# Patient Record
Sex: MALE | Race: BLACK OR AFRICAN AMERICAN | Employment: OTHER | URBAN - METROPOLITAN AREA
[De-identification: names, ages, dates, MRNs, and addresses within clinical notes are randomized per-mention and may not be internally consistent; named-entity substitution may affect disease eponyms.]

---

## 2017-02-02 ENCOUNTER — APPOINTMENT (OUTPATIENT)
Dept: LAB | Facility: HOSPITAL | Age: 62
End: 2017-02-02
Attending: INTERNAL MEDICINE
Payer: COMMERCIAL

## 2017-02-02 DIAGNOSIS — E11.42 CONTROLLED TYPE 2 DIABETES MELLITUS WITH DIABETIC POLYNEUROPATHY, WITHOUT LONG-TERM CURRENT USE OF INSULIN (HCC): ICD-10-CM

## 2017-02-02 DIAGNOSIS — Z00.00 ROUTINE HEALTH MAINTENANCE: ICD-10-CM

## 2017-02-02 LAB
ANION GAP SERPL CALC-SCNC: 9 MMOL/L (ref 0–18)
BUN SERPL-MCNC: 9 MG/DL (ref 8–20)
BUN/CREAT SERPL: 9 (ref 10–20)
CALCIUM SERPL-MCNC: 9.4 MG/DL (ref 8.5–10.5)
CHLORIDE SERPL-SCNC: 105 MMOL/L (ref 95–110)
CO2 SERPL-SCNC: 27 MMOL/L (ref 22–32)
CREAT SERPL-MCNC: 1 MG/DL (ref 0.5–1.5)
CREAT UR-MCNC: 217.3 MG/DL
GLUCOSE SERPL-MCNC: 85 MG/DL (ref 70–99)
MICROALBUMIN UR-MCNC: 3.1 MG/DL (ref 0–1.8)
MICROALBUMIN/CREAT UR: 14.3 MG/G{CREAT} (ref 0–20)
OSMOLALITY UR CALC.SUM OF ELEC: 290 MOSM/KG (ref 275–295)
POTASSIUM SERPL-SCNC: 4.3 MMOL/L (ref 3.3–5.1)
PSA SERPL-MCNC: 1.3 NG/ML (ref 0–4)
SODIUM SERPL-SCNC: 141 MMOL/L (ref 136–144)

## 2017-02-02 PROCEDURE — 36415 COLL VENOUS BLD VENIPUNCTURE: CPT

## 2017-02-02 PROCEDURE — 83036 HEMOGLOBIN GLYCOSYLATED A1C: CPT

## 2017-02-02 PROCEDURE — 80048 BASIC METABOLIC PNL TOTAL CA: CPT

## 2017-02-02 PROCEDURE — 82043 UR ALBUMIN QUANTITATIVE: CPT

## 2017-02-02 PROCEDURE — 82570 ASSAY OF URINE CREATININE: CPT

## 2017-02-03 LAB — HBA1C MFR BLD: 6.5 % (ref 4–6)

## 2017-02-16 ENCOUNTER — OFFICE VISIT (OUTPATIENT)
Dept: INTERNAL MEDICINE CLINIC | Facility: CLINIC | Age: 62
End: 2017-02-16

## 2017-02-16 VITALS
HEART RATE: 72 BPM | SYSTOLIC BLOOD PRESSURE: 138 MMHG | DIASTOLIC BLOOD PRESSURE: 78 MMHG | RESPIRATION RATE: 16 BRPM | HEIGHT: 66 IN | WEIGHT: 235 LBS | BODY MASS INDEX: 37.77 KG/M2

## 2017-02-16 DIAGNOSIS — R80.9 DIABETES MELLITUS WITH MICROALBUMINURIA (HCC): ICD-10-CM

## 2017-02-16 DIAGNOSIS — E11.29 DIABETES MELLITUS WITH MICROALBUMINURIA (HCC): ICD-10-CM

## 2017-02-16 DIAGNOSIS — E11.42 CONTROLLED TYPE 2 DIABETES MELLITUS WITH DIABETIC POLYNEUROPATHY, WITHOUT LONG-TERM CURRENT USE OF INSULIN (HCC): ICD-10-CM

## 2017-02-16 DIAGNOSIS — G57.93 NEUROPATHY OF BOTH FEET: Primary | ICD-10-CM

## 2017-02-16 DIAGNOSIS — F17.200 TOBACCO USE DISORDER: ICD-10-CM

## 2017-02-16 PROCEDURE — 99214 OFFICE O/P EST MOD 30 MIN: CPT | Performed by: INTERNAL MEDICINE

## 2017-02-16 PROCEDURE — 99212 OFFICE O/P EST SF 10 MIN: CPT | Performed by: INTERNAL MEDICINE

## 2017-02-16 RX ORDER — GABAPENTIN 300 MG/1
CAPSULE ORAL
Qty: 180 CAPSULE | Refills: 1 | Status: SHIPPED | OUTPATIENT
Start: 2017-02-16 | End: 2017-07-20

## 2017-02-16 RX ORDER — METFORMIN HYDROCHLORIDE 500 MG/1
2000 TABLET, EXTENDED RELEASE ORAL
Qty: 360 TABLET | Refills: 1 | Status: SHIPPED | OUTPATIENT
Start: 2017-02-16 | End: 2017-07-20

## 2017-02-16 NOTE — PATIENT INSTRUCTIONS
Do fasting labs in May. Fast for 12 hours. Water is okay. Walk in. Getting Support for Quitting Smoking  You don’t have to go through the process of quitting smoking without support. Tell people you are quitting.  The support of friends, coworkers, Sometimes you may just need to talk when you miss smoking. Ex-smokers are good to talk to, because they’re likely to know how you feel. You may need extra support in the first few weeks after you quit.  Ask a friend to call you each day to see how you’re do

## 2017-02-16 NOTE — PROGRESS NOTES
HPI:    Patient ID: Derrick Haywood is a 57year old male. HPI Comments: The gabapentin helps a little, but he still has numbness. It is bad at the end of the day when he relaxes. He can't quit smoking and is not motivated to do so.   He had his blood Accu-Chek Softclix Lancets Does not apply Misc Use once a day. Dx 250.00 Disp: 100 each Rfl: 3   Blood Glucose Monitoring Suppl (ACCU-CHEK DANDRE PLUS) W/DEVICE Does not apply Kit Use daily.   Dx 250.00 Disp: 1 kit Rfl: 1   Glucose Blood (ACCU-CHEK DANDRE)

## 2017-04-07 ENCOUNTER — OFFICE VISIT (OUTPATIENT)
Dept: INTERNAL MEDICINE CLINIC | Facility: CLINIC | Age: 62
End: 2017-04-07

## 2017-04-07 VITALS
HEIGHT: 66 IN | SYSTOLIC BLOOD PRESSURE: 148 MMHG | DIASTOLIC BLOOD PRESSURE: 84 MMHG | HEART RATE: 77 BPM | WEIGHT: 239.13 LBS | BODY MASS INDEX: 38.43 KG/M2 | RESPIRATION RATE: 18 BRPM

## 2017-04-07 DIAGNOSIS — M54.16 LEFT LUMBAR RADICULOPATHY: Primary | ICD-10-CM

## 2017-04-07 PROCEDURE — 99212 OFFICE O/P EST SF 10 MIN: CPT | Performed by: INTERNAL MEDICINE

## 2017-04-07 PROCEDURE — 99213 OFFICE O/P EST LOW 20 MIN: CPT | Performed by: INTERNAL MEDICINE

## 2017-04-07 RX ORDER — CYCLOBENZAPRINE HCL 10 MG
10 TABLET ORAL 3 TIMES DAILY PRN
Qty: 30 TABLET | Refills: 0 | Status: SHIPPED | OUTPATIENT
Start: 2017-04-07 | End: 2017-04-27

## 2017-04-07 RX ORDER — NABUMETONE 750 MG/1
750 TABLET, FILM COATED ORAL 2 TIMES DAILY WITH MEALS
Qty: 30 TABLET | Refills: 0 | Status: SHIPPED | OUTPATIENT
Start: 2017-04-07 | End: 2017-05-04

## 2017-04-07 NOTE — PROGRESS NOTES
See Sinclair is a 57year old male. Patient presents with:  Sciatica: Left side pain for a month worse today.     HPI:   For the past 1 month, he has had persistent pain beginning in his left buttock and radiating down the posterior aspect of his left le Blood (ACCU-CHEK DANDRE) In Vitro Strip Use once a day. Dx.250.00 Disp: 100 strip Rfl: 3   aspirin 81 MG Oral Chew Tab Chew 81 mg by mouth daily.  Disp:  Rfl:      No Known Allergies   Past Medical History   Diagnosis Date   • Appendicitis    • Knee injury 2 schedule. Return if not soon better. The patient indicates understanding of these issues and agrees to the plan.     Ginny Gonzales MD  4/7/2017  12:35 PM

## 2017-04-07 NOTE — PATIENT INSTRUCTIONS
Please rest your back, avoiding frequent and heavy lifting, and apply heat 2-3 times daily. Please take nabumetone instead of Aleve, 750 mg twice daily with meals. Please take cyclobenzaprine 10 mg 3 times daily as needed, watching for drowsiness.   Consi

## 2017-04-13 ENCOUNTER — TELEPHONE (OUTPATIENT)
Dept: FAMILY MEDICINE CLINIC | Facility: CLINIC | Age: 62
End: 2017-04-13

## 2017-04-13 DIAGNOSIS — M54.30 SCIATICA, UNSPECIFIED LATERALITY: Primary | ICD-10-CM

## 2017-04-13 RX ORDER — METHYLPREDNISOLONE 4 MG/1
TABLET ORAL
Qty: 1 KIT | Refills: 0 | Status: SHIPPED | OUTPATIENT
Start: 2017-04-13 | End: 2017-04-18 | Stop reason: ALTCHOICE

## 2017-04-13 NOTE — TELEPHONE ENCOUNTER
Actions Requested:  Sent to provider and on-call for advice.     Situation/Background   Problem:  Pain from top of buttocks to left ankle   Onset:  A month ago   Associated Symptoms:     History of Same:    Precipitated By:    Aggravating/Alleviating Factor

## 2017-04-13 NOTE — TELEPHONE ENCOUNTER
Pt is calling state that he is having real bad leg pain from buttock down to ankle   Pt state that the medication is not working pt is requesting to speak with a RN

## 2017-04-14 NOTE — TELEPHONE ENCOUNTER
Pt advised of LV message below. He verbalized understanding. Provided phone number to call for therapy.

## 2017-04-14 NOTE — TELEPHONE ENCOUNTER
I will send a steroid dose pack. It will take a day or so to start working and it might make him feel a little wound up. I will also send an order for physical therapy.    To schedule Physical Therapy at any of the [facility] facilities,

## 2017-04-15 ENCOUNTER — APPOINTMENT (OUTPATIENT)
Dept: GENERAL RADIOLOGY | Facility: HOSPITAL | Age: 62
End: 2017-04-15
Attending: EMERGENCY MEDICINE
Payer: COMMERCIAL

## 2017-04-15 ENCOUNTER — HOSPITAL ENCOUNTER (EMERGENCY)
Facility: HOSPITAL | Age: 62
Discharge: HOME | End: 2017-04-15
Attending: EMERGENCY MEDICINE
Payer: COMMERCIAL

## 2017-04-15 ENCOUNTER — APPOINTMENT (OUTPATIENT)
Dept: ULTRASOUND IMAGING | Facility: HOSPITAL | Age: 62
End: 2017-04-15
Attending: EMERGENCY MEDICINE
Payer: COMMERCIAL

## 2017-04-15 VITALS
OXYGEN SATURATION: 95 % | TEMPERATURE: 97 F | HEART RATE: 78 BPM | SYSTOLIC BLOOD PRESSURE: 132 MMHG | DIASTOLIC BLOOD PRESSURE: 72 MMHG | RESPIRATION RATE: 20 BRPM

## 2017-04-15 DIAGNOSIS — M54.16 LUMBAR RADICULOPATHY: Primary | ICD-10-CM

## 2017-04-15 PROCEDURE — 93971 EXTREMITY STUDY: CPT

## 2017-04-15 PROCEDURE — 96374 THER/PROPH/DIAG INJ IV PUSH: CPT

## 2017-04-15 PROCEDURE — 99284 EMERGENCY DEPT VISIT MOD MDM: CPT

## 2017-04-15 PROCEDURE — 96375 TX/PRO/DX INJ NEW DRUG ADDON: CPT

## 2017-04-15 PROCEDURE — 72100 X-RAY EXAM L-S SPINE 2/3 VWS: CPT

## 2017-04-15 RX ORDER — DIAZEPAM 5 MG/ML
2.5 INJECTION, SOLUTION INTRAMUSCULAR; INTRAVENOUS ONCE
Status: COMPLETED | OUTPATIENT
Start: 2017-04-15 | End: 2017-04-15

## 2017-04-15 RX ORDER — HYDROCODONE BITARTRATE AND ACETAMINOPHEN 7.5; 325 MG/1; MG/1
1 TABLET ORAL EVERY 4 HOURS PRN
Qty: 20 TABLET | Refills: 0 | Status: SHIPPED | OUTPATIENT
Start: 2017-04-15 | End: 2017-04-18

## 2017-04-15 RX ORDER — HYDROMORPHONE HYDROCHLORIDE 1 MG/ML
0.5 INJECTION, SOLUTION INTRAMUSCULAR; INTRAVENOUS; SUBCUTANEOUS ONCE
Status: COMPLETED | OUTPATIENT
Start: 2017-04-15 | End: 2017-04-15

## 2017-04-15 RX ORDER — DIAZEPAM 2 MG/1
2 TABLET ORAL 3 TIMES DAILY PRN
Qty: 20 TABLET | Refills: 0 | Status: SHIPPED | OUTPATIENT
Start: 2017-04-15 | End: 2017-04-22

## 2017-04-16 NOTE — ED PROVIDER NOTES
Patient Seen in: [facility] Emergency Department    History   Patient presents with:  Back Pain (musculoskeletal)    Stated Complaint: left leg pain     HPI    Patient presents the emergency department today complaining of left low back pain radia gabapentin 300 MG Oral Cap,  1 twice daily:  4 pm and bedtime   MetFORMIN HCl  MG Oral Tablet 24 Hr,  Take 4 tablets (2,000 mg total) by mouth daily with breakfast.   Pravastatin Sodium 10 MG Oral Tab,  Take 1 tablet (10 mg total) by mouth nightly. Device 04/15/17 1910 None (Room air)       Current:/72 mmHg  Pulse 78  Temp(Src) 97.3 °F (36.3 °C) (Temporal)  Resp 20  SpO2 95%        Physical Exam   Constitutional: He is oriented to person, place, and time.  He appears well-developed and well-nour at 10:45 PM.  States the pain is much improved. We will discharged on Norco and Valium and have continue prednisone at this time.     Disposition and Plan     Clinical Impression:  Lumbar radiculopathy  (primary encounter diagnosis)    Disposition:  Bjorn Zaman

## 2017-04-16 NOTE — ED NOTES
Pt states he has been having lower left buttock pain that shoots down the left leg for last month. Pt states pain is 9/10. Saw Dr. Babetta Alpers for issue who gave him anti inflammatories and a muscle relaxer (unknown names). Pt was on steroid pack, has 3 left.  Cesar Pinto

## 2017-04-17 ENCOUNTER — TELEPHONE (OUTPATIENT)
Dept: INTERNAL MEDICINE CLINIC | Facility: CLINIC | Age: 62
End: 2017-04-17

## 2017-04-17 ENCOUNTER — TELEPHONE (OUTPATIENT)
Dept: NEUROLOGY | Facility: CLINIC | Age: 62
End: 2017-04-17

## 2017-04-17 NOTE — TELEPHONE ENCOUNTER
Pt notified of LV message below. He will come to [facility] tomorrow at 4 - added to schedule.

## 2017-04-17 NOTE — TELEPHONE ENCOUNTER
Pt called was pt of Dr Aura Jiang that was in ER 4/15/17 with back pain. This pt was placed on steroid pack and given order for physical therapy. Was given valium and Norco for back pain and told to see Dr Mark Antonio.  Pt Given appointment for 05/04/17 4pm.

## 2017-04-17 NOTE — TELEPHONE ENCOUNTER
Patient called in wanting to schedule a ER follow up appointment with Dr. Aura Jiang.  Dr. Aura Jiang has no availability for sometime soon and patient isn't seeing Dr. Al Capone until May, but Dr. Mark Antonio requested that patient follow up with patient before hand

## 2017-04-17 NOTE — TELEPHONE ENCOUNTER
Dr Angelika Carbajal, please advise. Pt has a sciatic problem. He went to ER Saturday night. He was at work and his leg hurt so bad he had to go home. Pt had x-ray and US to rule out DVT. Pt needs f/u with LV as soon as possible.   Pt wants to know if LV can add

## 2017-04-18 ENCOUNTER — OFFICE VISIT (OUTPATIENT)
Dept: INTERNAL MEDICINE CLINIC | Facility: CLINIC | Age: 62
End: 2017-04-18

## 2017-04-18 VITALS
HEIGHT: 66 IN | HEART RATE: 80 BPM | BODY MASS INDEX: 38.2 KG/M2 | WEIGHT: 237.69 LBS | SYSTOLIC BLOOD PRESSURE: 164 MMHG | DIASTOLIC BLOOD PRESSURE: 84 MMHG | RESPIRATION RATE: 18 BRPM

## 2017-04-18 DIAGNOSIS — M54.16 LEFT LUMBAR RADICULOPATHY: Primary | ICD-10-CM

## 2017-04-18 DIAGNOSIS — R80.9 DIABETES MELLITUS WITH MICROALBUMINURIA (HCC): ICD-10-CM

## 2017-04-18 DIAGNOSIS — E11.29 DIABETES MELLITUS WITH MICROALBUMINURIA (HCC): ICD-10-CM

## 2017-04-18 DIAGNOSIS — I10 ESSENTIAL HYPERTENSION: ICD-10-CM

## 2017-04-18 PROCEDURE — 99212 OFFICE O/P EST SF 10 MIN: CPT | Performed by: INTERNAL MEDICINE

## 2017-04-18 PROCEDURE — 99214 OFFICE O/P EST MOD 30 MIN: CPT | Performed by: INTERNAL MEDICINE

## 2017-04-18 RX ORDER — LOSARTAN POTASSIUM 50 MG/1
100 TABLET ORAL DAILY
Qty: 90 TABLET | Refills: 0 | OUTPATIENT
Start: 2017-04-18 | End: 2017-05-18

## 2017-04-18 RX ORDER — PREDNISONE 10 MG/1
TABLET ORAL
Qty: 23 TABLET | Refills: 0 | Status: SHIPPED | OUTPATIENT
Start: 2017-04-18 | End: 2017-05-04

## 2017-04-18 RX ORDER — HYDROCODONE BITARTRATE AND ACETAMINOPHEN 7.5; 325 MG/1; MG/1
1 TABLET ORAL EVERY 4 HOURS PRN
Qty: 30 TABLET | Refills: 0 | Status: SHIPPED | OUTPATIENT
Start: 2017-04-18 | End: 2017-04-25

## 2017-04-18 NOTE — PROGRESS NOTES
HPI:    Patient ID: Graciela Angulo is a 57year old male. HPI Comments: Severe back pain for the past 2 weeks. He went to work, but he couldn't walk after 5 hours. He has had this in the past, but it was never this bad.   I gave him steroids over the and bedtime Disp: 180 capsule Rfl: 1   MetFORMIN HCl  MG Oral Tablet 24 Hr Take 4 tablets (2,000 mg total) by mouth daily with breakfast. Disp: 360 tablet Rfl: 1   Pravastatin Sodium 10 MG Oral Tab Take 1 tablet (10 mg total) by mouth nightly.  Disp: 1.5 tabs daily for 3 days, then 1 tab daily for 3 days. Dispense: 23 tablet; Refill: 0  - hydrocodone-acetaminophen 7.5-325 MG Oral Tab; Take 1 tablet by mouth every 4 (four) hours as needed. Dispense: 30 tablet;  Refill: 0  - PT external  - MRI SPINE LUM

## 2017-04-24 ENCOUNTER — HOSPITAL ENCOUNTER (OUTPATIENT)
Dept: MRI IMAGING | Facility: HOSPITAL | Age: 62
Discharge: HOME | End: 2017-04-24
Attending: INTERNAL MEDICINE
Payer: COMMERCIAL

## 2017-04-24 ENCOUNTER — TELEPHONE (OUTPATIENT)
Dept: INTERNAL MEDICINE CLINIC | Facility: CLINIC | Age: 62
End: 2017-04-24

## 2017-04-24 DIAGNOSIS — M54.16 LEFT LUMBAR RADICULOPATHY: ICD-10-CM

## 2017-04-24 PROCEDURE — 72148 MRI LUMBAR SPINE W/O DYE: CPT

## 2017-04-24 NOTE — TELEPHONE ENCOUNTER
Pt stop by the [address] [place] [address] office, would like [facility] paperwork to be completed, please advise- thank you

## 2017-05-04 ENCOUNTER — OFFICE VISIT (OUTPATIENT)
Dept: NEUROLOGY | Facility: CLINIC | Age: 62
End: 2017-05-04

## 2017-05-04 VITALS
DIASTOLIC BLOOD PRESSURE: 78 MMHG | SYSTOLIC BLOOD PRESSURE: 144 MMHG | HEART RATE: 89 BPM | BODY MASS INDEX: 38.09 KG/M2 | RESPIRATION RATE: 19 BRPM | WEIGHT: 237 LBS | HEIGHT: 66 IN | OXYGEN SATURATION: 97 %

## 2017-05-04 DIAGNOSIS — M51.9 LUMBAR DISC DISEASE: ICD-10-CM

## 2017-05-04 DIAGNOSIS — M48.061 LUMBAR FORAMINAL STENOSIS: ICD-10-CM

## 2017-05-04 DIAGNOSIS — M54.16 LEFT LUMBAR RADICULOPATHY: Primary | ICD-10-CM

## 2017-05-04 DIAGNOSIS — M43.16 SPONDYLOLISTHESIS, LUMBAR REGION: ICD-10-CM

## 2017-05-04 DIAGNOSIS — M43.10 RETROLISTHESIS: ICD-10-CM

## 2017-05-04 PROCEDURE — 99244 OFF/OP CNSLTJ NEW/EST MOD 40: CPT | Performed by: PHYSICAL MEDICINE & REHABILITATION

## 2017-05-04 RX ORDER — HYDROCODONE BITARTRATE AND ACETAMINOPHEN 7.5; 325 MG/1; MG/1
TABLET ORAL NIGHTLY PRN
COMMUNITY
Start: 2017-04-19 | End: 2017-07-20

## 2017-05-04 NOTE — PROGRESS NOTES
HPI:   Rufino Montgomery is a 57year old male. Patient presents with:  Low Back Pain: new right handed patient here with lower back pain radiating down the left leg with numbness and tingling in the left lower extremity worsening in the last month.  pt de posterior lower leg, left arch of the foot, left top of the foot, left sole of the foot and left first dorsal web space. · There is not weakness in both legs.      Aleve on rising then by end of day Hyrocodone 7.5/325 takes 1-2 per day and states Dr Tyree Bennett COLONOSCOPY  2003    APPENDECTOMY  2012    KNEE SURGERY         Social History   Marital Status:   Spouse Name: N/A    Years of Education: N/A  Number of Children: N/A     Occupational History  None on file     Social History Main Topics   Smoking s nightly. Disp: 90 tablet Rfl: 1   omeprazole 20 MG Oral Capsule Delayed Release Take 1 capsule (20 mg total) by mouth every morning.  Disp: 90 capsule Rfl: 1       Allergies:   No Known Allergies    Physical Exam:   /78 mmHg  Pulse 89  Resp 19  Ht 66\ extremities except:  Left Achilles tendon which are absent     Hip: Hips are stable.     RIGHT hip ROM normal   LEFT hip ROM mildly limited   Right hip flexion Negative pain   LEFT hip flexion Negative pain   RIGHT hip GEORGIE test Negative for pain   LEFT hi sooner if needed. 7. He might need lumbar flexion and extension x-rays in the future depending on how he does with the above. Discussed plan with Yves Mccormick and understands the plan, patient has no further questions.      Lilia Cole, JESSICA-BC  5/4/2017

## 2017-05-04 NOTE — PATIENT INSTRUCTIONS
As of October 6th 2014, the Drug Enforcement Agency [facility]) is reclassifying all hydrocodone combination medications from Schedule III to Schedule II. This includes medications such as Norco, Vicodin, Lortab, Zohydro, and Vicoprofen.     What this means for y 1. Reviewed both lumbar xray and lumbar MRI with pt today  2. PT ordered  Pt wishing to go to [facility] referral generated  3. Discussed options and pt wishes to proceed with L5 TFESI will wait for insurance approval  4. Pt does not need pain mediation at this

## 2017-05-05 ENCOUNTER — TELEPHONE (OUTPATIENT)
Dept: NEUROLOGY | Facility: CLINIC | Age: 62
End: 2017-05-05

## 2017-05-05 NOTE — TELEPHONE ENCOUNTER
[address] [phone] for Authorization of Approval for injection procedure Lt (L5-S1) CPT Code 13257 / 34466 / 36260 Fluoroscopy t/t Mini Abbasi , stated No Auth needed with Ref #[id number], will inform nursing to call and schedule

## 2017-05-05 NOTE — TELEPHONE ENCOUNTER
Patient has been scheduled for a left L5 TFESI  on 05/16/17 at the [facility]. Medications and allergies reviewed. Patient informed to hold aspirins, nsaids, blood thinners, vitamins and fish oils 5-7 days prior to procedure.  Patient informed we will need verbal

## 2017-05-08 ENCOUNTER — TELEPHONE (OUTPATIENT)
Dept: NEUROLOGY | Facility: CLINIC | Age: 62
End: 2017-05-08

## 2017-05-08 PROBLEM — M43.16 SPONDYLOLISTHESIS, LUMBAR REGION: Status: ACTIVE | Noted: 2017-05-08

## 2017-05-08 PROBLEM — M43.10 RETROLISTHESIS: Status: ACTIVE | Noted: 2017-05-08

## 2017-05-08 PROBLEM — M48.061 LUMBAR FORAMINAL STENOSIS: Status: ACTIVE | Noted: 2017-05-08

## 2017-05-08 PROBLEM — M51.9 LUMBAR DISC DISEASE: Status: ACTIVE | Noted: 2017-05-08

## 2017-05-08 NOTE — TELEPHONE ENCOUNTER
Called patient to advise insurance was verified and PT is a covered benefit and does not require authorization for initial evaluation, patient stated that he was going to call [facility], and thanked me for my call

## 2017-05-16 ENCOUNTER — OFFICE VISIT (OUTPATIENT)
Dept: SURGERY | Facility: CLINIC | Age: 62
End: 2017-05-16

## 2017-05-16 DIAGNOSIS — M51.9 LUMBAR DISC DISEASE: ICD-10-CM

## 2017-05-16 DIAGNOSIS — M43.10 RETROLISTHESIS: ICD-10-CM

## 2017-05-16 DIAGNOSIS — M54.16 LEFT LUMBAR RADICULOPATHY: Primary | ICD-10-CM

## 2017-05-16 PROCEDURE — 64483 NJX AA&/STRD TFRM EPI L/S 1: CPT | Performed by: PHYSICAL MEDICINE & REHABILITATION

## 2017-05-16 NOTE — PROCEDURES
Stanislaw Burger UDiane [address].    LUMBAR TRANSFORAMINAL   NAME:  Frieda Sheppard    MR #:    [id number] :  1955     PHYSICIAN:  Kassi Irwin        Operative Report    DATE OF PROCEDURE: 2017   PREOPERATIVE DIAGNOSES: 1.  Left L5-S1 radic indicating correct needle placement. Then, aspiration was performed. No blood, fluid, or air was aspirated. Then, the patient was injected with a 5 cc solution of 2 cc of 40 mg/cc of Kenalog and 3 cc of 1% PF lidocaine without epinephrine.   After this,

## 2017-05-17 ENCOUNTER — TELEPHONE (OUTPATIENT)
Dept: NEUROLOGY | Facility: CLINIC | Age: 62
End: 2017-05-17

## 2017-05-17 NOTE — TELEPHONE ENCOUNTER
Patient states he is experiencing some mild tingling when he goes from sitting to standing positions in the left lower extremity. Symptoms only last a few seconds but he wanted to notify Dr. Mark Antonio.  Patient will call back in 2 weeks with condition update po

## 2017-05-17 NOTE — TELEPHONE ENCOUNTER
Amrita Chacon had an injection yesterday, 5-16-17, and was told to call with any issues. He is still having a shock/zinging sensations from his ankle to his toes.  Please call to discuss

## 2017-05-18 ENCOUNTER — OFFICE VISIT (OUTPATIENT)
Dept: INTERNAL MEDICINE CLINIC | Facility: CLINIC | Age: 62
End: 2017-05-18

## 2017-05-18 VITALS
SYSTOLIC BLOOD PRESSURE: 151 MMHG | HEART RATE: 73 BPM | HEIGHT: 66 IN | BODY MASS INDEX: 38.11 KG/M2 | RESPIRATION RATE: 18 BRPM | DIASTOLIC BLOOD PRESSURE: 78 MMHG | WEIGHT: 237.13 LBS

## 2017-05-18 DIAGNOSIS — I10 ESSENTIAL HYPERTENSION: Primary | ICD-10-CM

## 2017-05-18 DIAGNOSIS — E11.42 CONTROLLED TYPE 2 DIABETES MELLITUS WITH DIABETIC POLYNEUROPATHY, WITHOUT LONG-TERM CURRENT USE OF INSULIN (HCC): ICD-10-CM

## 2017-05-18 DIAGNOSIS — M54.16 LEFT LUMBAR RADICULOPATHY: ICD-10-CM

## 2017-05-18 PROCEDURE — 99212 OFFICE O/P EST SF 10 MIN: CPT | Performed by: INTERNAL MEDICINE

## 2017-05-18 PROCEDURE — 99214 OFFICE O/P EST MOD 30 MIN: CPT | Performed by: INTERNAL MEDICINE

## 2017-05-18 RX ORDER — AMLODIPINE BESYLATE 5 MG/1
5 TABLET ORAL DAILY
Qty: 30 TABLET | Refills: 5 | Status: SHIPPED | OUTPATIENT
Start: 2017-05-18 | End: 2017-07-20

## 2017-05-18 RX ORDER — LOSARTAN POTASSIUM 100 MG/1
100 TABLET ORAL DAILY
Qty: 90 TABLET | Refills: 0 | Status: SHIPPED | OUTPATIENT
Start: 2017-05-18 | End: 2017-07-20

## 2017-05-18 NOTE — PROGRESS NOTES
HPI:    Patient ID: Graciela Angulo is a 57year old male. HPI Comments: He is much better!!.  He still has a little tingling in his feet, but otherwise much better. HTN  This is a chronic problem. The current episode started more than 1 year ago.  Rhonda Padron respiratory distress. Musculoskeletal:        Lumbar back: He exhibits no tenderness. ASSESSMENT/PLAN:   1. Essential hypertension  It has been higher lately. Will add Norvasc. F/U in 2 months. - Losartan Potassium 100 MG Oral Tab;  Take 1

## 2017-05-25 NOTE — TELEPHONE ENCOUNTER
Recalled pt. Pt stated that he will need note to return to work. Pt has been off of work since 04/16/17. Pt stated that he has 75% resolution of symptoms, less pain but still has tighness in back. Pt has three more physical therapy visits and is doing HEP.

## 2017-06-09 ENCOUNTER — MED REC SCAN ONLY (OUTPATIENT)
Dept: NEUROLOGY | Facility: CLINIC | Age: 62
End: 2017-06-09

## 2017-06-11 DIAGNOSIS — G57.93 NEUROPATHY OF BOTH FEET: Primary | ICD-10-CM

## 2017-06-14 RX ORDER — PRAVASTATIN SODIUM 10 MG
TABLET ORAL
Qty: 90 TABLET | Refills: 0 | Status: SHIPPED | OUTPATIENT
Start: 2017-06-14 | End: 2017-07-20

## 2017-06-15 NOTE — TELEPHONE ENCOUNTER
Chart reviewed. Refills sent per Triage Dept protocol. Needs labs at next appt.   Cholesterol Medications  Protocol Criteria:  · Appointment scheduled in the past 12 months or in the next 3 months  · ALT & LDL on file in the past 12 months  · ALT result <

## 2017-07-14 ENCOUNTER — PATIENT MESSAGE (OUTPATIENT)
Dept: INTERNAL MEDICINE CLINIC | Facility: CLINIC | Age: 62
End: 2017-07-14

## 2017-07-14 DIAGNOSIS — K21.9 GASTROESOPHAGEAL REFLUX DISEASE, ESOPHAGITIS PRESENCE NOT SPECIFIED: ICD-10-CM

## 2017-07-17 RX ORDER — OMEPRAZOLE 20 MG/1
20 CAPSULE, DELAYED RELEASE ORAL EVERY MORNING
Qty: 90 CAPSULE | Refills: 0 | Status: SHIPPED | OUTPATIENT
Start: 2017-07-17 | End: 2017-11-15

## 2017-07-17 NOTE — TELEPHONE ENCOUNTER
From: Mitchel Maya  To: Andrea Balderas MD  Sent: 7/14/2017 3:03 PM CDT  Subject: Visit Follow-up Question    can't remenber if my blood work is fasting or non before my next visit . please advise.  plus [facility] and said they have requested a refill f

## 2017-07-17 NOTE — TELEPHONE ENCOUNTER
Omeprazole refilled per protocol. Email sent to patient.     Refill Protocol Appointment Criteria  · Appointment scheduled in the past 12 months or in the next 3 months  Recent Outpatient Visits            2 months ago Essential hypertension    Melissa Bess

## 2017-07-19 ENCOUNTER — TELEPHONE (OUTPATIENT)
Dept: INTERNAL MEDICINE CLINIC | Facility: CLINIC | Age: 62
End: 2017-07-19

## 2017-07-19 NOTE — TELEPHONE ENCOUNTER
Actions Requested: scheduled appt 7/20/17 in [facility] as 1st avaialble  Problem: back pain  Onset and Timing: < 24 hours 7/18/17  Associated Symptoms: while at worked lifted heavy object and pain shot up back.   Pain at lower back 6/10   Aggravating by: movement Advice Discussed:  * Reassurance  * Cold or Heat  * Sleep  * Activity  * Reasons To Call Back      - Numbness or weakness occur      - Bowel/bladder problems occur      - Pain lasts for more than 2 weeks      - You become worse  * Prevention  * Good Body M

## 2017-07-20 ENCOUNTER — OFFICE VISIT (OUTPATIENT)
Dept: INTERNAL MEDICINE CLINIC | Facility: CLINIC | Age: 62
End: 2017-07-20

## 2017-07-20 ENCOUNTER — LAB ENCOUNTER (OUTPATIENT)
Dept: LAB | Facility: HOSPITAL | Age: 62
End: 2017-07-20
Attending: INTERNAL MEDICINE
Payer: COMMERCIAL

## 2017-07-20 VITALS
HEIGHT: 66 IN | SYSTOLIC BLOOD PRESSURE: 143 MMHG | RESPIRATION RATE: 18 BRPM | WEIGHT: 241.13 LBS | BODY MASS INDEX: 38.75 KG/M2 | DIASTOLIC BLOOD PRESSURE: 82 MMHG | HEART RATE: 78 BPM

## 2017-07-20 DIAGNOSIS — E11.42 CONTROLLED TYPE 2 DIABETES MELLITUS WITH DIABETIC POLYNEUROPATHY, WITHOUT LONG-TERM CURRENT USE OF INSULIN (HCC): ICD-10-CM

## 2017-07-20 DIAGNOSIS — M54.31 SCIATICA OF RIGHT SIDE: Primary | ICD-10-CM

## 2017-07-20 DIAGNOSIS — I10 ESSENTIAL HYPERTENSION: ICD-10-CM

## 2017-07-20 DIAGNOSIS — G57.93 NEUROPATHY OF BOTH FEET: ICD-10-CM

## 2017-07-20 DIAGNOSIS — E78.00 HYPERCHOLESTEROLEMIA: ICD-10-CM

## 2017-07-20 LAB
CHOLEST SERPL-MCNC: 144 MG/DL (ref 110–200)
HBA1C MFR BLD: 7 % (ref 4–6)
HDLC SERPL-MCNC: 42 MG/DL
LDLC SERPL CALC-MCNC: 89 MG/DL (ref 0–99)
NONHDLC SERPL-MCNC: 102 MG/DL
TRIGL SERPL-MCNC: 63 MG/DL (ref 1–149)

## 2017-07-20 PROCEDURE — 80061 LIPID PANEL: CPT | Performed by: INTERNAL MEDICINE

## 2017-07-20 PROCEDURE — 36415 COLL VENOUS BLD VENIPUNCTURE: CPT

## 2017-07-20 PROCEDURE — 99214 OFFICE O/P EST MOD 30 MIN: CPT | Performed by: INTERNAL MEDICINE

## 2017-07-20 PROCEDURE — 83036 HEMOGLOBIN GLYCOSYLATED A1C: CPT

## 2017-07-20 PROCEDURE — 99212 OFFICE O/P EST SF 10 MIN: CPT | Performed by: INTERNAL MEDICINE

## 2017-07-20 RX ORDER — AMLODIPINE BESYLATE 5 MG/1
5 TABLET ORAL DAILY
Qty: 90 TABLET | Refills: 1 | Status: SHIPPED | OUTPATIENT
Start: 2017-07-20 | End: 2017-08-03

## 2017-07-20 RX ORDER — HYDROCODONE BITARTRATE AND ACETAMINOPHEN 7.5; 325 MG/1; MG/1
1 TABLET ORAL 2 TIMES DAILY PRN
Qty: 40 TABLET | Refills: 0 | Status: SHIPPED | OUTPATIENT
Start: 2017-07-20 | End: 2018-01-11 | Stop reason: ALTCHOICE

## 2017-07-20 RX ORDER — LOSARTAN POTASSIUM 100 MG/1
100 TABLET ORAL DAILY
Qty: 90 TABLET | Refills: 0 | Status: CANCELLED | OUTPATIENT
Start: 2017-07-20 | End: 2018-07-20

## 2017-07-20 RX ORDER — METFORMIN HYDROCHLORIDE 500 MG/1
2000 TABLET, EXTENDED RELEASE ORAL
Qty: 360 TABLET | Refills: 1 | Status: SHIPPED | OUTPATIENT
Start: 2017-07-20 | End: 2018-02-22

## 2017-07-20 RX ORDER — LOSARTAN POTASSIUM 100 MG/1
100 TABLET ORAL DAILY
Qty: 180 TABLET | Refills: 1 | Status: SHIPPED | OUTPATIENT
Start: 2017-07-20 | End: 2018-02-22

## 2017-07-20 RX ORDER — GABAPENTIN 300 MG/1
300 CAPSULE ORAL 3 TIMES DAILY PRN
Qty: 270 CAPSULE | Refills: 1 | Status: SHIPPED | OUTPATIENT
Start: 2017-07-20 | End: 2018-01-26

## 2017-07-20 RX ORDER — PRAVASTATIN SODIUM 10 MG
TABLET ORAL
Qty: 90 TABLET | Refills: 1 | Status: SHIPPED | OUTPATIENT
Start: 2017-07-20 | End: 2018-02-22

## 2017-07-20 NOTE — PROGRESS NOTES
HPI:    Patient ID: Radha Mcneill is a 57year old male. He hurt his back on 7/18 at work while lifting something. He works at a kitchen at [employer].  There was sudden onset pain in the low back radiating down the the right knee.   He ha Cap 1 twice daily:  4 pm and bedtime (Patient taking differently: Take 300 mg by mouth 2 (two) times daily.  ) Disp: 180 capsule Rfl: 1   MetFORMIN HCl  MG Oral Tablet 24 Hr Take 4 tablets (2,000 mg total) by mouth daily with breakfast. Disp: 360 tab present management.  - gabapentin 300 MG Oral Cap; Take 1 capsule (300 mg total) by mouth 3 (three) times daily as needed. Dispense: 270 capsule; Refill: 1    5. Hypercholesterolemia  Stable. Continue present management.   - Pravastatin Sodium 10 MG Oral

## 2017-08-03 ENCOUNTER — OFFICE VISIT (OUTPATIENT)
Dept: INTERNAL MEDICINE CLINIC | Facility: CLINIC | Age: 62
End: 2017-08-03

## 2017-08-03 VITALS
HEART RATE: 83 BPM | RESPIRATION RATE: 18 BRPM | HEIGHT: 66 IN | BODY MASS INDEX: 39.19 KG/M2 | DIASTOLIC BLOOD PRESSURE: 82 MMHG | SYSTOLIC BLOOD PRESSURE: 149 MMHG | WEIGHT: 243.88 LBS

## 2017-08-03 DIAGNOSIS — E11.42 CONTROLLED TYPE 2 DIABETES MELLITUS WITH DIABETIC POLYNEUROPATHY, WITHOUT LONG-TERM CURRENT USE OF INSULIN (HCC): ICD-10-CM

## 2017-08-03 DIAGNOSIS — M54.31 SCIATICA OF RIGHT SIDE: Primary | ICD-10-CM

## 2017-08-03 DIAGNOSIS — I10 ESSENTIAL HYPERTENSION: ICD-10-CM

## 2017-08-03 PROCEDURE — 99214 OFFICE O/P EST MOD 30 MIN: CPT | Performed by: INTERNAL MEDICINE

## 2017-08-03 PROCEDURE — 99212 OFFICE O/P EST SF 10 MIN: CPT | Performed by: INTERNAL MEDICINE

## 2017-08-03 RX ORDER — AMLODIPINE BESYLATE 10 MG/1
10 TABLET ORAL DAILY
Qty: 30 TABLET | Refills: 0 | OUTPATIENT
Start: 2017-08-03 | End: 2018-02-22

## 2017-08-03 NOTE — PROGRESS NOTES
HPI:    Patient ID: Rehana Negrete is a 60year old male. The back pain has improved, but is still so bad that he can only drive for a few miles. He has burning across the back. He is not sure if the gabapentin is helping.   He is going to PT which is Known Allergies   PHYSICAL EXAM:   Physical Exam   Nursing note and vitals reviewed. Constitutional: He is oriented to person, place, and time. He appears well-developed and well-nourished. HENT:   Head: Normocephalic and atraumatic.    Eyes: EOM are no

## 2017-08-16 ENCOUNTER — MED REC SCAN ONLY (OUTPATIENT)
Dept: INTERNAL MEDICINE CLINIC | Facility: CLINIC | Age: 62
End: 2017-08-16

## 2017-09-05 ENCOUNTER — OFFICE VISIT (OUTPATIENT)
Dept: NEUROLOGY | Facility: CLINIC | Age: 62
End: 2017-09-05

## 2017-09-05 VITALS
DIASTOLIC BLOOD PRESSURE: 88 MMHG | RESPIRATION RATE: 18 BRPM | SYSTOLIC BLOOD PRESSURE: 144 MMHG | HEART RATE: 79 BPM | BODY MASS INDEX: 38.09 KG/M2 | WEIGHT: 237 LBS | OXYGEN SATURATION: 95 % | HEIGHT: 66 IN

## 2017-09-05 DIAGNOSIS — M51.9 LUMBAR DISC DISEASE: Primary | ICD-10-CM

## 2017-09-05 DIAGNOSIS — M54.16 LEFT LUMBAR RADICULOPATHY: ICD-10-CM

## 2017-09-05 DIAGNOSIS — M48.061 LUMBAR FORAMINAL STENOSIS: ICD-10-CM

## 2017-09-05 DIAGNOSIS — E11.9 CONTROLLED TYPE 2 DIABETES MELLITUS WITHOUT COMPLICATION, WITHOUT LONG-TERM CURRENT USE OF INSULIN (HCC): ICD-10-CM

## 2017-09-05 DIAGNOSIS — M43.16 SPONDYLOLISTHESIS, LUMBAR REGION: ICD-10-CM

## 2017-09-05 DIAGNOSIS — M43.10 RETROLISTHESIS: ICD-10-CM

## 2017-09-05 PROCEDURE — 99214 OFFICE O/P EST MOD 30 MIN: CPT | Performed by: PHYSICAL MEDICINE & REHABILITATION

## 2017-09-05 NOTE — PATIENT INSTRUCTIONS
Plan  I will perform bilateral L5 TFESI(s). He will continue with his light duty for now lifting no more than 10 lbs with rare bending and twisting.     The patient will continue with his home exercise program.    The patient does not need any pain med

## 2017-09-05 NOTE — PROGRESS NOTES
Low Back Pain H & P    Chief Complaint: Patient presents with:  Back Pain: Left L5 TFESI 5/16/17 with 75% relief lasting for 2 months. Pt then lifted shelving at work causing recurrent pain in back. Pain is upper and lower back.  Pt has mild pinching sensat myopia 6/30/2015   • Knee injury 2012   • Obesity, unspecified    • Other and unspecified hyperlipidemia    • Type II or unspecified type diabetes mellitus without mention of complication, not stated as uncontrolled    • Unspecified essential hypertension Extraocular muscles are intact. There is no kern icterus. Pupils are equal, round, and reactive to light. No redness or discharge bilaterally. Skin:  There are no rashes or lesions.     Vitals:   09/05/17  1536   BP: 144/88   Pulse: 79   Resp: 18       G his home exercise program.    The patient does not need any pain medications at this time. The patient will follow up in 3 months, but the patient will call me 2 weeks after having the injection to let me know how the injection worked.     The patient un

## 2017-09-06 ENCOUNTER — TELEPHONE (OUTPATIENT)
Dept: NEUROLOGY | Facility: CLINIC | Age: 62
End: 2017-09-06

## 2017-09-07 NOTE — TELEPHONE ENCOUNTER
Ifrah on [email or web address] cary requesting  authorization of approval of bilateral L5 TFESIs cpt codes [id number], [id number], will fax clinical notes.

## 2017-09-07 NOTE — TELEPHONE ENCOUNTER
Faxed clinical notes to [email or web address] for authorization of bilateral L5 TFESIs pending approval.

## 2017-09-12 ENCOUNTER — MED REC SCAN ONLY (OUTPATIENT)
Dept: INTERNAL MEDICINE CLINIC | Facility: CLINIC | Age: 62
End: 2017-09-12

## 2017-09-19 NOTE — TELEPHONE ENCOUNTER
PATRICIA/m on [email or web address] [email or web address]/[email or web address] checking on status of authorization of bilateral L5 TFESIs pending approval.

## 2017-09-25 NOTE — TELEPHONE ENCOUNTER
L/m on [facility] v/m checking on status of approval of bilateral L5 TFESIs. Await return call and or  fax. Heydi Harris

## 2017-10-06 NOTE — TELEPHONE ENCOUNTER
Patient is in a lot of pain. He has left messages with Jovany Abbott and no one is returning his calls. We haven't had any response from Mali Arriaga either. He has [facility] and wants to know if he can go through his employer.

## 2017-10-09 NOTE — TELEPHONE ENCOUNTER
Received fax from [employer] requesting clinical notes, will fax.  Faxed clinical notes to Vivian Suresh for authorization pending approval.

## 2017-10-18 NOTE — TELEPHONE ENCOUNTER
L/m on  [email or web address] [email or web address] checking on statis of approval for bilateral L5 TFESIs. Await return call or fax.

## 2017-11-09 NOTE — TELEPHONE ENCOUNTER
T/t Greg Cardona checking on status of approval for bilateral L5 TFESIs. States he is still waiting on records from [facility] and another Dr. so he can determine if procedure will be approved.  Per his request, will call him on 11/16/17 to leighann

## 2017-11-15 DIAGNOSIS — K21.9 GASTROESOPHAGEAL REFLUX DISEASE, ESOPHAGITIS PRESENCE NOT SPECIFIED: ICD-10-CM

## 2017-11-15 RX ORDER — OMEPRAZOLE 20 MG/1
20 CAPSULE, DELAYED RELEASE ORAL EVERY MORNING
Qty: 90 CAPSULE | Refills: 1 | Status: SHIPPED | OUTPATIENT
Start: 2017-11-15 | End: 2018-02-22

## 2017-11-15 NOTE — TELEPHONE ENCOUNTER
Pharmacy calling to request refill for omeprazole 20 MG Oral Capsule Delayed Release. Current Outpatient Prescriptions:  omeprazole 20 MG Oral Capsule Delayed Release Take 1 capsule (20 mg total) by mouth every morning.  Disp: 90 capsule Rfl: 0

## 2017-12-14 NOTE — TELEPHONE ENCOUNTER
L/m on [email or web address] W/c checking on status of approval for bilateral L5 TFESIs. Await response. Oneal Claude

## 2017-12-20 ENCOUNTER — HOSPITAL ENCOUNTER (EMERGENCY)
Facility: HOSPITAL | Age: 62
Discharge: HOME | End: 2017-12-20
Attending: EMERGENCY MEDICINE
Payer: COMMERCIAL

## 2017-12-20 ENCOUNTER — NURSE TRIAGE (OUTPATIENT)
Dept: OTHER | Age: 62
End: 2017-12-20

## 2017-12-20 ENCOUNTER — OFFICE VISIT (OUTPATIENT)
Dept: OPHTHALMOLOGY | Facility: CLINIC | Age: 62
End: 2017-12-20

## 2017-12-20 VITALS
TEMPERATURE: 98 F | RESPIRATION RATE: 18 BRPM | BODY MASS INDEX: 38.77 KG/M2 | OXYGEN SATURATION: 98 % | SYSTOLIC BLOOD PRESSURE: 161 MMHG | HEART RATE: 92 BPM | HEIGHT: 67 IN | WEIGHT: 247 LBS | DIASTOLIC BLOOD PRESSURE: 77 MMHG

## 2017-12-20 DIAGNOSIS — H57.11 OCULAR PAIN, RIGHT EYE: Primary | ICD-10-CM

## 2017-12-20 DIAGNOSIS — H20.00 ACUTE IRITIS OF RIGHT EYE: Primary | ICD-10-CM

## 2017-12-20 PROCEDURE — 99212 OFFICE O/P EST SF 10 MIN: CPT | Performed by: OPHTHALMOLOGY

## 2017-12-20 PROCEDURE — 99283 EMERGENCY DEPT VISIT LOW MDM: CPT

## 2017-12-20 PROCEDURE — 99214 OFFICE O/P EST MOD 30 MIN: CPT | Performed by: OPHTHALMOLOGY

## 2017-12-20 RX ORDER — TETRACAINE HYDROCHLORIDE 5 MG/ML
1-2 SOLUTION OPHTHALMIC ONCE
Status: COMPLETED | OUTPATIENT
Start: 2017-12-20 | End: 2017-12-20

## 2017-12-20 RX ORDER — PURIFIED WATER 986 MG/ML
SOLUTION OPHTHALMIC
Status: COMPLETED
Start: 2017-12-20 | End: 2017-12-20

## 2017-12-20 RX ORDER — TETRACAINE HYDROCHLORIDE 5 MG/ML
SOLUTION OPHTHALMIC
Status: COMPLETED
Start: 2017-12-20 | End: 2017-12-20

## 2017-12-20 RX ORDER — PURIFIED WATER 986 MG/ML
2 SOLUTION OPHTHALMIC AS NEEDED
Status: DISCONTINUED | OUTPATIENT
Start: 2017-12-20 | End: 2017-12-20

## 2017-12-20 RX ORDER — PREDNISOLONE ACETATE 10 MG/ML
SUSPENSION/ DROPS OPHTHALMIC
Qty: 1 BOTTLE | Refills: 1 | Status: SHIPPED | OUTPATIENT
Start: 2017-12-20 | End: 2017-12-26

## 2017-12-20 NOTE — ED NOTES
Care assumed from triage presents from home with 3 day hx nontraumatic R eye pain with associated conjunctiva induration denies drainage Visual acuity completed

## 2017-12-20 NOTE — ED PROVIDER NOTES
Patient Seen in: [facility] Emergency Department    History   Patient presents with:   Eye Visual Problem (opthalmic)    Stated Complaint: Right eye redness/sore    HPI    Patient is a 76-year-old male that complains of redness to his right eye for (36.8 °C) (Oral)   Resp 18   Ht 170.2 cm (5' 7\")   Wt 112 kg   SpO2 98%   BMI 38.69 kg/m²     Right Eye Chart Acuity: 20/50, Corrected  Left Eye Chart Acuity: 20/40, Corrected    Physical Exam    Gen: pt is alert, no obvious distress  Eyelids: nl inspecti

## 2017-12-20 NOTE — TELEPHONE ENCOUNTER
Action Requested: Summary for Provider     []  Critical Lab, Recommendations Needed  [] Need Additional Advice  [x]   FYI    []   Need Orders  [] Need Medications Sent to Pharmacy  []  Other     SUMMARY: Advised ER per protocol as pt states eye pain is s

## 2017-12-20 NOTE — ED NOTES
Discharged to follow up immediately with optho at scheduled appointment  Alert and interactive. Hemodynamically stable.  Ambulates to exit with steady gait

## 2017-12-20 NOTE — ASSESSMENT & PLAN NOTE
Discussed diagnosis and treatment in detail with patient. Information on iritis given to patient. 5% Homatropine and 2.5% Roberto instilled in right eye in office at time of appointment.    Start Pred forte in the right eye;   Use 1 drop in the right eye onc

## 2017-12-20 NOTE — TELEPHONE ENCOUNTER
ED  Current      12/20/2017 - present  [facility] AND [facility] Emergency Department   Mercy Chapman MD   Attending • Treatment team   Ocular pain, right eye   Clinical impression   Eye Visual Problem (opthalmic)   Chief complaint

## 2017-12-20 NOTE — TELEPHONE ENCOUNTER
Per LV conversation, ER is sending patient to Dr. Sami Altamirano. The ER can handle pink eye and allergies, but do not have the equipment or expertise to handle eye emergencies.

## 2017-12-20 NOTE — TELEPHONE ENCOUNTER
Please see if you can get him in with one of our ophthalmologists. If not Dr. Jason Romero, then perhaps Dr. Tracie Acosta or Dr. Darius Forbes. If not them, then the [facility] eye clinic. The ER is not a very good place for this problem.

## 2017-12-20 NOTE — PATIENT INSTRUCTIONS
Acute iritis of right eye  Discussed diagnosis and treatment in detail with patient. Information on iritis given to patient. 5% Homatropine and 2.5% Roberto instilled in right eye in office at time of appointment.    Start Pred forte in the right eye;   Use healthcare provider right away if any of these occur:  · Symptoms don’t start to improve after 1 week  · Iritis from eye injury causes symptoms for more than 2 weeks  · Pain increases  · Your eye becomes red  · You lose some or all of your vision  Date Las edema)  · Calcium deposits on the cornea (band keratopathy)  · Optic nerve damage  If severe, problems like these can cause partial or total loss of eyesight. Your eye doctor will try to prevent these problems by treating your iritis right away.  You may ne front of your eye. In the center of the iris is the pupil. It opens and closes to control how much light enters your eye. The middle part of the eye is called the uvea. It includes the iris. With iritis, the iris and anterior chamber are inflamed.  [facility], [address]. All rights reserved. This information is not intended as a substitute for professional medical care. Always follow your healthcare professional's instructions.

## 2017-12-20 NOTE — PROGRESS NOTES
Marcela Coma is a 60year old male. HPI:     HPI     Patient is complaining  of pain (pain is a 5/10) and redness to his right eye for the last 4 days. He states his vision this morning seems cloudy and blurry.   There is no discharge or drainage or rarely      Medications:    Current Outpatient Prescriptions:  prednisoLONE acetate (PRED FORTE) 1 % Ophthalmic Suspension Use 1 drop every hour while awake in the right eye for 2 days, then decrease to 1 drop every 2 hours while awake until next visit.  Curry Francis pigment on ant capsule Deep and quiet- + pigment on ant capsule    Iris fibrin along pupil margin superiorly  Normal            Refraction     Wearing Rx       Sphere Cylinder Axis Add    Right -9.25 +1.50 170 +2.25    Left -12.00 +1.50 030 +2.25    Type:

## 2017-12-21 NOTE — TELEPHONE ENCOUNTER
Patient returning call, states he feels much better today. Pt diagnosed with acute iritis of right eye by Dr Robyn Mckinney. Advised pt if symptoms do not improve or if they worsen, to contact Dr Pippa Guerrier office directly.   Patient verbalized understanding a

## 2017-12-26 ENCOUNTER — OFFICE VISIT (OUTPATIENT)
Dept: OPHTHALMOLOGY | Facility: CLINIC | Age: 62
End: 2017-12-26

## 2017-12-26 DIAGNOSIS — H20.00 ACUTE IRITIS OF RIGHT EYE: Primary | ICD-10-CM

## 2017-12-26 PROCEDURE — 99212 OFFICE O/P EST SF 10 MIN: CPT | Performed by: OPHTHALMOLOGY

## 2017-12-26 PROCEDURE — 99213 OFFICE O/P EST LOW 20 MIN: CPT | Performed by: OPHTHALMOLOGY

## 2017-12-26 RX ORDER — PREDNISOLONE ACETATE 10 MG/ML
SUSPENSION/ DROPS OPHTHALMIC
Qty: 1 BOTTLE | Refills: 1 | Status: SHIPPED | OUTPATIENT
Start: 2017-12-26 | End: 2018-02-07

## 2017-12-26 NOTE — PROGRESS NOTES
Cherelle Bradford is a 60year old male. HPI:     HPI     Patient is here for a 1 week recheck of iritis OD. After last visit on 12/20/17, patient used PF every hour while awake for 2 days, and currently is using it every 2 hours in the right eye.   The p Outpatient Prescriptions:  prednisoLONE acetate (PRED FORTE) 1 % Ophthalmic Suspension Use 1 drop 4 times a day in the right eye for 14 days, then 3 times a day for 7 days, then 2 times a day for 7 days, then once a day for 7 days, then discontinue.  Disp: Deep and quiet- + pigment on ant capsule    Iris Normal Normal    Lens + pigment on anterior capsule              Refraction     Wearing Rx       Sphere Cylinder Axis Add    Right -9.25 +1.50 170 +2.25    Left -12.00 +1.50 030 +2.25    Type:  Progressive b

## 2017-12-26 NOTE — ASSESSMENT & PLAN NOTE
Improved. Continue Pred forte 4 times a day in the right  for 2 more weeks, then 3 times a day for 1 week, then 2 times a day for 1 week, then once a day for 1 week, then discontinue. Discussed with patient that iritis can reoccur.   Told patient to [facility]

## 2017-12-26 NOTE — PATIENT INSTRUCTIONS
Acute iritis of right eye  Improved. Continue Pred forte 4 times a day in the right  for 2 more weeks, then 3 times a day for 1 week, then 2 times a day for 1 week, then once a day for 1 week, then discontinue.    Discussed with patient that iritis can reo

## 2018-01-08 NOTE — TELEPHONE ENCOUNTER
From: Marlon Loya  Sent: 1/8/2018 8:08 AM CST  Subject: Medication Renewal Request    Marlon Loya would like a refill of the following medications:     Glucose Blood (ONETOUCH TEST) In Vitro Strip Neda Locke MD]     [facility] LANCETS Does not

## 2018-01-11 ENCOUNTER — APPOINTMENT (OUTPATIENT)
Dept: LAB | Age: 63
End: 2018-01-11
Attending: PHYSICIAN ASSISTANT
Payer: COMMERCIAL

## 2018-01-11 ENCOUNTER — OFFICE VISIT (OUTPATIENT)
Dept: INTERNAL MEDICINE CLINIC | Facility: CLINIC | Age: 63
End: 2018-01-11

## 2018-01-11 VITALS
SYSTOLIC BLOOD PRESSURE: 144 MMHG | HEART RATE: 83 BPM | DIASTOLIC BLOOD PRESSURE: 80 MMHG | HEIGHT: 66 IN | TEMPERATURE: 97 F | RESPIRATION RATE: 20 BRPM | WEIGHT: 245.88 LBS | OXYGEN SATURATION: 96 % | BODY MASS INDEX: 39.52 KG/M2

## 2018-01-11 DIAGNOSIS — R07.89 CHEST WALL DISCOMFORT: Primary | ICD-10-CM

## 2018-01-11 DIAGNOSIS — R07.89 CHEST WALL DISCOMFORT: ICD-10-CM

## 2018-01-11 LAB
ALBUMIN SERPL BCP-MCNC: 4.4 G/DL (ref 3.5–4.8)
ALBUMIN/GLOB SERPL: 1.4 {RATIO} (ref 1–2)
ALP SERPL-CCNC: 67 U/L (ref 32–100)
ALT SERPL-CCNC: 29 U/L (ref 17–63)
ANION GAP SERPL CALC-SCNC: 8 MMOL/L (ref 0–18)
AST SERPL-CCNC: 23 U/L (ref 15–41)
BASOPHILS # BLD: 0.1 K/UL (ref 0–0.2)
BASOPHILS NFR BLD: 1 %
BILIRUB SERPL-MCNC: 0.3 MG/DL (ref 0.3–1.2)
BUN SERPL-MCNC: 8 MG/DL (ref 8–20)
BUN/CREAT SERPL: 7.8 (ref 10–20)
CALCIUM SERPL-MCNC: 9.6 MG/DL (ref 8.5–10.5)
CHLORIDE SERPL-SCNC: 106 MMOL/L (ref 95–110)
CO2 SERPL-SCNC: 27 MMOL/L (ref 22–32)
CREAT SERPL-MCNC: 1.02 MG/DL (ref 0.5–1.5)
EOSINOPHIL # BLD: 0.2 K/UL (ref 0–0.7)
EOSINOPHIL NFR BLD: 2 %
ERYTHROCYTE [DISTWIDTH] IN BLOOD BY AUTOMATED COUNT: 14.3 % (ref 11–15)
ERYTHROCYTE [SEDIMENTATION RATE] IN BLOOD: 9 MM/HR (ref 0–20)
GLOBULIN PLAS-MCNC: 3.2 G/DL (ref 2.5–3.7)
GLUCOSE SERPL-MCNC: 170 MG/DL (ref 70–99)
HCT VFR BLD AUTO: 42.6 % (ref 41–52)
HGB BLD-MCNC: 14.1 G/DL (ref 13.5–17.5)
LIPASE SERPL-CCNC: 27 U/L (ref 22–51)
LYMPHOCYTES # BLD: 2.6 K/UL (ref 1–4)
LYMPHOCYTES NFR BLD: 29 %
MCH RBC QN AUTO: 30.5 PG (ref 27–32)
MCHC RBC AUTO-ENTMCNC: 33.1 G/DL (ref 32–37)
MCV RBC AUTO: 92 FL (ref 80–100)
MONOCYTES # BLD: 0.7 K/UL (ref 0–1)
MONOCYTES NFR BLD: 8 %
NEUTROPHILS # BLD AUTO: 5.4 K/UL (ref 1.8–7.7)
NEUTROPHILS NFR BLD: 60 %
OSMOLALITY UR CALC.SUM OF ELEC: 294 MOSM/KG (ref 275–295)
PLATELET # BLD AUTO: 258 K/UL (ref 140–400)
PMV BLD AUTO: 9.4 FL (ref 7.4–10.3)
POTASSIUM SERPL-SCNC: 4.2 MMOL/L (ref 3.3–5.1)
PROT SERPL-MCNC: 7.6 G/DL (ref 5.9–8.4)
RBC # BLD AUTO: 4.63 M/UL (ref 4.5–5.9)
SODIUM SERPL-SCNC: 141 MMOL/L (ref 136–144)
TSH SERPL-ACNC: 1.28 UIU/ML (ref 0.45–5.33)
WBC # BLD AUTO: 8.9 K/UL (ref 4–11)

## 2018-01-11 PROCEDURE — 36415 COLL VENOUS BLD VENIPUNCTURE: CPT | Performed by: PHYSICIAN ASSISTANT

## 2018-01-11 PROCEDURE — 85652 RBC SED RATE AUTOMATED: CPT | Performed by: PHYSICIAN ASSISTANT

## 2018-01-11 PROCEDURE — 93005 ELECTROCARDIOGRAM TRACING: CPT

## 2018-01-11 PROCEDURE — 85025 COMPLETE CBC W/AUTO DIFF WBC: CPT | Performed by: PHYSICIAN ASSISTANT

## 2018-01-11 PROCEDURE — 99213 OFFICE O/P EST LOW 20 MIN: CPT | Performed by: PHYSICIAN ASSISTANT

## 2018-01-11 PROCEDURE — 93010 ELECTROCARDIOGRAM REPORT: CPT | Performed by: PHYSICIAN ASSISTANT

## 2018-01-11 PROCEDURE — 83690 ASSAY OF LIPASE: CPT | Performed by: PHYSICIAN ASSISTANT

## 2018-01-11 PROCEDURE — 80053 COMPREHEN METABOLIC PANEL: CPT | Performed by: PHYSICIAN ASSISTANT

## 2018-01-11 PROCEDURE — 84443 ASSAY THYROID STIM HORMONE: CPT | Performed by: PHYSICIAN ASSISTANT

## 2018-01-11 RX ORDER — AMLODIPINE BESYLATE 5 MG/1
5 TABLET ORAL
Refills: 1 | COMMUNITY
Start: 2017-11-28 | End: 2018-01-11 | Stop reason: DRUGHIGH

## 2018-01-11 NOTE — PROGRESS NOTES
HPI:    Patient ID: Radha Mcneill is a 60year old male. Patient presents today for physical exam however has some complaints of chest pain so will change to an acute visit. Reports having left upper chest discomfort that began over the weekend.   Sym for numbness (burning sensation of left axilla radiating to left upper chest). Negative for weakness, light-headedness and headaches. Psychiatric/Behavioral: Negative for sleep disturbance. The patient is not nervous/anxious.                Current Outpat supple. Cardiovascular: Normal rate, regular rhythm, normal heart sounds and intact distal pulses. Edema not present. Pulmonary/Chest: Effort normal and breath sounds normal. He has no wheezes. He has no rales. Abdominal: Soft.  Bowel sounds are no instructions to avoid or limit certain foods and beverages in the diet that can exacerbate acid reflux and heartburn symptoms. Avoid all NSAIDs for now. Patient to contact the office if symptoms worsen or if new symptoms develop.       Orders Placed This

## 2018-01-11 NOTE — PATIENT INSTRUCTIONS
Tylenol 1-2 tablets every 4-6 hours   Stretch the left shoulder/arm a few times per day  Apply ice and/or heat to the affected area 3 times daily    Limit/avoid:  Caffeine   Soda  Smoking  Spicy/fatty foods  Chocolate  Peppermint  Anti-inflammatories (ibup

## 2018-01-13 NOTE — TELEPHONE ENCOUNTER
Refill Protocol Appointment Criteria  · Appointment scheduled in the past 12 months or in the next 3 months  Recent Outpatient Visits            2 weeks ago Acute iritis of right eye    [facility] Kristine Maravilla MD

## 2018-01-26 DIAGNOSIS — G57.93 NEUROPATHY OF BOTH FEET: ICD-10-CM

## 2018-01-26 RX ORDER — GABAPENTIN 300 MG/1
300 CAPSULE ORAL 3 TIMES DAILY PRN
Qty: 90 CAPSULE | Refills: 0 | Status: SHIPPED | OUTPATIENT
Start: 2018-01-26 | End: 2018-02-22

## 2018-01-26 NOTE — TELEPHONE ENCOUNTER
Pharmacist requesting Gabapentin refill for pt.     Last filled 7/20/17  Last OV 1/11/18    Please advise

## 2018-01-29 NOTE — TELEPHONE ENCOUNTER
L/m on [facility] 's @[employer] v/m requesting status of approval/denial for bilateral L5 TFESIs. Informed we have been waiting for response since 09/17. Await return call.

## 2018-02-07 ENCOUNTER — OFFICE VISIT (OUTPATIENT)
Dept: OPHTHALMOLOGY | Facility: CLINIC | Age: 63
End: 2018-02-07

## 2018-02-07 DIAGNOSIS — H43.393 FLOATER, VITREOUS, BILATERAL: ICD-10-CM

## 2018-02-07 DIAGNOSIS — H25.13 AGE-RELATED NUCLEAR CATARACT OF BOTH EYES: ICD-10-CM

## 2018-02-07 DIAGNOSIS — H20.00 ACUTE IRITIS OF RIGHT EYE: Primary | ICD-10-CM

## 2018-02-07 DIAGNOSIS — H53.002 AMBLYOPIA, LEFT: ICD-10-CM

## 2018-02-07 DIAGNOSIS — E11.9 DIABETES MELLITUS TYPE 2 WITHOUT RETINOPATHY (HCC): ICD-10-CM

## 2018-02-07 DIAGNOSIS — H52.13 HIGH MYOPIA, BILATERAL: ICD-10-CM

## 2018-02-07 PROCEDURE — 92014 COMPRE OPH EXAM EST PT 1/>: CPT | Performed by: OPHTHALMOLOGY

## 2018-02-07 RX ORDER — PREDNISOLONE ACETATE 10 MG/ML
SUSPENSION/ DROPS OPHTHALMIC
Qty: 1 BOTTLE | Refills: 0 | Status: SHIPPED | OUTPATIENT
Start: 2018-02-07 | End: 2018-02-21

## 2018-02-07 NOTE — PATIENT INSTRUCTIONS
Acute iritis of right eye  Patient has some slight inflammation in the right eye. Resume Pred forte twice a day in the right eye for 7 days and then once a day in the right eye for 7 days, then discontinue. Discussed with patient that iritis can reoccur. reduce pain from inflammation. Iritis from eye injury usually resolves in 1 to 2 weeks. Iritis from other causes may take several weeks to months to resolve. Home care  · Use eye drops as prescribed.   · Wear sunglasses to decrease light sensitivity and di eye  · Steroid medicines to treat inflammation  · Eye drops to dilate your eye. This may prevent some complications.   · In rare cases immunosuppressive medicines like cyclosporine or etanercept  You may have medicines as eye drops, by mouth, through an IV away if you have any of these:  · Eye pain  · Loss of some eyesight  · Symptoms that get worse, or don’t get better with treatment  · New symptoms   Date Last Reviewed: 8/10/2015  © 5876-1348 [employer] [address].  [address] [place], [place] 1 more than once. You may also have symptoms that are more severe. Diagnosing iritis  Your eye care doctor (ophthalmologist) will ask about your past health and give you an eye exam. He or she may look into your eye with a slit lamp microscope.  This is a de likely you’ll notice floaters. Floaters can also be caused by an eye injury or surgery. People who are very nearsighted may get more floaters. If floaters appear suddenly or greatly increase in number, see your healthcare provider.  This may be a sign of an

## 2018-02-07 NOTE — PROGRESS NOTES
Graciela Angulo is a 60year old male.     HPI:     HPI     Diabetic Eye Exam    Additional comments: Pt has been a diabetic for 5 years  5 years on pills/  0 years on Insulin   Pt checks his BS 4x a day   Pt's last blood sugar was  144  Last HA1C was 7.0 o tried to quit several time, and            unsuccessful. Alcohol use:  No                Medications:    Current Outpatient Prescriptions:  prednisoLONE acetate (PRED FORTE) 1 % Ophthalmic Suspension Use 1 drop 2 times a day in the right eye for 7 days, th Right Left    Lids/Lashes Dermatochalasis, Meibomian gland dysfunction Dermatochalasis, Meibomian gland dysfunction    Conjunctiva/Sclera Non-injected Non-injected    Cornea no KP Clear    Anterior Chamber deep with one cell, + pigment on ant capsule early cataracts with patient. No treatment recommended at this time. Floater, vitreous, bilateral   There is no evidence of retinal pathology. All signs and symptoms of retinal detachment/tears explained in detail.     Patient instructed to call t

## 2018-02-07 NOTE — ASSESSMENT & PLAN NOTE
Patient has some slight inflammation in the right eye. Resume Pred forte twice a day in the right eye for 7 days and then once a day in the right eye for 7 days, then discontinue. Discussed with patient that iritis can reoccur.   Told patient to watch for

## 2018-02-22 DIAGNOSIS — E11.42 CONTROLLED TYPE 2 DIABETES MELLITUS WITH DIABETIC POLYNEUROPATHY, WITHOUT LONG-TERM CURRENT USE OF INSULIN (HCC): ICD-10-CM

## 2018-02-22 DIAGNOSIS — I10 ESSENTIAL HYPERTENSION: ICD-10-CM

## 2018-02-22 DIAGNOSIS — K21.9 GASTROESOPHAGEAL REFLUX DISEASE, ESOPHAGITIS PRESENCE NOT SPECIFIED: ICD-10-CM

## 2018-02-22 DIAGNOSIS — G57.93 NEUROPATHY OF BOTH FEET: ICD-10-CM

## 2018-02-22 DIAGNOSIS — E78.00 HYPERCHOLESTEROLEMIA: ICD-10-CM

## 2018-02-22 NOTE — TELEPHONE ENCOUNTER
Hypertensive Medications  Protocol Criteria:  · Appointment scheduled in the past 6 months or in the next 3 months  · BMP or CMP in the past 12 months  · Creatinine result < 2  Recent Outpatient Visits            2 weeks ago Acute iritis of right eye    El

## 2018-02-22 NOTE — TELEPHONE ENCOUNTER
Pt is new to Mail order. Pt stts he was informed mail order can not send request to office, can only receive.      Mail order  #  [phone]    Pt asking to send refills to mail order for the following medications:      gabapentin 300 MG Oral Cap Take 1

## 2018-02-26 RX ORDER — OMEPRAZOLE 20 MG/1
20 CAPSULE, DELAYED RELEASE ORAL EVERY MORNING
Qty: 90 CAPSULE | Refills: 0 | Status: SHIPPED | OUTPATIENT
Start: 2018-02-26 | End: 2018-05-14

## 2018-02-26 RX ORDER — METFORMIN HYDROCHLORIDE 500 MG/1
2000 TABLET, EXTENDED RELEASE ORAL
Qty: 360 TABLET | Refills: 1 | Status: SHIPPED | OUTPATIENT
Start: 2018-02-26 | End: 2018-09-28

## 2018-02-26 RX ORDER — AMLODIPINE BESYLATE 10 MG/1
10 TABLET ORAL DAILY
Qty: 90 TABLET | Refills: 0 | Status: SHIPPED | OUTPATIENT
Start: 2018-02-26 | End: 2018-05-14

## 2018-02-26 RX ORDER — PRAVASTATIN SODIUM 10 MG
TABLET ORAL
Qty: 90 TABLET | Refills: 0 | Status: SHIPPED | OUTPATIENT
Start: 2018-02-26 | End: 2018-04-04

## 2018-02-26 RX ORDER — GABAPENTIN 300 MG/1
300 CAPSULE ORAL 3 TIMES DAILY PRN
Qty: 90 CAPSULE | Refills: 0 | Status: SHIPPED | OUTPATIENT
Start: 2018-02-26 | End: 2018-06-27

## 2018-02-26 RX ORDER — LOSARTAN POTASSIUM 100 MG/1
100 TABLET ORAL DAILY
Qty: 90 TABLET | Refills: 0 | Status: SHIPPED | OUTPATIENT
Start: 2018-02-26 | End: 2018-06-27

## 2018-04-02 NOTE — TELEPHONE ENCOUNTER
T/t [email or web address] Vipin W/C. States bilateral L5 TFESIs are denied because Pt. had SAGRARIO on 03/15/18 with findings of maximum medical improvement and also pre existing condition.  She will fax letter of denial.

## 2018-04-04 DIAGNOSIS — E78.00 HYPERCHOLESTEROLEMIA: ICD-10-CM

## 2018-04-04 RX ORDER — PRAVASTATIN SODIUM 10 MG
TABLET ORAL
Qty: 90 TABLET | Refills: 1 | Status: SHIPPED | OUTPATIENT
Start: 2018-04-04 | End: 2018-08-02

## 2018-04-04 NOTE — TELEPHONE ENCOUNTER
Cholesterol Medications  Protocol Criteria:  · Appointment scheduled in the past 12 months or in the next 3 months  · ALT & LDL on file in the past 12 months  · ALT result < 80  · LDL result <130   Recent Outpatient Visits            1 month ago Acute kathy

## 2018-05-14 ENCOUNTER — TELEPHONE (OUTPATIENT)
Dept: INTERNAL MEDICINE CLINIC | Facility: CLINIC | Age: 63
End: 2018-05-14

## 2018-05-14 DIAGNOSIS — K21.9 GASTROESOPHAGEAL REFLUX DISEASE, ESOPHAGITIS PRESENCE NOT SPECIFIED: ICD-10-CM

## 2018-05-14 DIAGNOSIS — I10 ESSENTIAL HYPERTENSION: ICD-10-CM

## 2018-05-14 RX ORDER — AMLODIPINE BESYLATE 10 MG/1
TABLET ORAL
Qty: 90 TABLET | Refills: 0 | Status: SHIPPED | OUTPATIENT
Start: 2018-05-14 | End: 2018-08-02

## 2018-05-14 RX ORDER — OMEPRAZOLE 20 MG/1
CAPSULE, DELAYED RELEASE ORAL
Qty: 90 CAPSULE | Refills: 0 | Status: SHIPPED | OUTPATIENT
Start: 2018-05-14 | End: 2018-08-02

## 2018-05-14 NOTE — TELEPHONE ENCOUNTER
Hypertensive Medications Protocol Passed5/14 12:08 PM   CMP or BMP in past 12 months    Serum Creatinine < 2.0    Appointment in past 6 or next 3 months       Gastrointestional Medication Protocol Passed5/14 1:25 PM   Appointment in past 12 or next 3 month

## 2018-05-16 ENCOUNTER — OFFICE VISIT (OUTPATIENT)
Dept: INTERNAL MEDICINE CLINIC | Facility: CLINIC | Age: 63
End: 2018-05-16

## 2018-05-16 VITALS
HEART RATE: 83 BPM | BODY MASS INDEX: 39.86 KG/M2 | WEIGHT: 248 LBS | DIASTOLIC BLOOD PRESSURE: 79 MMHG | SYSTOLIC BLOOD PRESSURE: 139 MMHG | HEIGHT: 66 IN

## 2018-05-16 DIAGNOSIS — M54.50 CHRONIC BILATERAL LOW BACK PAIN WITHOUT SCIATICA: ICD-10-CM

## 2018-05-16 DIAGNOSIS — I10 ESSENTIAL HYPERTENSION: ICD-10-CM

## 2018-05-16 DIAGNOSIS — G89.29 CHRONIC BILATERAL LOW BACK PAIN WITHOUT SCIATICA: ICD-10-CM

## 2018-05-16 DIAGNOSIS — E11.9 CONTROLLED TYPE 2 DIABETES MELLITUS WITHOUT COMPLICATION, WITHOUT LONG-TERM CURRENT USE OF INSULIN (HCC): Primary | ICD-10-CM

## 2018-05-16 DIAGNOSIS — F17.200 TOBACCO USE DISORDER: ICD-10-CM

## 2018-05-16 PROCEDURE — 99214 OFFICE O/P EST MOD 30 MIN: CPT | Performed by: INTERNAL MEDICINE

## 2018-05-16 PROCEDURE — 99212 OFFICE O/P EST SF 10 MIN: CPT | Performed by: INTERNAL MEDICINE

## 2018-05-16 NOTE — ASSESSMENT & PLAN NOTE
Patient is home blood sugar readings are good, however he has not done an A1c since last July. I urged him to do this today. Continue current medications for now.

## 2018-05-16 NOTE — ASSESSMENT & PLAN NOTE
Patient would like another injection in the spine, however he did not follow-up with Dr. Whit Ann regarding the insurance issues. I advised him to schedule another appointment  And I gave him a referral if needed.   Patient is requesting a handicap placard bu

## 2018-05-16 NOTE — PROGRESS NOTES
HPI:    Patient ID: Marcela Hargrove is a 60year old male. Pt saw Dr. Meg Stack in September and was going to get another injection in his back, but he has not been able to get it approved. The previous injection in the past helped. He retired in October. Rfl: 0   OMEPRAZOLE 20 MG Oral Capsule Delayed Release TAKE 1 CAPSULE EVERY MORNING Disp: 90 capsule Rfl: 0   PRAVASTATIN SODIUM 10 MG Oral Tab TAKE 1 TABLET BY MOUTH AT BEDTIME Disp: 90 tablet Rfl: 1   gabapentin 300 MG Oral Cap Take 1 capsule (300 mg tot atraumatic. Eyes: EOM are normal.   Cardiovascular: Normal rate. Pulmonary/Chest: Effort normal. No respiratory distress. Musculoskeletal:        Lumbar back: He exhibits tenderness and pain. He exhibits normal range of motion and no spasm.    Neurol

## 2018-05-17 ENCOUNTER — APPOINTMENT (OUTPATIENT)
Dept: LAB | Age: 63
End: 2018-05-17
Attending: INTERNAL MEDICINE
Payer: COMMERCIAL

## 2018-05-17 DIAGNOSIS — E11.9 CONTROLLED TYPE 2 DIABETES MELLITUS WITHOUT COMPLICATION, WITHOUT LONG-TERM CURRENT USE OF INSULIN (HCC): ICD-10-CM

## 2018-05-17 PROCEDURE — 82043 UR ALBUMIN QUANTITATIVE: CPT

## 2018-05-17 PROCEDURE — 83036 HEMOGLOBIN GLYCOSYLATED A1C: CPT

## 2018-05-17 PROCEDURE — 82570 ASSAY OF URINE CREATININE: CPT

## 2018-05-17 PROCEDURE — 36415 COLL VENOUS BLD VENIPUNCTURE: CPT

## 2018-05-17 PROCEDURE — 80048 BASIC METABOLIC PNL TOTAL CA: CPT

## 2018-06-27 DIAGNOSIS — G57.93 NEUROPATHY OF BOTH FEET: ICD-10-CM

## 2018-06-27 DIAGNOSIS — I10 ESSENTIAL HYPERTENSION: ICD-10-CM

## 2018-06-27 RX ORDER — GABAPENTIN 300 MG/1
CAPSULE ORAL
Qty: 90 CAPSULE | Refills: 0 | Status: SHIPPED | OUTPATIENT
Start: 2018-06-27 | End: 2018-08-02

## 2018-06-27 RX ORDER — LOSARTAN POTASSIUM 100 MG/1
TABLET ORAL
Qty: 90 TABLET | Refills: 0 | Status: SHIPPED | OUTPATIENT
Start: 2018-06-27 | End: 2018-08-20

## 2018-06-27 NOTE — TELEPHONE ENCOUNTER
Hypertensive Medications  Protocol Criteria:  · Appointment scheduled in the past 6 months or in the next 3 months  · BMP or CMP in the past 12 months  · Creatinine result < 2  Recent Outpatient Visits            1 month ago Controlled type 2 diabetes edna Tremayne Geller MD    Office Visit    4 months ago Acute iritis of right eye    [facility] Dameon Muniz MD    Office Visit    5 months ago Chest wall discomfort    [facility]

## 2018-08-02 DIAGNOSIS — G57.93 NEUROPATHY OF BOTH FEET: ICD-10-CM

## 2018-08-02 DIAGNOSIS — E78.00 HYPERCHOLESTEROLEMIA: ICD-10-CM

## 2018-08-02 DIAGNOSIS — I10 ESSENTIAL HYPERTENSION: ICD-10-CM

## 2018-08-02 DIAGNOSIS — K21.9 GASTROESOPHAGEAL REFLUX DISEASE, ESOPHAGITIS PRESENCE NOT SPECIFIED: ICD-10-CM

## 2018-08-02 RX ORDER — OMEPRAZOLE 20 MG/1
CAPSULE, DELAYED RELEASE ORAL
Qty: 90 CAPSULE | Refills: 0 | Status: SHIPPED | OUTPATIENT
Start: 2018-08-02 | End: 2018-10-23

## 2018-08-02 RX ORDER — AMLODIPINE BESYLATE 10 MG/1
TABLET ORAL
Qty: 90 TABLET | Refills: 0 | Status: SHIPPED | OUTPATIENT
Start: 2018-08-02 | End: 2018-10-23

## 2018-08-02 RX ORDER — GABAPENTIN 300 MG/1
CAPSULE ORAL
Qty: 90 CAPSULE | Refills: 0 | Status: SHIPPED | OUTPATIENT
Start: 2018-08-02 | End: 2018-08-18

## 2018-08-03 RX ORDER — PRAVASTATIN SODIUM 10 MG
TABLET ORAL
Qty: 90 TABLET | Refills: 0 | Status: SHIPPED | OUTPATIENT
Start: 2018-08-03 | End: 2018-10-23

## 2018-08-03 NOTE — TELEPHONE ENCOUNTER
Failed per nursing protocol - please advise on refill request     Cholesterol Medications  Protocol Criteria:  · Appointment scheduled in the past 12 months or in the next 3 months  · ALT & LDL on file in the past 12 months  · ALT result < 80  · LDL resul Office Visit    7 months ago Acute iritis of right eye    [facility] Rogelio Robins MD    Office Visit    7 months ago Acute iritis of right eye    [facility] Rogelio Robins, 01/11/2018   ALT 29 01/11/2018   BILT 0.3 01/11/2018   TP 7.6 01/11/2018   ALB 4.4 01/11/2018    05/17/2018   K 4.1 05/17/2018    05/17/2018   CO2 26 05/17/2018   GLOBULIN 3.2 01/11/2018   AGRATIO 1.2 05/21/2013   ANIONGAP 8 05/17/2018   OSMOCA

## 2018-08-18 DIAGNOSIS — G57.93 NEUROPATHY OF BOTH FEET: ICD-10-CM

## 2018-08-18 RX ORDER — GABAPENTIN 300 MG/1
300 CAPSULE ORAL 3 TIMES DAILY PRN
Qty: 270 CAPSULE | Refills: 0 | Status: SHIPPED | OUTPATIENT
Start: 2018-08-18 | End: 2018-10-23

## 2018-08-18 NOTE — TELEPHONE ENCOUNTER
Refill passed per [facility] protocol.     Refill Protocol Appointment Criteria  · Appointment scheduled in the past 6 months or in the next 3 months  Recent Outpatient Visits            3 months ago Controlled type 2 diabetes mellitus without complica

## 2018-08-20 ENCOUNTER — OFFICE VISIT (OUTPATIENT)
Dept: INTERNAL MEDICINE CLINIC | Facility: CLINIC | Age: 63
End: 2018-08-20
Payer: COMMERCIAL

## 2018-08-20 VITALS
BODY MASS INDEX: 39.92 KG/M2 | SYSTOLIC BLOOD PRESSURE: 158 MMHG | RESPIRATION RATE: 18 BRPM | HEART RATE: 77 BPM | WEIGHT: 248.38 LBS | DIASTOLIC BLOOD PRESSURE: 71 MMHG | HEIGHT: 66 IN | TEMPERATURE: 98 F

## 2018-08-20 DIAGNOSIS — E78.00 HYPERCHOLESTEROLEMIA: ICD-10-CM

## 2018-08-20 DIAGNOSIS — F17.200 TOBACCO USE DISORDER: ICD-10-CM

## 2018-08-20 DIAGNOSIS — Z00.00 ROUTINE HEALTH MAINTENANCE: Primary | ICD-10-CM

## 2018-08-20 DIAGNOSIS — I10 ESSENTIAL HYPERTENSION: ICD-10-CM

## 2018-08-20 DIAGNOSIS — G89.29 CHRONIC BILATERAL LOW BACK PAIN WITHOUT SCIATICA: ICD-10-CM

## 2018-08-20 DIAGNOSIS — B35.3 TINEA PEDIS OF LEFT FOOT: ICD-10-CM

## 2018-08-20 DIAGNOSIS — E11.9 CONTROLLED TYPE 2 DIABETES MELLITUS WITHOUT COMPLICATION, WITHOUT LONG-TERM CURRENT USE OF INSULIN (HCC): ICD-10-CM

## 2018-08-20 DIAGNOSIS — I42.9 PRIMARY CARDIOMYOPATHY (HCC): ICD-10-CM

## 2018-08-20 DIAGNOSIS — E66.01 CLASS 3 SEVERE OBESITY DUE TO EXCESS CALORIES WITH SERIOUS COMORBIDITY AND BODY MASS INDEX (BMI) OF 40.0 TO 44.9 IN ADULT (HCC): ICD-10-CM

## 2018-08-20 DIAGNOSIS — M54.50 CHRONIC BILATERAL LOW BACK PAIN WITHOUT SCIATICA: ICD-10-CM

## 2018-08-20 PROCEDURE — 99396 PREV VISIT EST AGE 40-64: CPT | Performed by: INTERNAL MEDICINE

## 2018-08-20 PROCEDURE — 99213 OFFICE O/P EST LOW 20 MIN: CPT | Performed by: INTERNAL MEDICINE

## 2018-08-20 RX ORDER — LOSARTAN POTASSIUM AND HYDROCHLOROTHIAZIDE 25; 100 MG/1; MG/1
1 TABLET ORAL DAILY
Qty: 90 TABLET | Refills: 1 | Status: SHIPPED | OUTPATIENT
Start: 2018-08-20 | End: 2018-10-23

## 2018-08-20 NOTE — ASSESSMENT & PLAN NOTE
Pt thinks his sugar control is better. Will check A1C. He saw Dr. Erika Segal in February. + microalbuminuria. On losartan. F/u 2 months.

## 2018-08-20 NOTE — PROGRESS NOTES
HPI:    Patient ID: Myrna Londono is a 58year old male. Pt is here for a physical.    He has been exercising every day for 40 min. He still has chronic back and knee pain.   His systolic blood pressure at home runs 130-145      Diabetes   He presents problem. Skin: Positive for rash. Negative for wound. Allergic/Immunologic: Positive for environmental allergies. Negative for food allergies. Neurological: Negative for headaches. Psychiatric/Behavioral: Positive for depressed mood.  Negative for s Mother    • Diabetes Mother    • Cancer Sister      ovarian   • Ovarian Cancer Sister    • Heart Disease Brother      x2   • Cancer Brother      bladder cancer   • Other [OTHER] Father      killed in [address] [facility] accident   • Breast Cancer Cousin    • Cancer of motion, no deformity and no right foot bunion. Left foot: There is normal range of motion, no deformity and no left foot bunion. Feet: diabetic foot exam performed    Right Foot:   Protective Sensation: 5 sites tested. 5 sites sensed.    Skin In 2 months. Chronic bilateral low back pain without sciatica     F/u with Dr. Dalila Sainz.          Tinea pedis of left foot    Relevant Medications    nystatin-triamcinolone 278648-8.1 UNIT/GM-% External Cream            The patient expressed understanding

## 2018-08-20 NOTE — PATIENT INSTRUCTIONS
Do fasting labs after August 27th. Fast for 12 hours. Water is okay. Walk in.     Stop losartan when you start the losartan/hctz

## 2018-08-20 NOTE — ASSESSMENT & PLAN NOTE
Unremarkable exam.  Pt is up to date on his colonoscopy. Counseled pt regarding diet and exercise. Urged labs.

## 2018-08-27 ENCOUNTER — LAB ENCOUNTER (OUTPATIENT)
Dept: LAB | Age: 63
End: 2018-08-27
Attending: INTERNAL MEDICINE
Payer: COMMERCIAL

## 2018-08-27 DIAGNOSIS — Z00.00 ROUTINE HEALTH MAINTENANCE: ICD-10-CM

## 2018-08-27 LAB
ALBUMIN SERPL BCP-MCNC: 4.4 G/DL (ref 3.5–4.8)
ALBUMIN/GLOB SERPL: 1.3 {RATIO} (ref 1–2)
ALP SERPL-CCNC: 52 U/L (ref 32–100)
ALT SERPL-CCNC: 34 U/L (ref 17–63)
ANION GAP SERPL CALC-SCNC: 9 MMOL/L (ref 0–18)
AST SERPL-CCNC: 33 U/L (ref 15–41)
BASOPHILS # BLD: 0 K/UL (ref 0–0.2)
BASOPHILS NFR BLD: 0 %
BILIRUB SERPL-MCNC: 0.5 MG/DL (ref 0.3–1.2)
BUN SERPL-MCNC: 8 MG/DL (ref 8–20)
BUN/CREAT SERPL: 8.6 (ref 10–20)
CALCIUM SERPL-MCNC: 9.3 MG/DL (ref 8.5–10.5)
CHLORIDE SERPL-SCNC: 99 MMOL/L (ref 95–110)
CHOLEST SERPL-MCNC: 139 MG/DL (ref 110–200)
CO2 SERPL-SCNC: 28 MMOL/L (ref 22–32)
CREAT SERPL-MCNC: 0.93 MG/DL (ref 0.5–1.5)
EOSINOPHIL # BLD: 0.1 K/UL (ref 0–0.7)
EOSINOPHIL NFR BLD: 1 %
ERYTHROCYTE [DISTWIDTH] IN BLOOD BY AUTOMATED COUNT: 14.9 % (ref 11–15)
GLOBULIN PLAS-MCNC: 3.3 G/DL (ref 2.5–3.7)
GLUCOSE SERPL-MCNC: 154 MG/DL (ref 70–99)
HBA1C MFR BLD: 7.8 % (ref 4–6)
HCT VFR BLD AUTO: 40.7 % (ref 41–52)
HDLC SERPL-MCNC: 33 MG/DL
HGB BLD-MCNC: 13.6 G/DL (ref 13.5–17.5)
LDLC SERPL CALC-MCNC: 81 MG/DL (ref 0–99)
LYMPHOCYTES # BLD: 2 K/UL (ref 1–4)
LYMPHOCYTES NFR BLD: 26 %
MCH RBC QN AUTO: 30.2 PG (ref 27–32)
MCHC RBC AUTO-ENTMCNC: 33.3 G/DL (ref 32–37)
MCV RBC AUTO: 90.7 FL (ref 80–100)
MONOCYTES # BLD: 0.8 K/UL (ref 0–1)
MONOCYTES NFR BLD: 10 %
NEUTROPHILS # BLD AUTO: 4.8 K/UL (ref 1.8–7.7)
NEUTROPHILS NFR BLD: 62 %
NONHDLC SERPL-MCNC: 106 MG/DL
OSMOLALITY UR CALC.SUM OF ELEC: 283 MOSM/KG (ref 275–295)
PATIENT FASTING: YES
PLATELET # BLD AUTO: 275 K/UL (ref 140–400)
PMV BLD AUTO: 8.8 FL (ref 7.4–10.3)
POTASSIUM SERPL-SCNC: 4.1 MMOL/L (ref 3.3–5.1)
PROT SERPL-MCNC: 7.7 G/DL (ref 5.9–8.4)
PSA SERPL-MCNC: 1.2 NG/ML (ref 0–4)
RBC # BLD AUTO: 4.49 M/UL (ref 4.5–5.9)
SODIUM SERPL-SCNC: 136 MMOL/L (ref 136–144)
TRIGL SERPL-MCNC: 127 MG/DL (ref 1–149)
WBC # BLD AUTO: 7.7 K/UL (ref 4–11)

## 2018-08-27 PROCEDURE — 80061 LIPID PANEL: CPT

## 2018-08-27 PROCEDURE — 83036 HEMOGLOBIN GLYCOSYLATED A1C: CPT

## 2018-08-27 PROCEDURE — 85025 COMPLETE CBC W/AUTO DIFF WBC: CPT

## 2018-08-27 PROCEDURE — 36415 COLL VENOUS BLD VENIPUNCTURE: CPT

## 2018-08-27 PROCEDURE — 80053 COMPREHEN METABOLIC PANEL: CPT

## 2018-09-04 ENCOUNTER — TELEPHONE (OUTPATIENT)
Dept: NEUROLOGY | Facility: CLINIC | Age: 63
End: 2018-09-04

## 2018-09-04 ENCOUNTER — OFFICE VISIT (OUTPATIENT)
Dept: NEUROLOGY | Facility: CLINIC | Age: 63
End: 2018-09-04
Payer: COMMERCIAL

## 2018-09-04 VITALS — DIASTOLIC BLOOD PRESSURE: 80 MMHG | SYSTOLIC BLOOD PRESSURE: 140 MMHG | RESPIRATION RATE: 19 BRPM | HEART RATE: 94 BPM

## 2018-09-04 DIAGNOSIS — G89.29 CHRONIC BILATERAL LOW BACK PAIN WITH LEFT-SIDED SCIATICA: ICD-10-CM

## 2018-09-04 DIAGNOSIS — M54.42 CHRONIC BILATERAL LOW BACK PAIN WITH LEFT-SIDED SCIATICA: ICD-10-CM

## 2018-09-04 DIAGNOSIS — M51.9 LUMBAR DISC DISEASE: Primary | ICD-10-CM

## 2018-09-04 DIAGNOSIS — M43.10 RETROLISTHESIS: ICD-10-CM

## 2018-09-04 DIAGNOSIS — M48.061 LUMBAR FORAMINAL STENOSIS: ICD-10-CM

## 2018-09-04 DIAGNOSIS — M54.16 LEFT LUMBAR RADICULOPATHY: ICD-10-CM

## 2018-09-04 DIAGNOSIS — M43.16 SPONDYLOLISTHESIS, LUMBAR REGION: ICD-10-CM

## 2018-09-04 PROCEDURE — 99214 OFFICE O/P EST MOD 30 MIN: CPT | Performed by: PHYSICAL MEDICINE & REHABILITATION

## 2018-09-04 RX ORDER — NAPROXEN SODIUM 220 MG
1-2 TABLET ORAL AS NEEDED
COMMUNITY
End: 2020-09-08

## 2018-09-04 NOTE — TELEPHONE ENCOUNTER
[facility] Online for authorization of approval for bilateral L5 TFESIs cpt codes [id number], [id number]. Approval was given with Authorization # [id number] for one unit/DOS 09/21/18. Will inform Nursing.

## 2018-09-04 NOTE — PATIENT INSTRUCTIONS
As of October 6th 2014, the Drug Enforcement Agency [facility]) is reclassifying all hydrocodone combination medications from Schedule III to Schedule II. This includes medications such as Norco, Vicodin, Lortab, Zohydro, and Vicoprofen.     What this means for y will perform bilateral L5 TFESI(s). He will start PT on the lumbar spine. The patient does not need any pain medications at this time.     The patient will follow up in 3 months, but the patient will call me 2 weeks after having the injection to let m

## 2018-09-04 NOTE — PROGRESS NOTES
Low Back Pain H & P    Chief Complaint: Patient presents with:  Low Back Pain: pt here for follow up with continied lower back pain, left>right that radiates down the thighs to the calf increased with prolonged sitting/walking.  LOV 9/5/17 did not get recom uncontrolled    • Unspecified essential hypertension        Past Surgical History   Past Surgical History:  2012: APPENDECTOMY  2003: COLONOSCOPY  11-: ELECTROCARDIOGRAM, COMPLETE      Comment: scanned to media tab  No date: 1 [employer] lesions.     Vitals:   09/04/18  1426   BP: 140/80   Pulse: 94   Resp: 19       Gait:    Gait: Normal gait   Sit to Stand: mild difficulty    RIGHT Walking on Toes: no difficulty   LEFT Walking on Toes: no difficulty   RIGHT Walking on Heels: no difficulty any pain medications at this time. The patient will follow up in 3 months, but the patient will call me 2 weeks after having the injection to let me know how the injection worked. The patient understands and agrees with the stated plan. Jose Moreland

## 2018-09-04 NOTE — PROGRESS NOTES
Patient has been scheduled for a bilateral L5(L5-S1)TFESI  on 9/21/2018 at the [facility]. Medications and allergies reviewed. Patient informed to hold aspirins, nsaids, blood thinners, vitamins and fish oils 3-7 days prior to procedure.  Patient informed we will

## 2018-09-06 ENCOUNTER — TELEPHONE (OUTPATIENT)
Dept: NEUROLOGY | Facility: CLINIC | Age: 63
End: 2018-09-06

## 2018-09-06 NOTE — TELEPHONE ENCOUNTER
Spoke to patient who states he is changing insurance and would like to cancel bilateral L5 TFESIs on 9/21/18. Patient states he will call back once new insurance information is obtained and then would like injection order resubmitted.    Left message with V

## 2018-09-28 DIAGNOSIS — E11.42 CONTROLLED TYPE 2 DIABETES MELLITUS WITH DIABETIC POLYNEUROPATHY, WITHOUT LONG-TERM CURRENT USE OF INSULIN (HCC): ICD-10-CM

## 2018-09-30 RX ORDER — METFORMIN HYDROCHLORIDE 500 MG/1
TABLET, EXTENDED RELEASE ORAL
Qty: 360 TABLET | Refills: 1 | Status: SHIPPED | OUTPATIENT
Start: 2018-09-30 | End: 2018-10-23

## 2018-10-23 ENCOUNTER — MED REC SCAN ONLY (OUTPATIENT)
Dept: INTERNAL MEDICINE CLINIC | Facility: CLINIC | Age: 63
End: 2018-10-23

## 2018-10-23 ENCOUNTER — OFFICE VISIT (OUTPATIENT)
Dept: INTERNAL MEDICINE CLINIC | Facility: CLINIC | Age: 63
End: 2018-10-23
Payer: COMMERCIAL

## 2018-10-23 VITALS
WEIGHT: 250 LBS | HEART RATE: 78 BPM | HEIGHT: 66 IN | BODY MASS INDEX: 40.18 KG/M2 | DIASTOLIC BLOOD PRESSURE: 92 MMHG | SYSTOLIC BLOOD PRESSURE: 156 MMHG

## 2018-10-23 DIAGNOSIS — I10 ESSENTIAL HYPERTENSION: ICD-10-CM

## 2018-10-23 DIAGNOSIS — K21.9 GASTROESOPHAGEAL REFLUX DISEASE, ESOPHAGITIS PRESENCE NOT SPECIFIED: ICD-10-CM

## 2018-10-23 DIAGNOSIS — E11.42 CONTROLLED TYPE 2 DIABETES MELLITUS WITH DIABETIC POLYNEUROPATHY, WITHOUT LONG-TERM CURRENT USE OF INSULIN (HCC): ICD-10-CM

## 2018-10-23 DIAGNOSIS — G89.29 CHRONIC LOW BACK PAIN WITH LEFT-SIDED SCIATICA, UNSPECIFIED BACK PAIN LATERALITY: Primary | ICD-10-CM

## 2018-10-23 DIAGNOSIS — M54.42 CHRONIC LOW BACK PAIN WITH LEFT-SIDED SCIATICA, UNSPECIFIED BACK PAIN LATERALITY: Primary | ICD-10-CM

## 2018-10-23 DIAGNOSIS — E78.00 HYPERCHOLESTEROLEMIA: ICD-10-CM

## 2018-10-23 DIAGNOSIS — Z23 NEED FOR VACCINATION: ICD-10-CM

## 2018-10-23 PROCEDURE — 90686 IIV4 VACC NO PRSV 0.5 ML IM: CPT | Performed by: INTERNAL MEDICINE

## 2018-10-23 PROCEDURE — 90471 IMMUNIZATION ADMIN: CPT | Performed by: INTERNAL MEDICINE

## 2018-10-23 PROCEDURE — 99212 OFFICE O/P EST SF 10 MIN: CPT | Performed by: INTERNAL MEDICINE

## 2018-10-23 PROCEDURE — 99214 OFFICE O/P EST MOD 30 MIN: CPT | Performed by: INTERNAL MEDICINE

## 2018-10-23 PROCEDURE — 90732 PPSV23 VACC 2 YRS+ SUBQ/IM: CPT | Performed by: INTERNAL MEDICINE

## 2018-10-23 PROCEDURE — 90472 IMMUNIZATION ADMIN EACH ADD: CPT | Performed by: INTERNAL MEDICINE

## 2018-10-23 RX ORDER — METFORMIN HYDROCHLORIDE 500 MG/1
TABLET, EXTENDED RELEASE ORAL
Qty: 360 TABLET | Refills: 1 | Status: SHIPPED | OUTPATIENT
Start: 2018-10-23 | End: 2019-01-10

## 2018-10-23 RX ORDER — GABAPENTIN 300 MG/1
300 CAPSULE ORAL 3 TIMES DAILY PRN
Qty: 270 CAPSULE | Refills: 1 | Status: SHIPPED | OUTPATIENT
Start: 2018-10-23 | End: 2019-01-22

## 2018-10-23 RX ORDER — LOSARTAN POTASSIUM AND HYDROCHLOROTHIAZIDE 25; 100 MG/1; MG/1
1 TABLET ORAL DAILY
Qty: 90 TABLET | Refills: 1 | Status: SHIPPED | OUTPATIENT
Start: 2018-10-23 | End: 2019-01-22

## 2018-10-23 RX ORDER — OMEPRAZOLE 20 MG/1
CAPSULE, DELAYED RELEASE ORAL
Qty: 90 CAPSULE | Refills: 3 | Status: SHIPPED | OUTPATIENT
Start: 2018-10-23 | End: 2019-01-22

## 2018-10-23 RX ORDER — AMLODIPINE BESYLATE 10 MG/1
10 TABLET ORAL
Qty: 90 TABLET | Refills: 1 | Status: SHIPPED | OUTPATIENT
Start: 2018-10-23 | End: 2019-01-22

## 2018-10-23 RX ORDER — PRAVASTATIN SODIUM 10 MG
TABLET ORAL
Qty: 90 TABLET | Refills: 3 | Status: SHIPPED | OUTPATIENT
Start: 2018-10-23 | End: 2019-01-22

## 2018-10-23 NOTE — PROGRESS NOTES
HPI:    Patient ID: Anh Butron is a 58year old male. Pt's back pain is very bad. He was supposed to get an injection, but he didn't have insurance for 1 month. Now he has insurance again. He thinks his blood pressure is high because of the pain. The patient is not nervous/anxious.         Past Surgical History:   Procedure Laterality Date   • APPENDECTOMY  2012   • COLONOSCOPY  2003   • ELECTROCARDIOGRAM, COMPLETE  11-    scanned to media tab   • KNEE SURGERY              Current Outpatient Father         killed in [address] [place] [address] Ext accident   • Breast Cancer Cousin    • Cancer Brother         bladder   • Lipids Neg         hyperlipidemia   • Glaucoma Neg    • Macular degeneration Neg        .BP (!) 156/92 (BP Location: Right arm, Patient Position: Labs were done in August.  Cholesterol is excellent. Continue low dose pravastatin.          Relevant Medications    Pravastatin Sodium 10 MG Oral Tab    Chronic low back pain with left-sided sciatica - Primary     The patient continues to have low back pa

## 2018-10-24 NOTE — ASSESSMENT & PLAN NOTE
The patient's blood pressure is high today, but he thinks that is due to his back pain. We will recheck it at his next office visit.

## 2018-10-24 NOTE — ASSESSMENT & PLAN NOTE
Patient's diabetes was not controlled. His A1c was 7.8. He thinks that it will be better this next time he gets it checked because he has been watching his diet more as home readings are better.

## 2018-10-24 NOTE — ASSESSMENT & PLAN NOTE
The patient continues to have low back pain and now that he has insurance he plans to schedule an injection.

## 2018-11-26 RX ORDER — GLIMEPIRIDE 2 MG/1
2 TABLET ORAL
Qty: 90 TABLET | Refills: 0 | Status: SHIPPED | OUTPATIENT
Start: 2018-11-26 | End: 2019-01-22

## 2018-11-28 ENCOUNTER — APPOINTMENT (OUTPATIENT)
Dept: LAB | Facility: HOSPITAL | Age: 63
End: 2018-11-28
Attending: INTERNAL MEDICINE
Payer: COMMERCIAL

## 2018-11-28 ENCOUNTER — TELEPHONE (OUTPATIENT)
Dept: INTERNAL MEDICINE CLINIC | Facility: CLINIC | Age: 63
End: 2018-11-28

## 2018-11-28 DIAGNOSIS — E11.42 CONTROLLED TYPE 2 DIABETES MELLITUS WITH DIABETIC POLYNEUROPATHY, WITHOUT LONG-TERM CURRENT USE OF INSULIN (HCC): ICD-10-CM

## 2018-11-28 PROCEDURE — 36415 COLL VENOUS BLD VENIPUNCTURE: CPT

## 2018-11-28 PROCEDURE — 83036 HEMOGLOBIN GLYCOSYLATED A1C: CPT

## 2018-11-28 NOTE — TELEPHONE ENCOUNTER
Patient requesting an order for non fasting labs due in November. Patient went to lab this morning but was informed no order was in system.

## 2019-01-10 DIAGNOSIS — E11.42 CONTROLLED TYPE 2 DIABETES MELLITUS WITH DIABETIC POLYNEUROPATHY, WITHOUT LONG-TERM CURRENT USE OF INSULIN (HCC): ICD-10-CM

## 2019-01-10 RX ORDER — METFORMIN HYDROCHLORIDE 500 MG/1
TABLET, EXTENDED RELEASE ORAL
Qty: 360 TABLET | Refills: 0 | Status: SHIPPED | OUTPATIENT
Start: 2019-01-10 | End: 2019-04-06

## 2019-01-11 NOTE — TELEPHONE ENCOUNTER
Refill passed per [facility] protocol.   Diabetes Medications  Protocol Criteria:  · Appointment scheduled in the past 6 months or the next 3 months  · A1C < 7.5 in the past 6 months  · Creatinine in the past 12 months  · Creatinine result < 1.5   Rece

## 2019-01-22 NOTE — PROGRESS NOTES
HPI:    Patient ID: Cherelle Bradford is a 58year old male. His blood sugars run 113-120s in the morning. His back pain is bad. Diabetes   He presents for his follow-up diabetic visit. He has type 2 diabetes mellitus.  His disease course has been st Rfl: 1   AmLODIPine Besylate 10 MG Oral Tab Take 1 tablet (10 mg total) by mouth once daily. Disp: 90 tablet Rfl: 3   gabapentin 300 MG Oral Cap Take 1 capsule (300 mg total) by mouth 3 (three) times daily as needed.  Disp: 180 capsule Rfl: 1   Losartan Pot Position: Sitting, Cuff Size: large)   Pulse 80   Ht 5' 6\" (1.676 m)   Wt 253 lb 4.8 oz (114.9 kg)   BMI 40.88 kg/m²   PHYSICAL EXAM:   Physical Exam   Nursing note and vitals reviewed. Constitutional: He is oriented to person, place, and time.  He appea and agreed with the plan.     Meds This Visit:  Requested Prescriptions     Signed Prescriptions Disp Refills   • glimepiride 2 MG Oral Tab 90 tablet 1     Sig: Take 1 tablet (2 mg total) by mouth daily with breakfast.   • AmLODIPine Besylate 10 MG Oral Tab

## 2019-01-22 NOTE — ASSESSMENT & PLAN NOTE
A1C improved and is 7.2  Taking ARB and statin. Eye exam is due next month. Pt is exercising. Urged pt to eat smaller portions. Repeat A1C in 3 months and f/u appointment afterwards.

## 2019-03-12 ENCOUNTER — TELEPHONE (OUTPATIENT)
Dept: INTERNAL MEDICINE CLINIC | Facility: CLINIC | Age: 64
End: 2019-03-12

## 2019-03-12 NOTE — TELEPHONE ENCOUNTER
[facility] calling to clarify fax received about supplies for Pt diabetic and it states order report, please advise

## 2019-03-13 RX ORDER — LANCETS
EACH MISCELLANEOUS
Qty: 300 EACH | Refills: 11 | Status: SHIPPED | OUTPATIENT
Start: 2019-03-13 | End: 2021-12-16

## 2019-03-13 RX ORDER — BLOOD SUGAR DIAGNOSTIC
STRIP MISCELLANEOUS
Qty: 300 STRIP | Refills: 11 | Status: SHIPPED | OUTPATIENT
Start: 2019-03-13 | End: 2020-06-19

## 2019-03-13 NOTE — TELEPHONE ENCOUNTER
Pt requesting diabetic supplies as noted below. No supplies noted in med profile in EMR. Prescriptions pended for review.     Please advise

## 2019-03-13 NOTE — TELEPHONE ENCOUNTER
Pt req 3 month supply for supplies ACCU CHEK GUIDE TEST  STRIPES and FAS CLIX LANCETS and directions should show Pt is testing 3x day and states out of supplies.

## 2019-04-05 DIAGNOSIS — E11.42 CONTROLLED TYPE 2 DIABETES MELLITUS WITH DIABETIC POLYNEUROPATHY, WITHOUT LONG-TERM CURRENT USE OF INSULIN (HCC): ICD-10-CM

## 2019-04-05 RX ORDER — METFORMIN HYDROCHLORIDE 500 MG/1
TABLET, EXTENDED RELEASE ORAL
Qty: 360 TABLET | Refills: 0 | Status: CANCELLED | OUTPATIENT
Start: 2019-04-05

## 2019-04-06 DIAGNOSIS — E11.42 CONTROLLED TYPE 2 DIABETES MELLITUS WITH DIABETIC POLYNEUROPATHY, WITHOUT LONG-TERM CURRENT USE OF INSULIN (HCC): ICD-10-CM

## 2019-04-07 RX ORDER — METFORMIN HYDROCHLORIDE 500 MG/1
TABLET, EXTENDED RELEASE ORAL
Qty: 360 TABLET | Refills: 0 | Status: SHIPPED | OUTPATIENT
Start: 2019-04-07 | End: 2019-07-07

## 2019-04-08 NOTE — TELEPHONE ENCOUNTER
Review pended refill request as it does not fall under a protocol. Last Rx: 10/23/18  LOV: 01/22/19.

## 2019-04-11 ENCOUNTER — APPOINTMENT (OUTPATIENT)
Dept: LAB | Age: 64
End: 2019-04-11
Attending: INTERNAL MEDICINE
Payer: COMMERCIAL

## 2019-04-11 DIAGNOSIS — E11.42 CONTROLLED TYPE 2 DIABETES MELLITUS WITH DIABETIC POLYNEUROPATHY, WITHOUT LONG-TERM CURRENT USE OF INSULIN (HCC): ICD-10-CM

## 2019-04-11 PROCEDURE — 82043 UR ALBUMIN QUANTITATIVE: CPT

## 2019-04-11 PROCEDURE — 36415 COLL VENOUS BLD VENIPUNCTURE: CPT

## 2019-04-11 PROCEDURE — 83036 HEMOGLOBIN GLYCOSYLATED A1C: CPT

## 2019-04-11 PROCEDURE — 80048 BASIC METABOLIC PNL TOTAL CA: CPT

## 2019-04-11 PROCEDURE — 82570 ASSAY OF URINE CREATININE: CPT

## 2019-04-23 ENCOUNTER — OFFICE VISIT (OUTPATIENT)
Dept: INTERNAL MEDICINE CLINIC | Facility: CLINIC | Age: 64
End: 2019-04-23
Payer: COMMERCIAL

## 2019-04-23 VITALS
DIASTOLIC BLOOD PRESSURE: 75 MMHG | BODY MASS INDEX: 40.82 KG/M2 | WEIGHT: 254 LBS | HEIGHT: 66 IN | SYSTOLIC BLOOD PRESSURE: 126 MMHG | HEART RATE: 76 BPM

## 2019-04-23 DIAGNOSIS — I10 ESSENTIAL HYPERTENSION: ICD-10-CM

## 2019-04-23 DIAGNOSIS — E11.42 CONTROLLED TYPE 2 DIABETES MELLITUS WITH DIABETIC POLYNEUROPATHY, WITHOUT LONG-TERM CURRENT USE OF INSULIN (HCC): Primary | ICD-10-CM

## 2019-04-23 DIAGNOSIS — F17.200 TOBACCO USE DISORDER: ICD-10-CM

## 2019-04-23 DIAGNOSIS — K21.9 GASTROESOPHAGEAL REFLUX DISEASE, ESOPHAGITIS PRESENCE NOT SPECIFIED: ICD-10-CM

## 2019-04-23 PROBLEM — B35.3 TINEA PEDIS OF LEFT FOOT: Status: RESOLVED | Noted: 2018-08-20 | Resolved: 2019-04-23

## 2019-04-23 PROCEDURE — 99214 OFFICE O/P EST MOD 30 MIN: CPT | Performed by: INTERNAL MEDICINE

## 2019-04-23 PROCEDURE — 99212 OFFICE O/P EST SF 10 MIN: CPT | Performed by: INTERNAL MEDICINE

## 2019-04-23 RX ORDER — GABAPENTIN 300 MG/1
300 CAPSULE ORAL 3 TIMES DAILY PRN
Qty: 180 CAPSULE | Refills: 1 | Status: SHIPPED | OUTPATIENT
Start: 2019-04-23 | End: 2019-07-16

## 2019-04-23 RX ORDER — OMEPRAZOLE 20 MG/1
CAPSULE, DELAYED RELEASE ORAL
Qty: 90 CAPSULE | Refills: 3 | Status: SHIPPED | OUTPATIENT
Start: 2019-04-23 | End: 2020-06-30

## 2019-04-23 RX ORDER — BUPROPION HYDROCHLORIDE 150 MG/1
150 TABLET ORAL DAILY
Qty: 30 TABLET | Refills: 0 | Status: SHIPPED | OUTPATIENT
Start: 2019-04-23 | End: 2019-05-14

## 2019-04-23 RX ORDER — GLIMEPIRIDE 2 MG/1
2 TABLET ORAL
Qty: 90 TABLET | Refills: 1 | Status: SHIPPED | OUTPATIENT
Start: 2019-04-23 | End: 2019-05-14

## 2019-04-23 NOTE — ASSESSMENT & PLAN NOTE
His A1C was 7.4. He is not eating as much and he is exercising. He doesn't understand why he is not losing weight. The patient does not have retinopathy.

## 2019-04-23 NOTE — PATIENT INSTRUCTIONS
Start Wellbutrin tomorrow. Quit smoking May 1st AND start using nicotine patch 14 mg daily. Continue Wellbutrin. Next non-fasting A1C test in late July.

## 2019-04-23 NOTE — ASSESSMENT & PLAN NOTE
Discussed medication to assist quitting smoking. Will try Wellbutrin. Discussed possible side effects. Set a quit date. Pt will follow-up 1-2 weeks after quitting.

## 2019-04-23 NOTE — PROGRESS NOTES
HPI:    Patient ID: Vickie Savage is a 58year old male. His back is hurting him. He had labs done. He needs refills. His A1C went up to 7.6. He gets cramps in his left hand--randomly. It has been going on for over a year.     Diabetes   He presen COMPLETE  11-    scanned to media tab   • KNEE SURGERY              Current Outpatient Medications:  omeprazole 20 MG Oral Capsule Delayed Release TAKE 1 CAPSULE EVERY MORNING Disp: 90 capsule Rfl: 3   gabapentin 300 MG Oral Cap Take 1 capsule (300 0.0 oz      Comment: very rare    Drug use: No    Family History   Problem Relation Age of Onset   • Heart Disease Mother    • Diabetes Mother    • Cancer Sister         ovarian   • Ovarian Cancer Sister    • Heart Disease Brother         x2   • Cancer Bro disorder     Discussed medication to assist quitting smoking. Will try Wellbutrin. Discussed possible side effects. Set a quit date. Pt will follow-up 1-2 weeks after quitting.            Relevant Medications    buPROPion HCl ER, XL, 150 MG Oral Tablet

## 2019-05-03 DIAGNOSIS — I10 ESSENTIAL HYPERTENSION: ICD-10-CM

## 2019-05-03 RX ORDER — METOPROLOL SUCCINATE 50 MG/1
50 TABLET, EXTENDED RELEASE ORAL EVERY EVENING
Qty: 90 TABLET | Refills: 1 | Status: SHIPPED | OUTPATIENT
Start: 2019-05-03 | End: 2019-05-14

## 2019-05-14 ENCOUNTER — OFFICE VISIT (OUTPATIENT)
Dept: INTERNAL MEDICINE CLINIC | Facility: CLINIC | Age: 64
End: 2019-05-14
Payer: COMMERCIAL

## 2019-05-14 VITALS
WEIGHT: 250.63 LBS | HEART RATE: 85 BPM | BODY MASS INDEX: 40 KG/M2 | DIASTOLIC BLOOD PRESSURE: 70 MMHG | SYSTOLIC BLOOD PRESSURE: 129 MMHG

## 2019-05-14 DIAGNOSIS — E11.42 CONTROLLED TYPE 2 DIABETES MELLITUS WITH DIABETIC POLYNEUROPATHY, WITHOUT LONG-TERM CURRENT USE OF INSULIN (HCC): ICD-10-CM

## 2019-05-14 DIAGNOSIS — I10 ESSENTIAL HYPERTENSION: ICD-10-CM

## 2019-05-14 DIAGNOSIS — Z87.891 EX-SMOKER: ICD-10-CM

## 2019-05-14 PROCEDURE — 99213 OFFICE O/P EST LOW 20 MIN: CPT | Performed by: INTERNAL MEDICINE

## 2019-05-14 PROCEDURE — 99212 OFFICE O/P EST SF 10 MIN: CPT | Performed by: INTERNAL MEDICINE

## 2019-05-14 RX ORDER — GLIMEPIRIDE 2 MG/1
2 TABLET ORAL
Qty: 90 TABLET | Refills: 1 | Status: SHIPPED | OUTPATIENT
Start: 2019-05-14 | End: 2019-08-15

## 2019-05-14 RX ORDER — METOPROLOL SUCCINATE 50 MG/1
50 TABLET, EXTENDED RELEASE ORAL EVERY EVENING
Qty: 90 TABLET | Refills: 1 | Status: SHIPPED | OUTPATIENT
Start: 2019-05-14 | End: 2019-10-25

## 2019-05-14 RX ORDER — BUPROPION HYDROCHLORIDE 150 MG/1
150 TABLET ORAL DAILY
Qty: 90 TABLET | Refills: 1 | Status: SHIPPED | OUTPATIENT
Start: 2019-05-14 | End: 2020-02-25

## 2019-05-14 NOTE — ASSESSMENT & PLAN NOTE
Pt has quit smoking. Encouragement given. Advised pt that he should never have \"just one. \" Continue Wellbutrin and patch. F/u in 3 months.

## 2019-05-14 NOTE — PROGRESS NOTES
HPI:    Patient ID: Marlon Loya is a 58year old male. He quit smoking 10 days ago. He is taking the bupropion and the nicotine patch. He does have cravings, but they don't last very long.   He quit smoking once in the past for 6 months and them re ACCU-CHEK FASTCLIX LANCETS Does not apply Misc To monitor blood sugar three times daily Disp: 300 each Rfl: 11   AmLODIPine Besylate 10 MG Oral Tab Take 1 tablet (10 mg total) by mouth once daily.  Disp: 90 tablet Rfl: 3   Losartan Potassium-HCTZ 100-25 M normal. No respiratory distress. Neurological: He is alert and oriented to person, place, and time. Skin: Skin is warm and dry. Psychiatric: He has a normal mood and affect.  Thought content normal.              ASSESSMENT/PLAN:     Problem List Items

## 2019-06-11 ENCOUNTER — OFFICE VISIT (OUTPATIENT)
Dept: OPHTHALMOLOGY | Facility: CLINIC | Age: 64
End: 2019-06-11
Payer: COMMERCIAL

## 2019-06-11 DIAGNOSIS — H43.393 FLOATER, VITREOUS, BILATERAL: ICD-10-CM

## 2019-06-11 DIAGNOSIS — H52.13 HIGH MYOPIA, BILATERAL: ICD-10-CM

## 2019-06-11 DIAGNOSIS — H53.002 AMBLYOPIA, LEFT: ICD-10-CM

## 2019-06-11 DIAGNOSIS — H40.003 GLAUCOMA SUSPECT OF BOTH EYES: ICD-10-CM

## 2019-06-11 DIAGNOSIS — H25.13 AGE-RELATED NUCLEAR CATARACT OF BOTH EYES: ICD-10-CM

## 2019-06-11 DIAGNOSIS — E11.9 DIABETES MELLITUS TYPE 2 WITHOUT RETINOPATHY (HCC): Primary | ICD-10-CM

## 2019-06-11 PROCEDURE — 92015 DETERMINE REFRACTIVE STATE: CPT | Performed by: OPHTHALMOLOGY

## 2019-06-11 PROCEDURE — 92014 COMPRE OPH EXAM EST PT 1/>: CPT | Performed by: OPHTHALMOLOGY

## 2019-06-11 PROCEDURE — 92250 FUNDUS PHOTOGRAPHY W/I&R: CPT | Performed by: OPHTHALMOLOGY

## 2019-06-11 NOTE — ASSESSMENT & PLAN NOTE
Discussed with patient that he is a glaucoma suspect based on increased cupping of the optic nerves in both eyes. Retinal photos taken today to document optic nerves. Glaucoma diagnostic testing ordered.   Will not start medication, but will continue to ob

## 2019-06-11 NOTE — PROGRESS NOTES
Anh Burton is a 58year old male.     HPI:     HPI     Diabetic Eye Exam      Additional comments: Pt has been a diabetic for 6 years  6  years on pills/  0 years on Insulin   Pt checks his/her BS once a day   Pt's last blood sugar was 132 yesterday hyperlipidemia   • Glaucoma Neg    • Macular degeneration Neg        Social History: Social History    Tobacco Use      Smoking status: Current Every Day Smoker        Packs/day: 1.00        Years: 45.00        Pack years: 45        Types: Cigarett Oral Tab Take 81 mg by mouth daily.  Disp:  Rfl:        Allergies:  No Known Allergies    ROS:     ROS     Positive for: Endocrine, Eyes    Negative for: Constitutional, Gastrointestinal, Neurological, Skin, Genitourinary, Musculoskeletal, HENT, Cardiovascu Manifest Refraction       [facility] [place] [place] [facility] [place]    Right -9.50 +2.00 170 20/25 +2.25 20/20    Left -12.00 +2.00 030 20/40+ +2.25 20/30          Final Rx       [facility] [place] [facility] [place]    Right -9.50 +2.00 170 20/25

## 2019-06-11 NOTE — PATIENT INSTRUCTIONS
Diabetes mellitus type 2 without retinopathy ([facility] [address]  Diabetes type II: no background of retinopathy, no signs of neovascularization noted. Discussed ocular and systemic benefits of blood sugar control.   Diagnosis and treatment discussed in detail with paty your vision is lost.  Closed-angle glaucoma  Closed-angle glaucoma is less common than open-angle. It usually comes on quickly. The drainage area in the eye suddenly becomes completely blocked. Eye pressure builds rapidly.  You may notice blurred vision and

## 2019-06-12 ENCOUNTER — NURSE ONLY (OUTPATIENT)
Dept: OPHTHALMOLOGY | Facility: CLINIC | Age: 64
End: 2019-06-12
Payer: COMMERCIAL

## 2019-06-12 ENCOUNTER — TELEPHONE (OUTPATIENT)
Dept: OPHTHALMOLOGY | Facility: CLINIC | Age: 64
End: 2019-06-12

## 2019-06-12 DIAGNOSIS — H40.003 GLAUCOMA SUSPECT OF BOTH EYES: ICD-10-CM

## 2019-06-12 PROCEDURE — 92083 EXTENDED VISUAL FIELD XM: CPT | Performed by: OPHTHALMOLOGY

## 2019-06-12 PROCEDURE — 76514 ECHO EXAM OF EYE THICKNESS: CPT | Performed by: OPHTHALMOLOGY

## 2019-06-12 PROCEDURE — 92133 CPTRZD OPH DX IMG PST SGM ON: CPT | Performed by: OPHTHALMOLOGY

## 2019-06-12 RX ORDER — LATANOPROST 50 UG/ML
1 SOLUTION/ DROPS OPHTHALMIC NIGHTLY
Qty: 3 BOTTLE | Refills: 3 | Status: SHIPPED | OUTPATIENT
Start: 2019-06-12 | End: 2020-02-09

## 2019-06-12 NOTE — TELEPHONE ENCOUNTER
Spoke to pt to go over new Dx of glaucoma. Pt will start Latanoprost OU qhs and return in October for IOP check. rx pended to [facility].

## 2019-06-12 NOTE — PROGRESS NOTES
Deepali Wasserman is a 58year old male.     HPI:     HPI     Here for a VF, OCT and PACHY with no MD.     Last edited by Yolie Willingham O.T. on 6/12/2019 10:58 AM. (History)        Patient History:  Past Medical History:   Diagnosis Date   • Age-related Take 1 tablet (50 mg total) by mouth every evening. Disp: 90 tablet Rfl: 1   buPROPion HCl ER, XL, 150 MG Oral Tablet 24 Hr Take 1 tablet (150 mg total) by mouth daily.  Disp: 90 tablet Rfl: 1   omeprazole 20 MG Oral Capsule Delayed Release TAKE 1 CAPSULE E

## 2019-06-17 ENCOUNTER — TELEPHONE (OUTPATIENT)
Dept: OPHTHALMOLOGY | Facility: CLINIC | Age: 64
End: 2019-06-17

## 2019-06-17 NOTE — TELEPHONE ENCOUNTER
I told him to continue Latanoprost qhs OU and to try Alaway or Zaditor (OTC) drops in the morning. If symptoms do not improve after 1 week, told patient to call back and we can schedule an appointment for him for Dr. Nick Reyes to evaluate.   I told him to n

## 2019-06-17 NOTE — TELEPHONE ENCOUNTER
Pt called stating pt was rx. Latanoprost at night. Started 6-12-19. In the morning pt's eyes are itchy. Pt is rubbing them.    Please call to advise

## 2019-06-17 NOTE — TELEPHONE ENCOUNTER
Eyes are itching, burning, watering and he keeps rubbing since starting the Latanoprost drops OU on 6/12/19. He says that the symptoms last for hours when he first gets up in the morning. He denies any redness.

## 2019-07-07 DIAGNOSIS — E11.42 CONTROLLED TYPE 2 DIABETES MELLITUS WITH DIABETIC POLYNEUROPATHY, WITHOUT LONG-TERM CURRENT USE OF INSULIN (HCC): ICD-10-CM

## 2019-07-08 RX ORDER — METFORMIN HYDROCHLORIDE 500 MG/1
TABLET, EXTENDED RELEASE ORAL
Qty: 360 TABLET | Refills: 0 | Status: SHIPPED | OUTPATIENT
Start: 2019-07-08 | End: 2019-10-08

## 2019-07-16 DIAGNOSIS — E11.42 CONTROLLED TYPE 2 DIABETES MELLITUS WITH DIABETIC POLYNEUROPATHY, WITHOUT LONG-TERM CURRENT USE OF INSULIN (HCC): ICD-10-CM

## 2019-07-16 RX ORDER — GABAPENTIN 300 MG/1
300 CAPSULE ORAL 3 TIMES DAILY PRN
Qty: 270 CAPSULE | Refills: 1 | Status: SHIPPED | OUTPATIENT
Start: 2019-07-16 | End: 2020-04-06

## 2019-07-31 ENCOUNTER — APPOINTMENT (OUTPATIENT)
Dept: LAB | Age: 64
End: 2019-07-31
Attending: INTERNAL MEDICINE
Payer: COMMERCIAL

## 2019-07-31 DIAGNOSIS — E11.42 CONTROLLED TYPE 2 DIABETES MELLITUS WITH DIABETIC POLYNEUROPATHY, WITHOUT LONG-TERM CURRENT USE OF INSULIN (HCC): ICD-10-CM

## 2019-07-31 LAB
EST. AVERAGE GLUCOSE BLD GHB EST-MCNC: 146 MG/DL (ref 68–126)
HBA1C MFR BLD HPLC: 6.7 % (ref ?–5.7)

## 2019-07-31 PROCEDURE — 83036 HEMOGLOBIN GLYCOSYLATED A1C: CPT

## 2019-07-31 PROCEDURE — 36415 COLL VENOUS BLD VENIPUNCTURE: CPT

## 2019-08-15 ENCOUNTER — OFFICE VISIT (OUTPATIENT)
Dept: INTERNAL MEDICINE CLINIC | Facility: CLINIC | Age: 64
End: 2019-08-15
Payer: COMMERCIAL

## 2019-08-15 VITALS
DIASTOLIC BLOOD PRESSURE: 75 MMHG | HEIGHT: 66 IN | HEART RATE: 58 BPM | BODY MASS INDEX: 40.48 KG/M2 | SYSTOLIC BLOOD PRESSURE: 122 MMHG | WEIGHT: 251.88 LBS

## 2019-08-15 DIAGNOSIS — E11.42 CONTROLLED TYPE 2 DIABETES MELLITUS WITH DIABETIC POLYNEUROPATHY, WITHOUT LONG-TERM CURRENT USE OF INSULIN (HCC): Primary | ICD-10-CM

## 2019-08-15 DIAGNOSIS — I10 ESSENTIAL HYPERTENSION: ICD-10-CM

## 2019-08-15 DIAGNOSIS — Z00.00 ROUTINE HEALTH MAINTENANCE: ICD-10-CM

## 2019-08-15 DIAGNOSIS — L28.0 LICHEN SIMPLEX CHRONICUS: ICD-10-CM

## 2019-08-15 PROCEDURE — 99214 OFFICE O/P EST MOD 30 MIN: CPT | Performed by: INTERNAL MEDICINE

## 2019-08-15 RX ORDER — GLIMEPIRIDE 2 MG/1
2 TABLET ORAL
Qty: 90 TABLET | Refills: 1 | Status: SHIPPED | OUTPATIENT
Start: 2019-08-15 | End: 2019-12-27

## 2019-08-15 RX ORDER — CLOBETASOL PROPIONATE 0.5 MG/G
CREAM TOPICAL
Qty: 30 G | Refills: 5 | Status: SHIPPED | OUTPATIENT
Start: 2019-08-15 | End: 2019-12-01

## 2019-08-15 RX ORDER — AMLODIPINE BESYLATE 10 MG/1
10 TABLET ORAL
Qty: 90 TABLET | Refills: 3 | Status: SHIPPED | OUTPATIENT
Start: 2019-08-15 | End: 2020-10-23

## 2019-08-15 NOTE — PROGRESS NOTES
HPI:    Patient ID: Tevin Barrios is a 71year old male. Pt has been exercising and his A1C came down to 6.7. Pt c/o foot pain. The skin is splitting.   He saw a dermatologist in the past and was treated with an oral fungal, but it didn't help the sk with breakfast. Disp: 90 tablet Rfl: 1   amLODIPine Besylate 10 MG Oral Tab Take 1 tablet (10 mg total) by mouth once daily.  Disp: 90 tablet Rfl: 3   Clobetasol Propionate 0.05 % External Cream Apply to ankle bid Disp: 30 g Rfl: 5   GABAPENTIN 300 MG Oral Comment: very rare    Drug use: No    Family History   Problem Relation Age of Onset   • Heart Disease Mother    • Diabetes Mother    • Cancer Sister         ovarian   • Ovarian Cancer Sister    • Heart Disease Brother         x2   • Cancer Brother health maintenance     Check labs and schedule cpx in November.          Relevant Orders    CBC WITH DIFFERENTIAL WITH PLATELET    COMP METABOLIC PANEL (14)    HEMOGLOBIN A1C    MICROALB/CREAT RATIO, RANDOM URINE    LIPID PANEL    VITAMIN B12    FREE T4 (FR

## 2019-08-15 NOTE — PATIENT INSTRUCTIONS
Do fasting labs 1 week or so before your next appointment. Please schedule that appointment in November. Fast for 12 hours. Water and meds are okay. Walk in.

## 2019-10-01 DIAGNOSIS — I10 ESSENTIAL HYPERTENSION: ICD-10-CM

## 2019-10-02 RX ORDER — LOSARTAN POTASSIUM AND HYDROCHLOROTHIAZIDE 25; 100 MG/1; MG/1
TABLET ORAL
Qty: 90 TABLET | Refills: 1 | Status: SHIPPED | OUTPATIENT
Start: 2019-10-02 | End: 2020-09-08

## 2019-10-03 NOTE — TELEPHONE ENCOUNTER
Refill passed per [facility], [facility] protocol.   Hypertensive Medications  Protocol Criteria:  · Appointment scheduled in the past 6 months or in the next 3 months  · BMP or CMP in the past 12 months  · Creatinine result < 2  Recent Outpatient Visits

## 2019-10-04 ENCOUNTER — TELEPHONE (OUTPATIENT)
Dept: INTERNAL MEDICINE CLINIC | Facility: CLINIC | Age: 64
End: 2019-10-04

## 2019-10-04 RX ORDER — LOSARTAN POTASSIUM 100 MG/1
100 TABLET ORAL DAILY
Qty: 90 TABLET | Refills: 1 | Status: SHIPPED | OUTPATIENT
Start: 2019-10-04 | End: 2020-03-26

## 2019-10-04 RX ORDER — HYDROCHLOROTHIAZIDE 25 MG/1
25 TABLET ORAL DAILY
Qty: 90 TABLET | Refills: 1 | Status: SHIPPED | OUTPATIENT
Start: 2019-10-04 | End: 2020-03-27

## 2019-10-04 NOTE — TELEPHONE ENCOUNTER
Requested Prescriptions     Signed Prescriptions Disp Refills   • losartan 100 MG Oral Tab 90 tablet 1     Sig: Take 1 tablet (100 mg total) by mouth daily.      Authorizing Provider: Chuck Noel   • hydrochlorothiazide 25 MG Oral Tab 90 tablet 1     Sig

## 2019-10-04 NOTE — TELEPHONE ENCOUNTER
Fax received at [address] [place] [address] with following message. Alternative requested; This is on backorder. Please resend as 2 separate medications or change order. Thank you.         Current Outpatient Medications:  LOSARTAN POTASSIUM-HCTZ 100-25 MG Oral Tab TAKE 1 TA

## 2019-10-08 DIAGNOSIS — E11.42 CONTROLLED TYPE 2 DIABETES MELLITUS WITH DIABETIC POLYNEUROPATHY, WITHOUT LONG-TERM CURRENT USE OF INSULIN (HCC): ICD-10-CM

## 2019-10-10 RX ORDER — METFORMIN HYDROCHLORIDE 500 MG/1
TABLET, EXTENDED RELEASE ORAL
Qty: 360 TABLET | Refills: 1 | Status: SHIPPED | OUTPATIENT
Start: 2019-10-10 | End: 2020-03-29

## 2019-10-10 NOTE — TELEPHONE ENCOUNTER
Refill passed per [facility], [facility] protocol.       Diabetes Medications  Protocol Criteria:  · Appointment scheduled in the past 6 months or the next 3 months  · A1C < 7.5 in the past 6 months  · Creatinine in the past 12 months  · Creatinine result < 1.5

## 2019-10-15 ENCOUNTER — OFFICE VISIT (OUTPATIENT)
Dept: OPHTHALMOLOGY | Facility: CLINIC | Age: 64
End: 2019-10-15
Payer: COMMERCIAL

## 2019-10-15 DIAGNOSIS — H40.1132 PRIMARY OPEN ANGLE GLAUCOMA (POAG) OF BOTH EYES, MODERATE STAGE: Primary | ICD-10-CM

## 2019-10-15 PROCEDURE — 99213 OFFICE O/P EST LOW 20 MIN: CPT | Performed by: OPHTHALMOLOGY

## 2019-10-15 NOTE — ASSESSMENT & PLAN NOTE
IOP has improved since starting Latanoprost in both eyes! It has gone from 21 in each eye to 15 in each eye. He is using the drops as directed and denies any side effects to the drop. Continue Latanoprost at bedtime in both eyes.       Discussed potent

## 2019-10-15 NOTE — PROGRESS NOTES
Amy Hawkins is a 71year old male. HPI:     HPI     Here for an IOP check. Pt was started on Latanoprost OU qhs on 6/12/19 and has been using drops as directed. Pt states that his vision is stable.      Last edited by Lillie Collins OT on 10/15/2019 TABLETS DAILY WITH BREAKFAST, Disp: 360 tablet, Rfl: 1  losartan 100 MG Oral Tab, Take 1 tablet (100 mg total) by mouth daily. , Disp: 90 tablet, Rfl: 1  hydrochlorothiazide 25 MG Oral Tab, Take 1 tablet (25 mg total) by mouth daily. , Disp: 90 tablet, Rfl: Dist ph cc  20/30    Correction:  Glasses          Tonometry (Applanation, 11:01 AM)       Right Left    Pressure 15 15          Pachymetry (6/12/2019)       Right Left    Thickness 564/-1 571/-2          Pupils       Pupils    Right PERRL    Left PERRL of the defined types were placed in this encounter. Meds This Visit:  Requested Prescriptions      No prescriptions requested or ordered in this encounter        Follow up instructions:  Return in about 4 months (around 2/15/2020) for IOP check.     10

## 2019-10-15 NOTE — PATIENT INSTRUCTIONS
Primary open angle glaucoma (POAG) of both eyes, moderate stage  IOP has improved since starting Latanoprost in both eyes! It has gone from 21 in each eye to 15 in each eye. He is using the drops as directed and denies any side effects to the drop.   Co Eye pressure builds rapidly. You may notice blurred vision and rainbow halos around lights. You may also have headaches, nausea, vomiting, and severe pain. If not treated right away, blindness can happen quickly.   Date Last Reviewed: 10/1/2017  © [id number] you to see visual images. Eye fluid is constantly produced within the eye. Excess fluid drains out into the bloodstream.  Open-angle glaucoma is a condition where the fluid pressure in the eye slowly increases and damages the optic nerve.  This causes a gra an exercise program that’s right for you. · Protect your eyes. An eye injury can cause increased eye pressure. Wear safety glasses or goggles when you play sports, use tools or machinery, or work with chemicals.   Follow-up care  Follow up with your eye do

## 2019-10-25 DIAGNOSIS — I10 ESSENTIAL HYPERTENSION: ICD-10-CM

## 2019-10-28 RX ORDER — METOPROLOL SUCCINATE 50 MG/1
50 TABLET, EXTENDED RELEASE ORAL EVERY EVENING
Qty: 90 TABLET | Refills: 1 | Status: SHIPPED | OUTPATIENT
Start: 2019-10-28 | End: 2020-05-07

## 2019-11-05 ENCOUNTER — LAB ENCOUNTER (OUTPATIENT)
Dept: LAB | Age: 64
End: 2019-11-05
Attending: INTERNAL MEDICINE
Payer: COMMERCIAL

## 2019-11-05 DIAGNOSIS — Z00.00 ROUTINE HEALTH MAINTENANCE: ICD-10-CM

## 2019-11-05 PROCEDURE — 80061 LIPID PANEL: CPT

## 2019-11-05 PROCEDURE — 82570 ASSAY OF URINE CREATININE: CPT

## 2019-11-05 PROCEDURE — 85025 COMPLETE CBC W/AUTO DIFF WBC: CPT

## 2019-11-05 PROCEDURE — 80053 COMPREHEN METABOLIC PANEL: CPT

## 2019-11-05 PROCEDURE — 82043 UR ALBUMIN QUANTITATIVE: CPT

## 2019-11-05 PROCEDURE — 83036 HEMOGLOBIN GLYCOSYLATED A1C: CPT

## 2019-11-05 PROCEDURE — 36415 COLL VENOUS BLD VENIPUNCTURE: CPT

## 2019-11-05 PROCEDURE — 84439 ASSAY OF FREE THYROXINE: CPT

## 2019-11-05 PROCEDURE — 82607 VITAMIN B-12: CPT

## 2019-11-14 ENCOUNTER — LAB ENCOUNTER (OUTPATIENT)
Dept: LAB | Facility: HOSPITAL | Age: 64
End: 2019-11-14
Attending: INTERNAL MEDICINE
Payer: COMMERCIAL

## 2019-11-14 ENCOUNTER — OFFICE VISIT (OUTPATIENT)
Dept: INTERNAL MEDICINE CLINIC | Facility: CLINIC | Age: 64
End: 2019-11-14
Payer: COMMERCIAL

## 2019-11-14 VITALS
SYSTOLIC BLOOD PRESSURE: 147 MMHG | HEIGHT: 66 IN | DIASTOLIC BLOOD PRESSURE: 80 MMHG | RESPIRATION RATE: 16 BRPM | BODY MASS INDEX: 40.2 KG/M2 | HEART RATE: 71 BPM | WEIGHT: 250.13 LBS | TEMPERATURE: 98 F

## 2019-11-14 DIAGNOSIS — I42.9 PRIMARY CARDIOMYOPATHY (HCC): ICD-10-CM

## 2019-11-14 DIAGNOSIS — Z63.6 CAREGIVER STRESS: ICD-10-CM

## 2019-11-14 DIAGNOSIS — Z23 NEED FOR VACCINATION: ICD-10-CM

## 2019-11-14 DIAGNOSIS — G89.29 CHRONIC LEFT-SIDED LOW BACK PAIN WITH LEFT-SIDED SCIATICA: ICD-10-CM

## 2019-11-14 DIAGNOSIS — D64.9 ANEMIA, UNSPECIFIED TYPE: ICD-10-CM

## 2019-11-14 DIAGNOSIS — E11.42 CONTROLLED TYPE 2 DIABETES MELLITUS WITH DIABETIC POLYNEUROPATHY, WITHOUT LONG-TERM CURRENT USE OF INSULIN (HCC): ICD-10-CM

## 2019-11-14 DIAGNOSIS — I10 ESSENTIAL HYPERTENSION: ICD-10-CM

## 2019-11-14 DIAGNOSIS — Z00.00 ROUTINE HEALTH MAINTENANCE: Primary | ICD-10-CM

## 2019-11-14 DIAGNOSIS — M54.42 CHRONIC LEFT-SIDED LOW BACK PAIN WITH LEFT-SIDED SCIATICA: ICD-10-CM

## 2019-11-14 PROBLEM — H20.00 ACUTE IRITIS OF RIGHT EYE: Status: RESOLVED | Noted: 2017-12-20 | Resolved: 2019-11-14

## 2019-11-14 PROCEDURE — 82728 ASSAY OF FERRITIN: CPT

## 2019-11-14 PROCEDURE — 99214 OFFICE O/P EST MOD 30 MIN: CPT | Performed by: INTERNAL MEDICINE

## 2019-11-14 PROCEDURE — 90670 PCV13 VACCINE IM: CPT | Performed by: INTERNAL MEDICINE

## 2019-11-14 PROCEDURE — 83540 ASSAY OF IRON: CPT

## 2019-11-14 PROCEDURE — 84466 ASSAY OF TRANSFERRIN: CPT

## 2019-11-14 PROCEDURE — 90472 IMMUNIZATION ADMIN EACH ADD: CPT | Performed by: INTERNAL MEDICINE

## 2019-11-14 PROCEDURE — 90662 IIV NO PRSV INCREASED AG IM: CPT | Performed by: INTERNAL MEDICINE

## 2019-11-14 PROCEDURE — 99397 PER PM REEVAL EST PAT 65+ YR: CPT | Performed by: INTERNAL MEDICINE

## 2019-11-14 PROCEDURE — 36415 COLL VENOUS BLD VENIPUNCTURE: CPT

## 2019-11-14 PROCEDURE — 85025 COMPLETE CBC W/AUTO DIFF WBC: CPT

## 2019-11-14 PROCEDURE — 90471 IMMUNIZATION ADMIN: CPT | Performed by: INTERNAL MEDICINE

## 2019-11-14 SDOH — SOCIAL STABILITY - SOCIAL INSECURITY: DEPENDENT RELATIVE NEEDING CARE AT HOME: Z63.6

## 2019-11-14 NOTE — ASSESSMENT & PLAN NOTE
The patient is very stressed out dealing with his in-laws with dementia. He does not want medication or therapy at this time. He will call if he changes his mind.

## 2019-11-14 NOTE — ASSESSMENT & PLAN NOTE
The patient's A1c went up a bit to 7.1. He is up-to-date on his ophthalmology appointment. He is exercising. Continue current medications.

## 2019-11-14 NOTE — ASSESSMENT & PLAN NOTE
Patient has new onset anemia. His stool was negative today for blood. I will check iron studies. I advised him that he if he has iron deficiency anemia most likely cause of this is microscopic GI blood loss.   I advised him that if he does have iron defi

## 2019-11-14 NOTE — PROGRESS NOTES
HPI:    Patient ID: Rufino Montgomery is a 71year old male. Pt is here for a physical and to discuss problems. His in-laws have dementia and pt is extremely stressed. He had blood test done. His A1c was 7.1. He has a new onset anemia.   Other tests Laterality Date   • APPENDECTOMY  2012   • COLONOSCOPY  2003   • ELECTROCARDIOGRAM, COMPLETE  11-    scanned to media tab   • KNEE SURGERY            Current Outpatient Medications   Medication Sig Dispense Refill   • METOPROLOL SUCCINATE ER 50 MG O Smoking status: Former Smoker        Packs/day: 1.00        Years: 45.00        Pack years: 39        Types: Cigarettes        Start date: 2019        Quit date: 2019        Years since quittin.5      Smokeless tobacco: Never Used      Tobacco hepatosplenomegaly. There is no tenderness. Genitourinary:    Prostate, testes, penis and rectum normal.   Rectum:      Guaiac result negative. No rectal mass, tenderness or abnormal anal tone. Prostate is not enlarged and not tender.  Right testis 7.1.  He is up-to-date on his ophthalmology appointment. He is exercising. Continue current medications. Chronic low back pain with left-sided sciatica     Controlled. Continue present management.          Caregiver stress     The patient is very

## 2019-11-14 NOTE — ASSESSMENT & PLAN NOTE
Patient's blood pressure is high today, but he feels stressed regarding issues with his family. We will recheck his blood pressure at his next office visit.

## 2019-11-14 NOTE — PATIENT INSTRUCTIONS
Non-fasting labs today. If you have iron deficiency anemia, I would like for you to see the gastroenterologist.  Melvin Renee saw Dr. Karlo Chavarria in 2013 [phone]. Regarding your diabetes. Your next A1C is due in February.

## 2019-11-14 NOTE — ASSESSMENT & PLAN NOTE
Unremarkable exam.  Pt is up to date on his colonoscopy. Counseled pt regarding diet and exercise. Reviewed labs with pt.

## 2019-12-01 DIAGNOSIS — L28.0 LICHEN SIMPLEX CHRONICUS: ICD-10-CM

## 2019-12-02 RX ORDER — CLOBETASOL PROPIONATE 0.5 MG/G
CREAM TOPICAL
Qty: 45 G | Refills: 3 | Status: SHIPPED | OUTPATIENT
Start: 2019-12-02 | End: 2020-02-09

## 2019-12-02 NOTE — TELEPHONE ENCOUNTER
Review pended refill request as it does not fall under a protocol.   Requested Prescriptions     Pending Prescriptions Disp Refills   • CLOBETASOL PROPIONATE 0.05 % External Cream [Pharmacy Med Name: CLOBETASOL 0.05% CREAM] 45 g 3     Sig: APPLY TO ANKLE TW

## 2019-12-09 ENCOUNTER — OFFICE VISIT (OUTPATIENT)
Dept: OTOLARYNGOLOGY | Facility: CLINIC | Age: 64
End: 2019-12-09
Payer: COMMERCIAL

## 2019-12-09 ENCOUNTER — OFFICE VISIT (OUTPATIENT)
Dept: INTERNAL MEDICINE CLINIC | Facility: CLINIC | Age: 64
End: 2019-12-09
Payer: COMMERCIAL

## 2019-12-09 ENCOUNTER — NURSE TRIAGE (OUTPATIENT)
Dept: INTERNAL MEDICINE CLINIC | Facility: CLINIC | Age: 64
End: 2019-12-09

## 2019-12-09 VITALS
HEIGHT: 66 IN | DIASTOLIC BLOOD PRESSURE: 77 MMHG | WEIGHT: 250 LBS | SYSTOLIC BLOOD PRESSURE: 130 MMHG | BODY MASS INDEX: 40.18 KG/M2 | TEMPERATURE: 98 F | RESPIRATION RATE: 16 BRPM | HEART RATE: 67 BPM

## 2019-12-09 VITALS
SYSTOLIC BLOOD PRESSURE: 117 MMHG | BODY MASS INDEX: 40.18 KG/M2 | DIASTOLIC BLOOD PRESSURE: 66 MMHG | HEIGHT: 66 IN | WEIGHT: 250 LBS | TEMPERATURE: 97 F

## 2019-12-09 DIAGNOSIS — H61.23 BILATERAL IMPACTED CERUMEN: Primary | ICD-10-CM

## 2019-12-09 DIAGNOSIS — H93.8X2 CLOGGED EAR, LEFT: Primary | ICD-10-CM

## 2019-12-09 PROBLEM — H61.20 CERUMEN IMPACTION: Status: ACTIVE | Noted: 2019-12-09

## 2019-12-09 PROCEDURE — 92504 EAR MICROSCOPY EXAMINATION: CPT | Performed by: OTOLARYNGOLOGY

## 2019-12-09 PROCEDURE — 99243 OFF/OP CNSLTJ NEW/EST LOW 30: CPT | Performed by: OTOLARYNGOLOGY

## 2019-12-09 PROCEDURE — 99213 OFFICE O/P EST LOW 20 MIN: CPT | Performed by: NURSE PRACTITIONER

## 2019-12-09 NOTE — PROGRESS NOTES
HPI:    Patient ID: Chris Villanueva is a 71year old male. HPI Patient states his ears are clogged since last week  Left ear feels stuffed up. .  [place] tooth removed from left lower area on Friday. Patient has not had any pain.   He states he had a hx TAKE 1 CAPSULE (300 MG TOTAL) BY MOUTH 3 (THREE) TIMES DAILY AS NEEDED. 270 capsule 1   • latanoprost 0.005 % Ophthalmic Solution Place 1 drop into both eyes nightly.  Instill 1 drop by ophthalmic route every night into both eyes 3 Bottle 3   • omeprazole 2 Coordination normal.   Skin: Skin is warm and dry. No rash noted. Psychiatric: He has a normal mood and affect.  Thought content normal.     /77   Pulse 67   Temp 98.2 °F (36.8 °C) (Oral)   Resp 16   Ht 5' 6\" (1.676 m)   Wt 250 lb (113.4 kg)   BMI

## 2019-12-09 NOTE — PROGRESS NOTES
Rehana Negrete is a 71year old male. Patient presents with:  Ear Problem: both ears    HPI:   He had a tooth extracted about a week ago because he was having pains within the tooth and in his ear.   He still having some pain and fullness and blockage in h Naproxen Sodium (ALEVE) 220 MG Oral Tab Take 1-2 tablets by mouth as needed. • aspirin 81 MG Oral Tab Take 81 mg by mouth daily.         Past Medical History:   Diagnosis Date   • Age-related nuclear cataract of both eyes 6/30/2015   • Amblyopia-left ey intact. Neck Exam Normal Inspection - Normal. Palpation - Normal. Parotid gland - Normal. Thyroid gland - Normal.   Psychiatric Normal Orientation - Oriented to time, place, person & situation. Appropriate mood and affect.    Lymph Detail Normal Submental

## 2019-12-09 NOTE — TELEPHONE ENCOUNTER
Action Requested: Summary for Provider     []  Critical Lab, Recommendations Needed  [] Need Additional Advice  []   FYI    []   Need Orders  [] Need Medications Sent to Pharmacy  []  Other     SUMMARY: c/o L ear pain 7/10, decreased hearing on L side onse

## 2019-12-27 DIAGNOSIS — E11.42 CONTROLLED TYPE 2 DIABETES MELLITUS WITH DIABETIC POLYNEUROPATHY, WITHOUT LONG-TERM CURRENT USE OF INSULIN (HCC): ICD-10-CM

## 2019-12-27 RX ORDER — GLIMEPIRIDE 2 MG/1
2 TABLET ORAL
Qty: 90 TABLET | Refills: 1 | Status: SHIPPED | OUTPATIENT
Start: 2019-12-27 | End: 2020-02-25

## 2019-12-27 NOTE — TELEPHONE ENCOUNTER
Refill passed per [facility], [facility] protocol.   Diabetes Medications  Protocol Criteria:  · Appointment scheduled in the past 6 months or the next 3 months  · A1C < 7.5 in the past 6 months  · Creatinine in the past 12 months  · Creatinine result < 1.5   Rece

## 2020-02-05 ENCOUNTER — LAB ENCOUNTER (OUTPATIENT)
Dept: LAB | Age: 65
End: 2020-02-05
Attending: INTERNAL MEDICINE
Payer: COMMERCIAL

## 2020-02-05 ENCOUNTER — TELEPHONE (OUTPATIENT)
Dept: GASTROENTEROLOGY | Facility: CLINIC | Age: 65
End: 2020-02-05

## 2020-02-05 ENCOUNTER — OFFICE VISIT (OUTPATIENT)
Dept: GASTROENTEROLOGY | Facility: CLINIC | Age: 65
End: 2020-02-05
Payer: COMMERCIAL

## 2020-02-05 VITALS
HEIGHT: 66 IN | HEART RATE: 71 BPM | SYSTOLIC BLOOD PRESSURE: 133 MMHG | BODY MASS INDEX: 40.66 KG/M2 | DIASTOLIC BLOOD PRESSURE: 85 MMHG | WEIGHT: 253 LBS

## 2020-02-05 DIAGNOSIS — D64.9 ANEMIA, UNSPECIFIED TYPE: ICD-10-CM

## 2020-02-05 DIAGNOSIS — K21.9 GASTROESOPHAGEAL REFLUX DISEASE, ESOPHAGITIS PRESENCE NOT SPECIFIED: Primary | ICD-10-CM

## 2020-02-05 DIAGNOSIS — K21.9 GASTROESOPHAGEAL REFLUX DISEASE, ESOPHAGITIS PRESENCE NOT SPECIFIED: ICD-10-CM

## 2020-02-05 DIAGNOSIS — Z12.11 COLON CANCER SCREENING: Primary | ICD-10-CM

## 2020-02-05 DIAGNOSIS — E11.42 CONTROLLED TYPE 2 DIABETES MELLITUS WITH DIABETIC POLYNEUROPATHY, WITHOUT LONG-TERM CURRENT USE OF INSULIN (HCC): ICD-10-CM

## 2020-02-05 DIAGNOSIS — Z12.11 COLON CANCER SCREENING: ICD-10-CM

## 2020-02-05 LAB
BASOPHILS # BLD AUTO: 0.03 X10(3) UL (ref 0–0.2)
BASOPHILS NFR BLD AUTO: 0.4 %
DEPRECATED RDW RBC AUTO: 46.4 FL (ref 35.1–46.3)
EOSINOPHIL # BLD AUTO: 0.19 X10(3) UL (ref 0–0.7)
EOSINOPHIL NFR BLD AUTO: 2.6 %
ERYTHROCYTE [DISTWIDTH] IN BLOOD BY AUTOMATED COUNT: 14.2 % (ref 11–15)
EST. AVERAGE GLUCOSE BLD GHB EST-MCNC: 171 MG/DL (ref 68–126)
HBA1C MFR BLD HPLC: 7.6 % (ref ?–5.7)
HCT VFR BLD AUTO: 38.9 % (ref 39–53)
HGB BLD-MCNC: 12.8 G/DL (ref 13–17.5)
IMM GRANULOCYTES # BLD AUTO: 0.01 X10(3) UL (ref 0–1)
IMM GRANULOCYTES NFR BLD: 0.1 %
IRON SATURATION: 20 % (ref 20–50)
IRON SERPL-MCNC: 96 UG/DL (ref 65–175)
LYMPHOCYTES # BLD AUTO: 2.44 X10(3) UL (ref 1–4)
LYMPHOCYTES NFR BLD AUTO: 33.3 %
MCH RBC QN AUTO: 29.3 PG (ref 26–34)
MCHC RBC AUTO-ENTMCNC: 32.9 G/DL (ref 31–37)
MCV RBC AUTO: 89 FL (ref 80–100)
MONOCYTES # BLD AUTO: 0.65 X10(3) UL (ref 0.1–1)
MONOCYTES NFR BLD AUTO: 8.9 %
NEUTROPHILS # BLD AUTO: 4.01 X10 (3) UL (ref 1.5–7.7)
NEUTROPHILS # BLD AUTO: 4.01 X10(3) UL (ref 1.5–7.7)
NEUTROPHILS NFR BLD AUTO: 54.7 %
PLATELET # BLD AUTO: 301 10(3)UL (ref 150–450)
RBC # BLD AUTO: 4.37 X10(6)UL (ref 3.8–5.8)
TOTAL IRON BINDING CAPACITY: 475 UG/DL (ref 240–450)
TRANSFERRIN SERPL-MCNC: 319 MG/DL (ref 200–360)
WBC # BLD AUTO: 7.3 X10(3) UL (ref 4–11)

## 2020-02-05 PROCEDURE — 84466 ASSAY OF TRANSFERRIN: CPT

## 2020-02-05 PROCEDURE — 83540 ASSAY OF IRON: CPT

## 2020-02-05 PROCEDURE — 99243 OFF/OP CNSLTJ NEW/EST LOW 30: CPT | Performed by: INTERNAL MEDICINE

## 2020-02-05 PROCEDURE — 85025 COMPLETE CBC W/AUTO DIFF WBC: CPT

## 2020-02-05 PROCEDURE — 36415 COLL VENOUS BLD VENIPUNCTURE: CPT

## 2020-02-05 PROCEDURE — 83036 HEMOGLOBIN GLYCOSYLATED A1C: CPT

## 2020-02-05 NOTE — PROGRESS NOTES
Cherelle Bradford is a 71year old male. HPI:   Patient presents with:  Anemia    The patient is a 56-year-old male who has a history of back problems who has been on Aleve for pain control who presents with borderline anemia and low iron stores.   He cinthia standard drinks      Comment: very rare    Drug use: No       Medications (Active prior to today's visit):  Current Outpatient Medications   Medication Sig Dispense Refill   • PEG 3350-KCl-NaBcb-NaCl-NaSulf (COLYTE WITH FLAVOR PACKS) 240 g Oral Recon Soln ER, XL, 150 MG Oral Tablet 24 Hr Take 1 tablet (150 mg total) by mouth daily. 90 tablet 1       Allergies:  No Known Allergies      ROS:   The patient denies any chest pain or shortness of breath,  No neurologic or dermatologic symptoms.      PHYSICAL EXAM: Referrals:  None       Stefany Wagner MD  [facility], [facility]     2/5/2020  Ez Elias.  Marlen Hatch MD

## 2020-02-05 NOTE — TELEPHONE ENCOUNTER
GI RNs - Please advise on DM orders, patient has procedure 4/22/2020  Dr Omar Kennedy MD.    Briseyda Bassett you

## 2020-02-05 NOTE — TELEPHONE ENCOUNTER
Scheduled for:  [id number] EGD [id number]   Provider Name: Dr Karen Morgan  Date:  04/22/2020  Location:  [facility]  Sedation:  MAC  Time:  9416 (pt is aware to arrive at 11:30Aam)    Prep: COLYTE  Meds/Allergies Reconciled?:  Physician reviewed    Diagnosis with codes:  AN

## 2020-02-09 DIAGNOSIS — L28.0 LICHEN SIMPLEX CHRONICUS: ICD-10-CM

## 2020-02-09 RX ORDER — CLOBETASOL PROPIONATE 0.5 MG/G
CREAM TOPICAL
Qty: 45 G | Refills: 3 | Status: SHIPPED | OUTPATIENT
Start: 2020-02-09 | End: 2020-09-01

## 2020-02-09 NOTE — TELEPHONE ENCOUNTER
Review pended refill request as it does not fall under a protocol.     Last Rx: 12/2/19  LOV: 2 months ago

## 2020-02-11 RX ORDER — LATANOPROST 50 UG/ML
SOLUTION/ DROPS OPHTHALMIC
Qty: 3 BOTTLE | Refills: 3 | Status: SHIPPED | OUTPATIENT
Start: 2020-02-11 | End: 2020-05-07

## 2020-02-18 ENCOUNTER — OFFICE VISIT (OUTPATIENT)
Dept: OPHTHALMOLOGY | Facility: CLINIC | Age: 65
End: 2020-02-18
Payer: COMMERCIAL

## 2020-02-18 DIAGNOSIS — H53.002 AMBLYOPIA, LEFT: ICD-10-CM

## 2020-02-18 DIAGNOSIS — H52.13 HIGH MYOPIA, BILATERAL: ICD-10-CM

## 2020-02-18 DIAGNOSIS — H40.1132 PRIMARY OPEN ANGLE GLAUCOMA (POAG) OF BOTH EYES, MODERATE STAGE: Primary | ICD-10-CM

## 2020-02-18 PROCEDURE — 99213 OFFICE O/P EST LOW 20 MIN: CPT | Performed by: OPHTHALMOLOGY

## 2020-02-18 NOTE — PROGRESS NOTES
Stephanie Hartmann is a 71year old male. HPI:     HPI     Patient is here for an IOP check. He is taking Latanoprost qhs OU as directed. Patient denies any problems or changes with his eyes.      Last edited by Rc Steve on 2/18/2020  8:00 AM. (Histor Currently      Alcohol/week: 0.0 standard drinks      Comment: very rare    Drug use: No      Medications:  Current Outpatient Medications   Medication Sig Dispense Refill   • latanoprost 0.005 % Ophthalmic Solution Instill 1 drop by ophthalmic route every Linear)       Right Left    Dist cc 20/40 -2 20/60 -2    Dist ph cc 20/30 20/40 -2          Tonometry (Applanation, 8:05 AM)       Right Left    Pressure 17 17            Slit Lamp and Fundus Exam     External Exam       Right Left    External Normal Persia Antis eye exam.    2/18/2020  Scribed by: Mely Baeza MD

## 2020-02-18 NOTE — ASSESSMENT & PLAN NOTE
Intraocular pressure stable, tolerating medications. Will continue same regimen of Latanoprost at bedtime in both eyes.   Will have patient back in 4 months for a visual field, optic nerve scan, refraction and dilated, diabetic eye exam.

## 2020-02-18 NOTE — PATIENT INSTRUCTIONS
Primary open angle glaucoma (POAG) of both eyes, moderate stage   Intraocular pressure stable, tolerating medications. Will continue same regimen of Latanoprost at bedtime in both eyes.   Will have patient back in 4 months for a visual field, optic nerve s

## 2020-02-25 ENCOUNTER — TELEPHONE (OUTPATIENT)
Dept: NEUROLOGY | Facility: CLINIC | Age: 65
End: 2020-02-25

## 2020-02-25 ENCOUNTER — OFFICE VISIT (OUTPATIENT)
Dept: INTERNAL MEDICINE CLINIC | Facility: CLINIC | Age: 65
End: 2020-02-25
Payer: COMMERCIAL

## 2020-02-25 VITALS
SYSTOLIC BLOOD PRESSURE: 126 MMHG | DIASTOLIC BLOOD PRESSURE: 62 MMHG | HEIGHT: 66 IN | WEIGHT: 258.63 LBS | HEART RATE: 66 BPM | BODY MASS INDEX: 41.56 KG/M2

## 2020-02-25 DIAGNOSIS — E11.65 UNCONTROLLED TYPE 2 DIABETES MELLITUS WITH HYPERGLYCEMIA, WITHOUT LONG-TERM CURRENT USE OF INSULIN (HCC): Primary | ICD-10-CM

## 2020-02-25 DIAGNOSIS — D64.9 ANEMIA, UNSPECIFIED TYPE: ICD-10-CM

## 2020-02-25 DIAGNOSIS — M48.061 LUMBAR FORAMINAL STENOSIS: ICD-10-CM

## 2020-02-25 PROCEDURE — 99214 OFFICE O/P EST MOD 30 MIN: CPT | Performed by: INTERNAL MEDICINE

## 2020-02-25 RX ORDER — GLIMEPIRIDE 4 MG/1
4 TABLET ORAL
Qty: 90 TABLET | Refills: 1 | Status: SHIPPED | OUTPATIENT
Start: 2020-02-25 | End: 2020-08-17

## 2020-02-25 NOTE — PATIENT INSTRUCTIONS
Do non-fasting labs 2-3 days prior to the next appointment. Please schedule that appointment for May.

## 2020-02-25 NOTE — ASSESSMENT & PLAN NOTE
Note for work. He cannot stand for more than 15 min without stopping and sitting. Refer to Dr. Alexis Sifuentes.

## 2020-02-25 NOTE — PROGRESS NOTES
HPI:    Patient ID: Tyree Friday is a 71year old male. His A1C went up. He stopped exercising because his back has been very bad. His left leg goes numb when he stands. He is scheduled for a colonoscopy.       Diabetes   He presents for his follow METFORMIN HCL  MG Oral Tablet 24 Hr TAKE 4 TABLETS DAILY WITH BREAKFAST 360 tablet 1   • losartan 100 MG Oral Tab Take 1 tablet (100 mg total) by mouth daily.  90 tablet 1   • hydrochlorothiazide 25 MG Oral Tab Take 1 tablet (25 mg total) by mouth charito hyperlipidemia   • Glaucoma Neg    • Macular degeneration Neg        ./62 (BP Location: Left arm, Patient Position: Sitting, Cuff Size: large)   Pulse 66   Ht 5' 6\" (1.676 m)   Wt 258 lb 9.6 oz (117.3 kg)   BMI 41.74 kg/m²   PHYSICAL EXAM:   [facility] Prescriptions     Signed Prescriptions Disp Refills   • glimepiride 4 MG Oral Tab 90 tablet 1     Sig: Take 1 tablet (4 mg total) by mouth daily with breakfast.   • Dapagliflozin Propanediol (FARXIGA) 5 MG Oral Tab 30 tablet 2     Sig: Take 5 mg by mouth d

## 2020-02-25 NOTE — ASSESSMENT & PLAN NOTE
Counseled pt that uncontrolled diabetes can cause severe long term consequences, including cardiovascular disease, kidney problems, vision loss, and loss of limb. Advised pt the importance of diet and exercise. Patient's A1c was 7.6.   He does not like ta

## 2020-02-25 NOTE — TELEPHONE ENCOUNTER
Pt is requesting to transfer care to Dr. Sarita Cogan, did not give reason as to why.  Please advise if switch is ok

## 2020-03-03 ENCOUNTER — OFFICE VISIT (OUTPATIENT)
Dept: NEUROLOGY | Facility: CLINIC | Age: 65
End: 2020-03-03
Payer: COMMERCIAL

## 2020-03-03 ENCOUNTER — MED REC SCAN ONLY (OUTPATIENT)
Dept: NEUROLOGY | Facility: CLINIC | Age: 65
End: 2020-03-03

## 2020-03-03 VITALS
HEART RATE: 80 BPM | DIASTOLIC BLOOD PRESSURE: 80 MMHG | SYSTOLIC BLOOD PRESSURE: 148 MMHG | RESPIRATION RATE: 16 BRPM | WEIGHT: 258 LBS | HEIGHT: 66 IN | BODY MASS INDEX: 41.46 KG/M2

## 2020-03-03 DIAGNOSIS — R12 HEARTBURN: ICD-10-CM

## 2020-03-03 DIAGNOSIS — I25.10 ATHEROSCLEROSIS OF NATIVE CORONARY ARTERY OF NATIVE HEART WITHOUT ANGINA PECTORIS: ICD-10-CM

## 2020-03-03 DIAGNOSIS — G57.12 MERALGIA PARESTHETICA OF LEFT SIDE: Primary | ICD-10-CM

## 2020-03-03 DIAGNOSIS — E11.42 CONTROLLED TYPE 2 DIABETES MELLITUS WITH DIABETIC POLYNEUROPATHY, WITHOUT LONG-TERM CURRENT USE OF INSULIN (HCC): ICD-10-CM

## 2020-03-03 DIAGNOSIS — G47.00 INSOMNIA, UNSPECIFIED TYPE: ICD-10-CM

## 2020-03-03 PROCEDURE — 99215 OFFICE O/P EST HI 40 MIN: CPT | Performed by: PHYSICAL MEDICINE & REHABILITATION

## 2020-03-03 RX ORDER — NORTRIPTYLINE HYDROCHLORIDE 10 MG/1
CAPSULE ORAL
Qty: 60 CAPSULE | Refills: 0 | Status: SHIPPED | OUTPATIENT
Start: 2020-03-03 | End: 2020-03-26

## 2020-03-03 NOTE — PROGRESS NOTES
[address]  Progress Note    CHIEF COMPLAINT:  Patient presents with:  Low Back Pain: Patient presents for low back pain, has seen Zakiya Harris in the past LOV:9/4/18, here for second opinion.  Had worsened low b status: Former Smoker        Packs/day: 1.00        Years: 45.00        Pack years: 39        Types: Cigarettes        Start date: 2019        Quit date: 2019        Years since quittin.8      Smokeless tobacco: Never Used      Tobacco comment: EVERY DAY 90 tablet 1   • amLODIPine Besylate 10 MG Oral Tab Take 1 tablet (10 mg total) by mouth once daily.  90 tablet 3   • GABAPENTIN 300 MG Oral Cap TAKE 1 CAPSULE (300 MG TOTAL) BY MOUTH 3 (THREE) TIMES DAILY AS NEEDED. 270 capsule 1   • omeprazole 20 148/80 (BP Location: Right arm, Patient Position: Sitting, Cuff Size: adult)   Pulse 80   Resp 16   Ht 66\"   Wt 258 lb (117 kg)   BMI 41.64 kg/m²     Body mass index is 41.64 kg/m².       General: No immediate distress, obese abdomen  Eyes: Extra-occular m disease. Limits treatment options. 5. Insomnia, unspecified type  Nortriptyline should help    Orders Placed This Encounter      Nortriptyline HCl 10 MG Oral Cap      RTC    Return in about 4 weeks (around 3/31/2020).        Discharge Instructions wer

## 2020-03-20 ENCOUNTER — TELEPHONE (OUTPATIENT)
Dept: INTERNAL MEDICINE CLINIC | Facility: CLINIC | Age: 65
End: 2020-03-20

## 2020-03-20 DIAGNOSIS — E11.65 UNCONTROLLED TYPE 2 DIABETES MELLITUS WITH HYPERGLYCEMIA, WITHOUT LONG-TERM CURRENT USE OF INSULIN (HCC): ICD-10-CM

## 2020-03-20 DIAGNOSIS — L28.0 LICHEN SIMPLEX CHRONICUS: ICD-10-CM

## 2020-03-26 RX ORDER — NORTRIPTYLINE HYDROCHLORIDE 10 MG/1
10 CAPSULE ORAL NIGHTLY
Qty: 30 CAPSULE | Refills: 2 | Status: SHIPPED | OUTPATIENT
Start: 2020-03-26 | End: 2020-06-22

## 2020-03-26 RX ORDER — LOSARTAN POTASSIUM 100 MG/1
TABLET ORAL
Qty: 90 TABLET | Refills: 1 | Status: SHIPPED | OUTPATIENT
Start: 2020-03-26 | End: 2020-09-18

## 2020-03-26 NOTE — TELEPHONE ENCOUNTER
I gave him 3 months of refills. He should see me in 3 months for an in-person follow up given the coronavirus situation. Thanks. Carol Herzog

## 2020-03-26 NOTE — TELEPHONE ENCOUNTER
Medication request: Nortriptyline 10 mg, Take 1-2 capsules by mouth nightly. Qt 60 Refills 0    LOV:3/3/20  NOV:None   Cancelled 3/31/20    Last refill:3/3/20

## 2020-03-26 NOTE — TELEPHONE ENCOUNTER
Spoke with patient, states he is taking nortriptyline 10 mg, taking 1 tablet nightly. Requesting a refill.

## 2020-03-26 NOTE — TELEPHONE ENCOUNTER
Please ask him if he is taking the nortriptyline and if it is working. If he is taking nortriptyline, is he taking 1 or 2 tablets every night? Depending on the answer, I will prescribe a refill or not. Thanks. Juan Carlos Yu

## 2020-03-27 RX ORDER — HYDROCHLOROTHIAZIDE 25 MG/1
TABLET ORAL
Qty: 90 TABLET | Refills: 1 | Status: SHIPPED | OUTPATIENT
Start: 2020-03-27 | End: 2020-09-18

## 2020-03-29 DIAGNOSIS — E11.42 CONTROLLED TYPE 2 DIABETES MELLITUS WITH DIABETIC POLYNEUROPATHY, WITHOUT LONG-TERM CURRENT USE OF INSULIN (HCC): ICD-10-CM

## 2020-03-29 RX ORDER — METFORMIN HYDROCHLORIDE 500 MG/1
TABLET, EXTENDED RELEASE ORAL
Qty: 360 TABLET | Refills: 1 | Status: SHIPPED | OUTPATIENT
Start: 2020-03-29 | End: 2020-09-30

## 2020-03-31 ENCOUNTER — TELEPHONE (OUTPATIENT)
Dept: GASTROENTEROLOGY | Facility: CLINIC | Age: 65
End: 2020-03-31

## 2020-03-31 NOTE — TELEPHONE ENCOUNTER
Pt should take all of his medications except glimepiride on the day before surgery. On the day of surgery, he should only take his amlodipine and losartan. Resume all medications the day after surgery.   Dr. Shanna Conti might want him to stop the aspirin and na

## 2020-03-31 NOTE — TELEPHONE ENCOUNTER
Scheduled for:  [id number] EGD [id number]  Provider Name: Dr Janae Andrade  Date:  04/22/2020  Location:  [facility]  Pt insurance contacted:  Josselyn Young [phone]                                                                Rep. Contacted:  Intake Rep Joseline MARRERO     Dx: Collin Swain

## 2020-03-31 NOTE — TELEPHONE ENCOUNTER
Dr. Stephanie Duron    Patient is scheduled for endoscopic procedure on 4/22/2020 with Dr. Bisi Sams.   Please advise on all diabetic (oral and insulin) adjustment orders based on the following diet modifications prior to procedure:    Day before procedure will be on a

## 2020-04-01 NOTE — TELEPHONE ENCOUNTER
I spoke with patient and reviewed medication orders from Dr. Raymond Mathew below (see telephone encounter from 3/31/2020). Patient voiced full understanding.   However, per anesthesia protocol patient will need to hold losartan day of procedure and patient was i

## 2020-04-01 NOTE — TELEPHONE ENCOUNTER
Left message for patient to call back to review medication orders prior to procedure scheduled 4/22/2020.

## 2020-04-06 DIAGNOSIS — E11.42 CONTROLLED TYPE 2 DIABETES MELLITUS WITH DIABETIC POLYNEUROPATHY, WITHOUT LONG-TERM CURRENT USE OF INSULIN (HCC): ICD-10-CM

## 2020-04-06 RX ORDER — GABAPENTIN 300 MG/1
300 CAPSULE ORAL 3 TIMES DAILY PRN
Qty: 270 CAPSULE | Refills: 1 | Status: SHIPPED | OUTPATIENT
Start: 2020-04-06 | End: 2020-07-13

## 2020-04-17 ENCOUNTER — TELEPHONE (OUTPATIENT)
Dept: NEUROLOGY | Facility: CLINIC | Age: 65
End: 2020-04-17

## 2020-04-21 ENCOUNTER — TELEPHONE (OUTPATIENT)
Dept: GASTROENTEROLOGY | Facility: CLINIC | Age: 65
End: 2020-04-21

## 2020-04-21 DIAGNOSIS — K21.9 GASTROESOPHAGEAL REFLUX DISEASE, ESOPHAGITIS PRESENCE NOT SPECIFIED: ICD-10-CM

## 2020-04-21 DIAGNOSIS — Z12.11 COLON CANCER SCREENING: Primary | ICD-10-CM

## 2020-04-21 DIAGNOSIS — D64.9 ANEMIA, UNSPECIFIED TYPE: ICD-10-CM

## 2020-04-21 NOTE — TELEPHONE ENCOUNTER
CBLM to reschedule procedure. Please transfer to Damion Cordova or Tamara Wyatt at ext [phone] for scheduling.

## 2020-04-23 NOTE — TELEPHONE ENCOUNTER
Scheduled for:  Colonoscopy 49533 and EGD [address]  Provider Name:  Dr. Vi Vela  Date:  7/10/20  Location:    [facility]  Sedation:  MAC  Time:  0007 (pt is aware to arrive at 0945)   Prep:  Colyte, sent new instructions via [facility]  Meds/Allergies Reconciled?:  Physi

## 2020-05-02 DIAGNOSIS — E78.00 HYPERCHOLESTEROLEMIA: ICD-10-CM

## 2020-05-02 RX ORDER — PRAVASTATIN SODIUM 10 MG
TABLET ORAL
Qty: 90 TABLET | Refills: 1 | Status: SHIPPED | OUTPATIENT
Start: 2020-05-02 | End: 2020-11-27

## 2020-05-02 NOTE — TELEPHONE ENCOUNTER
Refill passed per [facility] protocol.     Cholesterol Medications  Protocol Criteria:  · Appointment scheduled in the past 12 months or in the next 3 months  · ALT & LDL on file in the past 12 months  · ALT result < 80  · LDL result <130   Recent Outp

## 2020-05-07 DIAGNOSIS — I10 ESSENTIAL HYPERTENSION: ICD-10-CM

## 2020-05-07 RX ORDER — LATANOPROST 50 UG/ML
SOLUTION/ DROPS OPHTHALMIC
Qty: 3 BOTTLE | Refills: 3 | Status: SHIPPED | OUTPATIENT
Start: 2020-05-07 | End: 2020-06-18

## 2020-05-07 RX ORDER — METOPROLOL SUCCINATE 50 MG/1
50 TABLET, EXTENDED RELEASE ORAL EVERY EVENING
Qty: 90 TABLET | Refills: 1 | OUTPATIENT
Start: 2020-05-07

## 2020-05-07 RX ORDER — METOPROLOL SUCCINATE 50 MG/1
50 TABLET, EXTENDED RELEASE ORAL EVERY EVENING
Qty: 90 TABLET | Refills: 1 | Status: SHIPPED | OUTPATIENT
Start: 2020-05-07 | End: 2020-09-08

## 2020-05-30 DIAGNOSIS — E11.65 UNCONTROLLED TYPE 2 DIABETES MELLITUS WITH HYPERGLYCEMIA, WITHOUT LONG-TERM CURRENT USE OF INSULIN (HCC): ICD-10-CM

## 2020-06-02 RX ORDER — DAPAGLIFLOZIN 5 MG/1
TABLET, FILM COATED ORAL
Qty: 90 TABLET | Refills: 0 | Status: SHIPPED | OUTPATIENT
Start: 2020-06-02 | End: 2020-08-27

## 2020-06-03 ENCOUNTER — LAB ENCOUNTER (OUTPATIENT)
Dept: LAB | Age: 65
End: 2020-06-03
Attending: INTERNAL MEDICINE
Payer: COMMERCIAL

## 2020-06-03 DIAGNOSIS — E11.65 UNCONTROLLED TYPE 2 DIABETES MELLITUS WITH HYPERGLYCEMIA, WITHOUT LONG-TERM CURRENT USE OF INSULIN (HCC): ICD-10-CM

## 2020-06-03 DIAGNOSIS — D64.9 ANEMIA, UNSPECIFIED TYPE: ICD-10-CM

## 2020-06-03 PROCEDURE — 83036 HEMOGLOBIN GLYCOSYLATED A1C: CPT

## 2020-06-03 PROCEDURE — 85025 COMPLETE CBC W/AUTO DIFF WBC: CPT

## 2020-06-03 PROCEDURE — 36415 COLL VENOUS BLD VENIPUNCTURE: CPT

## 2020-06-10 ENCOUNTER — TELEPHONE (OUTPATIENT)
Dept: GASTROENTEROLOGY | Facility: CLINIC | Age: 65
End: 2020-06-10

## 2020-06-10 ENCOUNTER — VIRTUAL PHONE E/M (OUTPATIENT)
Dept: INTERNAL MEDICINE CLINIC | Facility: CLINIC | Age: 65
End: 2020-06-10
Payer: COMMERCIAL

## 2020-06-10 VITALS — DIASTOLIC BLOOD PRESSURE: 69 MMHG | SYSTOLIC BLOOD PRESSURE: 127 MMHG

## 2020-06-10 DIAGNOSIS — R12 HEARTBURN: ICD-10-CM

## 2020-06-10 DIAGNOSIS — D50.9 IRON DEFICIENCY ANEMIA, UNSPECIFIED IRON DEFICIENCY ANEMIA TYPE: Primary | ICD-10-CM

## 2020-06-10 DIAGNOSIS — E11.42 CONTROLLED TYPE 2 DIABETES MELLITUS WITH DIABETIC POLYNEUROPATHY, WITHOUT LONG-TERM CURRENT USE OF INSULIN (HCC): ICD-10-CM

## 2020-06-10 DIAGNOSIS — I10 ESSENTIAL HYPERTENSION: ICD-10-CM

## 2020-06-10 PROBLEM — H40.003 GLAUCOMA SUSPECT OF BOTH EYES: Status: RESOLVED | Noted: 2019-06-11 | Resolved: 2020-06-10

## 2020-06-10 PROBLEM — H61.20 CERUMEN IMPACTION: Status: RESOLVED | Noted: 2019-12-09 | Resolved: 2020-06-10

## 2020-06-10 PROBLEM — H93.8X2 CLOGGED EAR, LEFT: Status: RESOLVED | Noted: 2019-12-09 | Resolved: 2020-06-10

## 2020-06-10 PROCEDURE — 99214 OFFICE O/P EST MOD 30 MIN: CPT | Performed by: INTERNAL MEDICINE

## 2020-06-10 NOTE — PROGRESS NOTES
Video Progress Note  Telehealth Verbal Consent   I conducted a telehealth visit with Deyanira whitt, 06/10/20, which was completed using two-way, real-time interactive audio and video communication.  This has been done in good kael to provide con heartburn. He is not working. Anemia   This is a new problem. The current episode started more than 1 month ago. The problem occurs constantly. The problem has been unchanged. Associated symptoms include arthralgias.  Pertinent negatives include no ches BY MOUTH EVERY DAY 90 tablet 1   • LOSARTAN 100 MG Oral Tab TAKE 1 TABLET BY MOUTH EVERY DAY 90 tablet 1   • Nortriptyline HCl 10 MG Oral Cap Take 1 capsule (10 mg total) by mouth nightly.  30 capsule 2   • glimepiride 4 MG Oral Tab Take 1 tablet (4 mg [employer]   Respiratory: Negative for cough, shortness of breath and wheezing. Cardiovascular: Negative for chest pain and palpitations. Gastrointestinal: Negative for heartburn (controlled with omeprazole).    Musculoskeletal: Positive for back pain and min.

## 2020-06-10 NOTE — TELEPHONE ENCOUNTER
----- Message from Latoya Morrison MD sent at 6/10/2020  2:52 PM CDT -----  Regarding: EGD? Hi Pat,  This mutual patient has a colonoscopy scheduled in July. He has chronic heartburn which is well controlled with omeprazole.   He has developed a very sligh

## 2020-06-10 NOTE — PATIENT INSTRUCTIONS
Take Slow Fe 1 daily. If it causes constipation, take Miralax with 4 oz water or juice. Do non-fasting labs 2-3 days prior to the next appointment. At this time, they are asking patients to schedule blood tests, rather than walk-in.   Please call c

## 2020-06-10 NOTE — ASSESSMENT & PLAN NOTE
Patient's diabetes has improved since he has been at home. His A1c was 7.0. He has an appointment with the eye doctor. Continue current medications. Next A1c in September and follow-up appointment after that.

## 2020-06-10 NOTE — ASSESSMENT & PLAN NOTE
Patient has a new mild anemia. He has a long history of heartburn which is controlled with omeprazole. He is scheduled for a colonoscopy in July and I will discuss a possible EGD with Dr. Nila White.

## 2020-06-18 ENCOUNTER — OFFICE VISIT (OUTPATIENT)
Dept: OPHTHALMOLOGY | Facility: CLINIC | Age: 65
End: 2020-06-18
Payer: COMMERCIAL

## 2020-06-18 DIAGNOSIS — H25.13 AGE-RELATED NUCLEAR CATARACT OF BOTH EYES: ICD-10-CM

## 2020-06-18 DIAGNOSIS — H53.002 AMBLYOPIA, LEFT: ICD-10-CM

## 2020-06-18 DIAGNOSIS — H43.393 FLOATER, VITREOUS, BILATERAL: ICD-10-CM

## 2020-06-18 DIAGNOSIS — E11.9 DIABETES MELLITUS TYPE 2 WITHOUT RETINOPATHY (HCC): ICD-10-CM

## 2020-06-18 DIAGNOSIS — H40.1132 PRIMARY OPEN ANGLE GLAUCOMA (POAG) OF BOTH EYES, MODERATE STAGE: Primary | ICD-10-CM

## 2020-06-18 PROCEDURE — 92014 COMPRE OPH EXAM EST PT 1/>: CPT | Performed by: OPHTHALMOLOGY

## 2020-06-18 PROCEDURE — 92015 DETERMINE REFRACTIVE STATE: CPT | Performed by: OPHTHALMOLOGY

## 2020-06-18 PROCEDURE — 92083 EXTENDED VISUAL FIELD XM: CPT | Performed by: OPHTHALMOLOGY

## 2020-06-18 PROCEDURE — 92133 CPTRZD OPH DX IMG PST SGM ON: CPT | Performed by: OPHTHALMOLOGY

## 2020-06-18 RX ORDER — LATANOPROST 50 UG/ML
SOLUTION/ DROPS OPHTHALMIC
Qty: 3 BOTTLE | Refills: 3 | Status: SHIPPED | OUTPATIENT
Start: 2020-06-18 | End: 2021-04-19

## 2020-06-18 NOTE — PROGRESS NOTES
Shakir Tajik is a 71year old male.     HPI:     HPI     Diabetic Eye Exam      Additional comments: Pt has been a diabetic for 7 years       Pt's diabetes is currently controlled by pills  Pt checks BS 2-3x a day  Pt's last blood sugar was 91  Last H Use      Smoking status: Former Smoker        Packs/day: 1.00        Years: 45.00        Pack years: 39        Types: Cigarettes        Start date: 2019        Quit date: 2019        Years since quittin.1      Smokeless tobacco: Never Used strip 11   • ACCU-CHEK FASTCLIX LANCETS Does not apply Misc To monitor blood sugar three times daily 300 each 11   • Naproxen Sodium (ALEVE) 220 MG Oral Tab Take 1-2 tablets by mouth as needed. • aspirin 81 MG Oral Tab Take 81 mg by mouth daily. Progressive bifocal          Manifest Refraction       [facility]    Right -10.00 +2.00 170 20/30 +2.50 20/20    Left -11.75 +2.00 030 20/60 +2.50 20/30          Final Rx       [facility]    Right

## 2020-06-18 NOTE — ASSESSMENT & PLAN NOTE
Visual field and OCT completed in office today with stable results that were discussed with patient in office today. Intraocular pressure stable, tolerating medications. Will continue same regimen of Latanoprost at bedtime in both eyes.   Will have pat

## 2020-06-18 NOTE — PATIENT INSTRUCTIONS
Primary open angle glaucoma (POAG) of both eyes, moderate stage  Visual field and OCT completed in office today with stable results that were discussed with patient in office today. Intraocular pressure stable, tolerating medications.   Will continue sa

## 2020-06-19 RX ORDER — BLOOD SUGAR DIAGNOSTIC
STRIP MISCELLANEOUS
Qty: 300 STRIP | Refills: 11 | Status: SHIPPED | OUTPATIENT
Start: 2020-06-19 | End: 2021-08-02

## 2020-06-22 RX ORDER — NORTRIPTYLINE HYDROCHLORIDE 10 MG/1
10 CAPSULE ORAL EVERY EVENING
Qty: 30 CAPSULE | Refills: 0 | Status: SHIPPED | OUTPATIENT
Start: 2020-06-22 | End: 2021-01-08 | Stop reason: ALTCHOICE

## 2020-06-22 NOTE — TELEPHONE ENCOUNTER
Refill request for nortriptyline 10 mg, take 1 cap nightly, #90, no refills    LOV: 3/3/20  NOV: none  Last refilled on 3/26 for 30 with 2 refills

## 2020-06-22 NOTE — TELEPHONE ENCOUNTER
I gave him 1 month of refills. He is overdue for a follow up. A virtual visit would be fine, pls schedule him. Dakota Lipoma

## 2020-06-29 DIAGNOSIS — K21.9 GASTROESOPHAGEAL REFLUX DISEASE, ESOPHAGITIS PRESENCE NOT SPECIFIED: ICD-10-CM

## 2020-06-30 RX ORDER — OMEPRAZOLE 20 MG/1
CAPSULE, DELAYED RELEASE ORAL
Qty: 90 CAPSULE | Refills: 3 | Status: SHIPPED | OUTPATIENT
Start: 2020-06-30 | End: 2021-07-13

## 2020-07-10 ENCOUNTER — ANESTHESIA EVENT (OUTPATIENT)
Dept: ENDOSCOPY | Facility: HOSPITAL | Age: 65
End: 2020-07-10
Payer: COMMERCIAL

## 2020-07-10 ENCOUNTER — ANESTHESIA (OUTPATIENT)
Dept: ENDOSCOPY | Facility: HOSPITAL | Age: 65
End: 2020-07-10
Payer: COMMERCIAL

## 2020-07-10 ENCOUNTER — HOSPITAL ENCOUNTER (OUTPATIENT)
Facility: HOSPITAL | Age: 65
Setting detail: HOSPITAL OUTPATIENT SURGERY
Discharge: HOME | End: 2020-07-10
Attending: INTERNAL MEDICINE | Admitting: INTERNAL MEDICINE
Payer: COMMERCIAL

## 2020-07-10 VITALS
WEIGHT: 250 LBS | DIASTOLIC BLOOD PRESSURE: 77 MMHG | HEIGHT: 66 IN | SYSTOLIC BLOOD PRESSURE: 117 MMHG | OXYGEN SATURATION: 100 % | BODY MASS INDEX: 40.18 KG/M2 | RESPIRATION RATE: 20 BRPM | HEART RATE: 77 BPM

## 2020-07-10 DIAGNOSIS — D64.9 ANEMIA, UNSPECIFIED TYPE: ICD-10-CM

## 2020-07-10 DIAGNOSIS — Z12.11 COLON CANCER SCREENING: ICD-10-CM

## 2020-07-10 DIAGNOSIS — K21.9 GASTROESOPHAGEAL REFLUX DISEASE, ESOPHAGITIS PRESENCE NOT SPECIFIED: ICD-10-CM

## 2020-07-10 LAB — SARS-COV-2 RNA RESP QL NAA+PROBE: NOT DETECTED

## 2020-07-10 PROCEDURE — 45380 COLONOSCOPY AND BIOPSY: CPT | Performed by: INTERNAL MEDICINE

## 2020-07-10 PROCEDURE — 0DBH8ZX EXCISION OF CECUM, VIA NATURAL OR ARTIFICIAL OPENING ENDOSCOPIC, DIAGNOSTIC: ICD-10-PCS | Performed by: INTERNAL MEDICINE

## 2020-07-10 PROCEDURE — 0DBK8ZX EXCISION OF ASCENDING COLON, VIA NATURAL OR ARTIFICIAL OPENING ENDOSCOPIC, DIAGNOSTIC: ICD-10-PCS | Performed by: INTERNAL MEDICINE

## 2020-07-10 PROCEDURE — 0DJ08ZZ INSPECTION OF UPPER INTESTINAL TRACT, VIA NATURAL OR ARTIFICIAL OPENING ENDOSCOPIC: ICD-10-PCS | Performed by: INTERNAL MEDICINE

## 2020-07-10 PROCEDURE — 43235 EGD DIAGNOSTIC BRUSH WASH: CPT | Performed by: INTERNAL MEDICINE

## 2020-07-10 RX ORDER — SODIUM CHLORIDE, SODIUM LACTATE, POTASSIUM CHLORIDE, CALCIUM CHLORIDE 600; 310; 30; 20 MG/100ML; MG/100ML; MG/100ML; MG/100ML
INJECTION, SOLUTION INTRAVENOUS CONTINUOUS
Status: DISCONTINUED | OUTPATIENT
Start: 2020-07-10 | End: 2020-07-10

## 2020-07-10 RX ORDER — GLYCOPYRROLATE 0.2 MG/ML
INJECTION, SOLUTION INTRAMUSCULAR; INTRAVENOUS AS NEEDED
Status: DISCONTINUED | OUTPATIENT
Start: 2020-07-10 | End: 2020-07-10 | Stop reason: SURG

## 2020-07-10 RX ORDER — LIDOCAINE HYDROCHLORIDE 10 MG/ML
INJECTION, SOLUTION EPIDURAL; INFILTRATION; INTRACAUDAL; PERINEURAL AS NEEDED
Status: DISCONTINUED | OUTPATIENT
Start: 2020-07-10 | End: 2020-07-10 | Stop reason: SURG

## 2020-07-10 RX ADMIN — LIDOCAINE HYDROCHLORIDE 50 MG: 10 INJECTION, SOLUTION EPIDURAL; INFILTRATION; INTRACAUDAL; PERINEURAL at 11:01:00

## 2020-07-10 RX ADMIN — SODIUM CHLORIDE, SODIUM LACTATE, POTASSIUM CHLORIDE, CALCIUM CHLORIDE: 600; 310; 30; 20 INJECTION, SOLUTION INTRAVENOUS at 11:01:00

## 2020-07-10 RX ADMIN — GLYCOPYRROLATE 0.2 MG: 0.2 INJECTION, SOLUTION INTRAMUSCULAR; INTRAVENOUS at 11:23:00

## 2020-07-10 NOTE — ANESTHESIA PREPROCEDURE EVALUATION
Anesthesia PreOp Note    HPI:     Jonel Underwood is a 71year old male who presents for preoperative consultation requested by: Yolie Edge MD    Date of Surgery: 7/10/2020    Procedure(s):  COLONOSCOPY  ESOPHAGOGASTRODUODENOSCOPY (EGD)  Indicati 05/04/2017      Diabetes mellitus type 2 without retinopathy ([facility] [address]         Date Noted: 10/27/2016      Controlled type 2 diabetes mellitus with diabetic polyneuropathy, without long-term current use of insulin [facility])         Date Noted: 07/28/2016      Age- To monitor blood sugar three times daily, Disp: 300 strip, Rfl: 11  latanoprost 0.005 % Ophthalmic Solution, Instill 1 drop by ophthalmic route every night into both eyes, Disp: 3 Bottle, Rfl: 3  FARXIGA 5 MG Oral Tab, TAKE 1 TABLET BY MOUTH EVERY DAY, Dis Onset   • Heart Disease Mother    • Diabetes Mother    • Cancer Sister         ovarian   • Ovarian Cancer Sister    • Heart Disease Brother         x2   • Cancer Brother         bladder cancer   • Other (Other) Father         killed in [address] [facility] accident Physically abused: Not on file        Forced sexual activity: Not on file    Other Topics      Concerns:         Service: Not Asked        Blood Transfusions: Not Asked        Caffeine Concern: No          coffee 2 cups        [employer] benefits, risks including possible dental damage if relevant, major complications, and any alternative forms of anesthetic management. All of the patient's questions were answered to the best of my ability.  The patient desires the anesthetic management a

## 2020-07-10 NOTE — OPERATIVE REPORT
[facility] Endoscopy Report      Preoperative Diagnosis:  - anemia  - GERD  - colon cancer screening      Postoperative Diagnosis:  - colitis /ileitis  - internal hemorrhoids  - hiatal hernia      Procedure:    Colonoscopy   Esophagogastrod fundus, gastric body and antrum were normal.  The duodenal bulb and post bulbar regions were normal.    Estimated blood loss-insignificant    Specimens-colonic biopsies from the cecum/ascending region    Impression:  - colitis /ileitis  - internal hemorrho

## 2020-07-10 NOTE — ANESTHESIA POSTPROCEDURE EVALUATION
Patient: Edvin Saha    Procedure Summary     Date:  07/10/20 Room / Location:  [address] [place] [address] ENDOSCOPY 01 / [address] [place] [address] ENDOSCOPY    Anesthesia Start:  1100 Anesthesia Stop:      Procedures:       COLONOSCOPY (N/A )      ESOPHAGOGASTRODUODENOSCOPY (EGD) (N/A ) Diagn

## 2020-07-13 DIAGNOSIS — E11.42 CONTROLLED TYPE 2 DIABETES MELLITUS WITH DIABETIC POLYNEUROPATHY, WITHOUT LONG-TERM CURRENT USE OF INSULIN (HCC): ICD-10-CM

## 2020-07-13 RX ORDER — GABAPENTIN 300 MG/1
300 CAPSULE ORAL 3 TIMES DAILY PRN
Qty: 270 CAPSULE | Refills: 0 | Status: SHIPPED | OUTPATIENT
Start: 2020-07-13 | End: 2020-10-08

## 2020-07-14 NOTE — H&P
History & Physical Examination    Patient Name: Nya Varner  MRN: [id number]  [facility]: [id number]  YOB: 1955    Diagnosis: anemia, GERD, colon cancer screening      No medications prior to admission.     No current facility-administered med Quit date: 2019        Years since quittin.1      Smokeless tobacco: Never Used      Tobacco comment: Patient has tried to quit several time, and unsuccessful.     Alcohol use: Not Currently      Alcohol/week: 0.0 standard drinks      Comment: very

## 2020-07-16 RX ORDER — NORTRIPTYLINE HYDROCHLORIDE 10 MG/1
10 CAPSULE ORAL EVERY EVENING
Qty: 30 CAPSULE | Refills: 0 | OUTPATIENT
Start: 2020-07-16

## 2020-07-16 NOTE — TELEPHONE ENCOUNTER
Medication request: Nortriptyline 10mg Oral Cap, #30, no refills  Take 1 capsule by mouth every evening.      [place]: 6/22/20    LOV: 3/3/20  NOV: Not yet scheduled - routing to front office to schedule

## 2020-07-17 ENCOUNTER — TELEPHONE (OUTPATIENT)
Dept: GASTROENTEROLOGY | Facility: CLINIC | Age: 65
End: 2020-07-17

## 2020-07-17 NOTE — TELEPHONE ENCOUNTER
Entered into Epic:     Recall for _CLN___per _Lynch____in _10 years_____  Last done: 7-  Next SPN:6-   updated   Result letter sent through [employer].       Left message to call back

## 2020-07-17 NOTE — TELEPHONE ENCOUNTER
----- Message from Eliseo Valverde MD sent at 7/17/2020  4:02 PM CDT -----  I wanted to get back to you with your colonoscopy results. You had no colon polyps. I would advise a repeat colonoscopy in 10 years. There was some inflammation on the colon - this can be from medications ( aspirin or motrin like medications ) or other causes. The upper scope showed a hiatal hernia, this can cause some acid reflux ( heart burn symptoms ) so avoid spicy foods and no eating for 3 - 4 hours before bed.      Please follow up in the office as I would like to see if any further testing needed on the colitis ( RN to assist in setting up)

## 2020-07-20 NOTE — TELEPHONE ENCOUNTER
Dr. Stephanie Castaneda    I reviewed below result note with the patient and he voiced understanding. He admits that he takes Aleve. He accepted appointment with you on 9/1. Given detailed directions to [facility] office.     Future Appointments   Date Time Provider Ni Clifford   9/1/2020  1:00 PM David Braun MD [address]                   Thank you

## 2020-08-16 DIAGNOSIS — E11.65 UNCONTROLLED TYPE 2 DIABETES MELLITUS WITH HYPERGLYCEMIA, WITHOUT LONG-TERM CURRENT USE OF INSULIN (HCC): ICD-10-CM

## 2020-08-17 RX ORDER — GLIMEPIRIDE 4 MG/1
TABLET ORAL
Qty: 90 TABLET | Refills: 0 | Status: SHIPPED | OUTPATIENT
Start: 2020-08-17 | End: 2020-11-10

## 2020-08-27 DIAGNOSIS — E11.65 UNCONTROLLED TYPE 2 DIABETES MELLITUS WITH HYPERGLYCEMIA, WITHOUT LONG-TERM CURRENT USE OF INSULIN (HCC): ICD-10-CM

## 2020-08-27 RX ORDER — DAPAGLIFLOZIN 5 MG/1
TABLET, FILM COATED ORAL
Qty: 90 TABLET | Refills: 0 | Status: SHIPPED | OUTPATIENT
Start: 2020-08-27 | End: 2020-12-22

## 2020-09-01 ENCOUNTER — LAB ENCOUNTER (OUTPATIENT)
Dept: LAB | Age: 65
End: 2020-09-01
Attending: INTERNAL MEDICINE
Payer: COMMERCIAL

## 2020-09-01 ENCOUNTER — OFFICE VISIT (OUTPATIENT)
Dept: GASTROENTEROLOGY | Facility: CLINIC | Age: 65
End: 2020-09-01
Payer: COMMERCIAL

## 2020-09-01 VITALS
HEIGHT: 66 IN | BODY MASS INDEX: 39.53 KG/M2 | SYSTOLIC BLOOD PRESSURE: 147 MMHG | TEMPERATURE: 98 F | HEART RATE: 68 BPM | DIASTOLIC BLOOD PRESSURE: 73 MMHG | WEIGHT: 246 LBS

## 2020-09-01 DIAGNOSIS — L28.0 LICHEN SIMPLEX CHRONICUS: ICD-10-CM

## 2020-09-01 DIAGNOSIS — K52.9 COLITIS: Primary | ICD-10-CM

## 2020-09-01 DIAGNOSIS — D50.9 IRON DEFICIENCY ANEMIA, UNSPECIFIED IRON DEFICIENCY ANEMIA TYPE: ICD-10-CM

## 2020-09-01 DIAGNOSIS — E11.42 CONTROLLED TYPE 2 DIABETES MELLITUS WITH DIABETIC POLYNEUROPATHY, WITHOUT LONG-TERM CURRENT USE OF INSULIN (HCC): ICD-10-CM

## 2020-09-01 LAB
BASOPHILS # BLD AUTO: 0.02 X10(3) UL (ref 0–0.2)
BASOPHILS NFR BLD AUTO: 0.3 %
DEPRECATED RDW RBC AUTO: 50.8 FL (ref 35.1–46.3)
EOSINOPHIL # BLD AUTO: 0.17 X10(3) UL (ref 0–0.7)
EOSINOPHIL NFR BLD AUTO: 2.2 %
ERYTHROCYTE [DISTWIDTH] IN BLOOD BY AUTOMATED COUNT: 15.3 % (ref 11–15)
EST. AVERAGE GLUCOSE BLD GHB EST-MCNC: 146 MG/DL (ref 68–126)
HBA1C MFR BLD HPLC: 6.7 % (ref ?–5.7)
HCT VFR BLD AUTO: 40.1 % (ref 39–53)
HGB BLD-MCNC: 13.4 G/DL (ref 13–17.5)
IMM GRANULOCYTES # BLD AUTO: 0.02 X10(3) UL (ref 0–1)
IMM GRANULOCYTES NFR BLD: 0.3 %
LYMPHOCYTES # BLD AUTO: 2.28 X10(3) UL (ref 1–4)
LYMPHOCYTES NFR BLD AUTO: 28.9 %
MCH RBC QN AUTO: 30.2 PG (ref 26–34)
MCHC RBC AUTO-ENTMCNC: 33.4 G/DL (ref 31–37)
MCV RBC AUTO: 90.3 FL (ref 80–100)
MONOCYTES # BLD AUTO: 0.66 X10(3) UL (ref 0.1–1)
MONOCYTES NFR BLD AUTO: 8.4 %
NEUTROPHILS # BLD AUTO: 4.73 X10 (3) UL (ref 1.5–7.7)
NEUTROPHILS # BLD AUTO: 4.73 X10(3) UL (ref 1.5–7.7)
NEUTROPHILS NFR BLD AUTO: 59.9 %
PLATELET # BLD AUTO: 286 10(3)UL (ref 150–450)
RBC # BLD AUTO: 4.44 X10(6)UL (ref 3.8–5.8)
WBC # BLD AUTO: 7.9 X10(3) UL (ref 4–11)

## 2020-09-01 PROCEDURE — 85025 COMPLETE CBC W/AUTO DIFF WBC: CPT

## 2020-09-01 PROCEDURE — 3078F DIAST BP <80 MM HG: CPT | Performed by: INTERNAL MEDICINE

## 2020-09-01 PROCEDURE — 36415 COLL VENOUS BLD VENIPUNCTURE: CPT

## 2020-09-01 PROCEDURE — 99213 OFFICE O/P EST LOW 20 MIN: CPT | Performed by: INTERNAL MEDICINE

## 2020-09-01 PROCEDURE — 3008F BODY MASS INDEX DOCD: CPT | Performed by: INTERNAL MEDICINE

## 2020-09-01 PROCEDURE — 3077F SYST BP >= 140 MM HG: CPT | Performed by: INTERNAL MEDICINE

## 2020-09-01 PROCEDURE — 83036 HEMOGLOBIN GLYCOSYLATED A1C: CPT

## 2020-09-01 RX ORDER — CLOBETASOL PROPIONATE 0.5 MG/G
CREAM TOPICAL
Qty: 45 G | Refills: 0 | Status: SHIPPED | OUTPATIENT
Start: 2020-09-01 | End: 2020-10-19

## 2020-09-01 NOTE — PATIENT INSTRUCTIONS
Colitis  - likely related to NSAIDs ( Aleve) - call Dr. Gonzalez Loud about other options  - call if develop any symptoms - diarrhea /abdominal pain or rectal bleeding

## 2020-09-02 NOTE — PROGRESS NOTES
Deyanira Julio is a 76year old male. HPI:   Patient presents with: Follow - Up    The patient is a 76year old male with a history of anemia who follows up after recent colonoscopy.   The patient's colonoscopy showed colitis changes at the IC valve degeneration Neg       Social History: Social History    Tobacco Use      Smoking status: Former Smoker        Packs/day: 1.00        Years: 45.00        Pack years: 39        Types: Cigarettes        Start date: 5/4/2019        Quit date: 5/4/2019 mouth once daily. 90 tablet 3   • ACCU-CHEK FASTCLIX LANCETS Does not apply Misc To monitor blood sugar three times daily 300 each 11   • Naproxen Sodium (ALEVE) 220 MG Oral Tab Take 1-2 tablets by mouth as needed.      • aspirin 81 MG Oral Tab Take 81 mg b Referrals:  None       Joesph Cogan, MD  [facility] Gastroenterology

## 2020-09-08 ENCOUNTER — OFFICE VISIT (OUTPATIENT)
Dept: INTERNAL MEDICINE CLINIC | Facility: CLINIC | Age: 65
End: 2020-09-08
Payer: COMMERCIAL

## 2020-09-08 VITALS
SYSTOLIC BLOOD PRESSURE: 141 MMHG | HEART RATE: 74 BPM | BODY MASS INDEX: 39.84 KG/M2 | WEIGHT: 247.88 LBS | HEIGHT: 66 IN | DIASTOLIC BLOOD PRESSURE: 72 MMHG

## 2020-09-08 DIAGNOSIS — K52.9 COLITIS: ICD-10-CM

## 2020-09-08 DIAGNOSIS — Z00.00 ROUTINE HEALTH MAINTENANCE: ICD-10-CM

## 2020-09-08 DIAGNOSIS — G89.29 CHRONIC LEFT-SIDED LOW BACK PAIN WITH LEFT-SIDED SCIATICA: ICD-10-CM

## 2020-09-08 DIAGNOSIS — Z23 NEED FOR VACCINATION: ICD-10-CM

## 2020-09-08 DIAGNOSIS — M54.42 CHRONIC LEFT-SIDED LOW BACK PAIN WITH LEFT-SIDED SCIATICA: ICD-10-CM

## 2020-09-08 DIAGNOSIS — I10 ESSENTIAL HYPERTENSION: Primary | ICD-10-CM

## 2020-09-08 DIAGNOSIS — E11.42 CONTROLLED TYPE 2 DIABETES MELLITUS WITH DIABETIC POLYNEUROPATHY, WITHOUT LONG-TERM CURRENT USE OF INSULIN (HCC): ICD-10-CM

## 2020-09-08 PROBLEM — E11.65 UNCONTROLLED TYPE 2 DIABETES MELLITUS WITH HYPERGLYCEMIA, WITHOUT LONG-TERM CURRENT USE OF INSULIN (HCC): Status: RESOLVED | Noted: 2020-02-25 | Resolved: 2020-09-08

## 2020-09-08 PROBLEM — D64.9 ANEMIA: Status: RESOLVED | Noted: 2019-11-14 | Resolved: 2020-09-08

## 2020-09-08 PROCEDURE — 99214 OFFICE O/P EST MOD 30 MIN: CPT | Performed by: INTERNAL MEDICINE

## 2020-09-08 PROCEDURE — 3077F SYST BP >= 140 MM HG: CPT | Performed by: INTERNAL MEDICINE

## 2020-09-08 PROCEDURE — 3078F DIAST BP <80 MM HG: CPT | Performed by: INTERNAL MEDICINE

## 2020-09-08 PROCEDURE — 3008F BODY MASS INDEX DOCD: CPT | Performed by: INTERNAL MEDICINE

## 2020-09-08 PROCEDURE — 90471 IMMUNIZATION ADMIN: CPT | Performed by: INTERNAL MEDICINE

## 2020-09-08 PROCEDURE — 90662 IIV NO PRSV INCREASED AG IM: CPT | Performed by: INTERNAL MEDICINE

## 2020-09-08 RX ORDER — CELECOXIB 200 MG/1
200 CAPSULE ORAL 2 TIMES DAILY
Qty: 90 CAPSULE | Refills: 1 | Status: SHIPPED | OUTPATIENT
Start: 2020-09-08 | End: 2020-12-14

## 2020-09-08 RX ORDER — METOPROLOL SUCCINATE 100 MG/1
100 TABLET, EXTENDED RELEASE ORAL EVERY EVENING
COMMUNITY
Start: 2020-09-08 | End: 2020-12-14

## 2020-09-08 NOTE — PROGRESS NOTES
HPI:    Patient ID: Petros Nair is a 76year old male. Pt had a panendoscopy and it showed a hiatal hernia and colitis. The colitis was thought to be secondary to the Aleve which he takes for chronic low back pain.   He takes Aleve 2 tabs on the Propionate 0.05 % External Cream APPLY TO ANKLE TWICE A DAY 45 g 0   • FARXIGA 5 MG Oral Tab TAKE 1 TABLET BY MOUTH EVERY DAY 90 tablet 0   • GLIMEPIRIDE 4 MG Oral Tab TAKE 1 TABLET BY MOUTH DAILY WITH BREAKFAST 90 tablet 0   • gabapentin 300 MG Oral Cap T Disease Mother    • Diabetes Mother    • Cancer Sister         ovarian   • Ovarian Cancer Sister    • Heart Disease Brother         x2   • Cancer Brother         bladder cancer   • Other (Other) Father         killed in [address] [facility] accident   • Breast Cance months. Relevant Medications    Metoprolol Succinate  MG Oral Tablet 24 Hr    Controlled type 2 diabetes mellitus with diabetic polyneuropathy, without long-term current use of insulin (HCC)     Patient's A1c was much improved at 6.7.   We amanda

## 2020-09-08 NOTE — ASSESSMENT & PLAN NOTE
Patient's A1c was much improved at 6.7. We will continue his current medications. Next A1c in 3 months.

## 2020-09-08 NOTE — ASSESSMENT & PLAN NOTE
Pt developed iron deficiency anemia and had a colonoscopy which showed colitis while on Aleve. GI specialist recommended that pt should not take NSAIDs.

## 2020-09-08 NOTE — PATIENT INSTRUCTIONS
Please schedule your next appointment in December for a physical.  Do fasting labs 2-4 days before that appointment. Fast for 12 hours. Water and meds are okay.      Please schedule your blood test by calling central registration at [phone] or go t

## 2020-09-08 NOTE — ASSESSMENT & PLAN NOTE
Patient had been taking Aleve for his chronic pain. He can no longer take this due to his colitis. We will change him to Celebrex. Tylenol as needed.

## 2020-09-08 NOTE — ASSESSMENT & PLAN NOTE
This is  suboptimally controlled. I will increase his metoprolol to 100 mg daily. Follow-up in 3 months.

## 2020-09-18 RX ORDER — HYDROCHLOROTHIAZIDE 25 MG/1
TABLET ORAL
Qty: 90 TABLET | Refills: 1 | Status: SHIPPED | OUTPATIENT
Start: 2020-09-18 | End: 2021-01-07

## 2020-09-18 RX ORDER — LOSARTAN POTASSIUM 100 MG/1
TABLET ORAL
Qty: 90 TABLET | Refills: 1 | Status: SHIPPED | OUTPATIENT
Start: 2020-09-18 | End: 2020-11-27

## 2020-09-29 DIAGNOSIS — E11.42 CONTROLLED TYPE 2 DIABETES MELLITUS WITH DIABETIC POLYNEUROPATHY, WITHOUT LONG-TERM CURRENT USE OF INSULIN (HCC): ICD-10-CM

## 2020-09-30 RX ORDER — METFORMIN HYDROCHLORIDE 500 MG/1
TABLET, EXTENDED RELEASE ORAL
Qty: 360 TABLET | Refills: 0 | Status: SHIPPED | OUTPATIENT
Start: 2020-09-30 | End: 2020-12-31

## 2020-10-08 DIAGNOSIS — E11.42 CONTROLLED TYPE 2 DIABETES MELLITUS WITH DIABETIC POLYNEUROPATHY, WITHOUT LONG-TERM CURRENT USE OF INSULIN (HCC): ICD-10-CM

## 2020-10-08 RX ORDER — GABAPENTIN 300 MG/1
300 CAPSULE ORAL 3 TIMES DAILY PRN
Qty: 270 CAPSULE | Refills: 1 | Status: SHIPPED | OUTPATIENT
Start: 2020-10-08 | End: 2021-04-15

## 2020-10-19 DIAGNOSIS — L28.0 LICHEN SIMPLEX CHRONICUS: ICD-10-CM

## 2020-10-19 RX ORDER — CLOBETASOL PROPIONATE 0.5 MG/G
CREAM TOPICAL
Qty: 45 G | Refills: 0 | Status: SHIPPED | OUTPATIENT
Start: 2020-10-19 | End: 2020-11-27

## 2020-10-21 ENCOUNTER — OFFICE VISIT (OUTPATIENT)
Dept: OPHTHALMOLOGY | Facility: CLINIC | Age: 65
End: 2020-10-21
Payer: COMMERCIAL

## 2020-10-21 DIAGNOSIS — H40.1132 PRIMARY OPEN ANGLE GLAUCOMA (POAG) OF BOTH EYES, MODERATE STAGE: Primary | ICD-10-CM

## 2020-10-21 PROCEDURE — 99213 OFFICE O/P EST LOW 20 MIN: CPT | Performed by: OPHTHALMOLOGY

## 2020-10-21 NOTE — ASSESSMENT & PLAN NOTE
Intraocular pressure stable, tolerating medications. Will continue same regimen of Latanoprost at bedtime in both eyes. Will have patient back in 4 months for a pressure check.

## 2020-10-21 NOTE — PROGRESS NOTES
Chayito Denson is a 76year old male. HPI:     HPI     Patient is here for an IOP check. He is taking Latanoprost OU QHS as directed.       Last edited by Maryjo Vernon OT on 10/21/2020  8:13 AM. (History)        Patient History:  Past Medical His tried to quit several time, and unsuccessful.     Alcohol use: Not Currently      Alcohol/week: 0.0 standard drinks      Comment: very rare    Drug use: No      Medications:  Current Outpatient Medications   Medication Sig Dispense Refill   • Clobetasol Pro Visual Acuity (Snellen - Linear)       Right Left    Dist cc 20/30 -2 20/50 +2    Correction: Glasses          Tonometry (Applanation, 8:21 AM)       Right Left    Pressure 17 17          Pachymetry (6/12/2019)       Right Left    Thickness 564/-1 571/-2 by: Claudio Leo MD

## 2020-10-21 NOTE — PATIENT INSTRUCTIONS
Primary open angle glaucoma (POAG) of both eyes, moderate stage  Intraocular pressure stable, tolerating medications. Will continue same regimen of Latanoprost at bedtime in both eyes. Will have patient back in 4 months for a pressure check.

## 2020-10-23 DIAGNOSIS — I10 ESSENTIAL HYPERTENSION: ICD-10-CM

## 2020-10-23 RX ORDER — AMLODIPINE BESYLATE 10 MG/1
10 TABLET ORAL DAILY
Qty: 90 TABLET | Refills: 1 | Status: SHIPPED | OUTPATIENT
Start: 2020-10-23 | End: 2021-04-15

## 2020-11-10 DIAGNOSIS — E11.65 UNCONTROLLED TYPE 2 DIABETES MELLITUS WITH HYPERGLYCEMIA, WITHOUT LONG-TERM CURRENT USE OF INSULIN (HCC): ICD-10-CM

## 2020-11-10 RX ORDER — GLIMEPIRIDE 4 MG/1
TABLET ORAL
Qty: 90 TABLET | Refills: 0 | Status: SHIPPED | OUTPATIENT
Start: 2020-11-10 | End: 2021-03-01

## 2020-11-27 DIAGNOSIS — L28.0 LICHEN SIMPLEX CHRONICUS: ICD-10-CM

## 2020-11-27 DIAGNOSIS — E78.00 HYPERCHOLESTEROLEMIA: ICD-10-CM

## 2020-11-28 RX ORDER — LOSARTAN POTASSIUM 100 MG/1
100 TABLET ORAL DAILY
Qty: 90 TABLET | Refills: 1 | OUTPATIENT
Start: 2020-11-28

## 2020-11-28 RX ORDER — LOSARTAN POTASSIUM 100 MG/1
100 TABLET ORAL DAILY
Qty: 90 TABLET | Refills: 0 | Status: SHIPPED | OUTPATIENT
Start: 2020-11-28 | End: 2021-04-15

## 2020-11-28 RX ORDER — PRAVASTATIN SODIUM 10 MG
TABLET ORAL
Qty: 90 TABLET | Refills: 0 | Status: SHIPPED | OUTPATIENT
Start: 2020-11-28 | End: 2021-03-01

## 2020-11-28 RX ORDER — CLOBETASOL PROPIONATE 0.5 MG/G
CREAM TOPICAL
Qty: 45 G | Refills: 0 | Status: SHIPPED | OUTPATIENT
Start: 2020-11-28 | End: 2021-04-30

## 2020-12-14 DIAGNOSIS — M54.42 CHRONIC LEFT-SIDED LOW BACK PAIN WITH LEFT-SIDED SCIATICA: ICD-10-CM

## 2020-12-14 DIAGNOSIS — G89.29 CHRONIC LEFT-SIDED LOW BACK PAIN WITH LEFT-SIDED SCIATICA: ICD-10-CM

## 2020-12-14 DIAGNOSIS — I10 ESSENTIAL HYPERTENSION: ICD-10-CM

## 2020-12-14 RX ORDER — CELECOXIB 200 MG/1
200 CAPSULE ORAL 2 TIMES DAILY
Qty: 90 CAPSULE | Refills: 1 | Status: SHIPPED | OUTPATIENT
Start: 2020-12-14 | End: 2021-01-07

## 2020-12-14 RX ORDER — METOPROLOL SUCCINATE 100 MG/1
100 TABLET, EXTENDED RELEASE ORAL EVERY EVENING
Qty: 90 TABLET | Refills: 0 | Status: SHIPPED | OUTPATIENT
Start: 2020-12-14 | End: 2021-04-15

## 2020-12-22 DIAGNOSIS — E11.65 UNCONTROLLED TYPE 2 DIABETES MELLITUS WITH HYPERGLYCEMIA, WITHOUT LONG-TERM CURRENT USE OF INSULIN (HCC): ICD-10-CM

## 2020-12-22 RX ORDER — DAPAGLIFLOZIN 5 MG/1
TABLET, FILM COATED ORAL
Qty: 90 TABLET | Refills: 0 | Status: SHIPPED | OUTPATIENT
Start: 2020-12-22 | End: 2021-03-16

## 2020-12-24 ENCOUNTER — TELEMEDICINE (OUTPATIENT)
Dept: INTERNAL MEDICINE CLINIC | Facility: CLINIC | Age: 65
End: 2020-12-24
Payer: COMMERCIAL

## 2020-12-24 ENCOUNTER — TELEPHONE (OUTPATIENT)
Dept: INTERNAL MEDICINE CLINIC | Facility: CLINIC | Age: 65
End: 2020-12-24

## 2020-12-24 ENCOUNTER — HOSPITAL ENCOUNTER (OUTPATIENT)
Dept: GENERAL RADIOLOGY | Age: 65
Discharge: HOME | End: 2020-12-24
Attending: INTERNAL MEDICINE
Payer: COMMERCIAL

## 2020-12-24 DIAGNOSIS — M25.552 LEFT HIP PAIN: Primary | ICD-10-CM

## 2020-12-24 DIAGNOSIS — R10.84 GENERALIZED ABDOMINAL PAIN: ICD-10-CM

## 2020-12-24 DIAGNOSIS — M25.552 LEFT HIP PAIN: ICD-10-CM

## 2020-12-24 PROCEDURE — 73502 X-RAY EXAM HIP UNI 2-3 VIEWS: CPT | Performed by: INTERNAL MEDICINE

## 2020-12-24 PROCEDURE — 99214 OFFICE O/P EST MOD 30 MIN: CPT | Performed by: INTERNAL MEDICINE

## 2020-12-24 NOTE — ASSESSMENT & PLAN NOTE
Patient has severe left hip pain and left testicular pain for the past month which has made it difficult for him to walk. I cannot examine him by video. I recommend that he go to the emergency room, but he does not want to do this today.   Rather, he will

## 2020-12-24 NOTE — PROGRESS NOTES
Video Progress Note  Telehealth Verbal Consent   I conducted a telehealth visit with Esther Mendesd today, 12/24/20, which was completed using two-way, real-time interactive audio and video communication.  This has been done in good kael to provide con started more than 1 month ago. The problem occurs constantly. The problem has been waxing and waning. Associated symptoms include arthralgias. Pertinent negatives include no rash. The symptoms are aggravated by bending.  He has tried position changes and re AT NIGHT 90 tablet 0   • GLIMEPIRIDE 4 MG Oral Tab TAKE 1 TABLET BY MOUTH EVERY DAY WITH BREAKFAST 90 tablet 0   • amLODIPine Besylate 10 MG Oral Tab Take 1 tablet (10 mg total) by mouth daily.  90 tablet 1   • gabapentin 300 MG Oral Cap Take 1 capsule (300 hyperlipidemia   • Glaucoma Neg    • Macular degeneration Neg        Review of Systems   Genitourinary: Positive for testicular pain. Musculoskeletal: Positive for back pain, joint pain and gait problem. Skin: Negative for rash.        .There were n

## 2020-12-24 NOTE — TELEPHONE ENCOUNTER
Patient states about one month ago, pain in his genitals, using a cane to walk (thought it was initially his back), urinating frequently, thigh pain. Patient read up on his Thom Finger, and believes this is related.     Scheduled virtual appt today

## 2020-12-24 NOTE — TELEPHONE ENCOUNTER
Patient believes he may be having a side effect with the medication below for about 2 weeks now.     •  FARXIGA 5 MG Oral Tab, TAKE 1 TABLET BY MOUTH EVERY DAY, Disp: 90 tablet, Rfl: 0

## 2020-12-31 DIAGNOSIS — E11.42 CONTROLLED TYPE 2 DIABETES MELLITUS WITH DIABETIC POLYNEUROPATHY, WITHOUT LONG-TERM CURRENT USE OF INSULIN (HCC): ICD-10-CM

## 2020-12-31 RX ORDER — METFORMIN HYDROCHLORIDE 500 MG/1
TABLET, EXTENDED RELEASE ORAL
Qty: 360 TABLET | Refills: 0 | Status: SHIPPED | OUTPATIENT
Start: 2020-12-31 | End: 2021-04-15

## 2021-01-04 ENCOUNTER — LAB ENCOUNTER (OUTPATIENT)
Dept: LAB | Age: 66
End: 2021-01-04
Attending: INTERNAL MEDICINE
Payer: COMMERCIAL

## 2021-01-04 DIAGNOSIS — Z00.00 ROUTINE HEALTH MAINTENANCE: ICD-10-CM

## 2021-01-04 DIAGNOSIS — E11.42 CONTROLLED TYPE 2 DIABETES MELLITUS WITH DIABETIC POLYNEUROPATHY, WITHOUT LONG-TERM CURRENT USE OF INSULIN (HCC): ICD-10-CM

## 2021-01-04 LAB
ALBUMIN SERPL-MCNC: 4.2 G/DL (ref 3.4–5)
ALBUMIN/GLOB SERPL: 0.9 {RATIO} (ref 1–2)
ALP LIVER SERPL-CCNC: 68 U/L
ALT SERPL-CCNC: 32 U/L
ANION GAP SERPL CALC-SCNC: 4 MMOL/L (ref 0–18)
AST SERPL-CCNC: 18 U/L (ref 15–37)
BASOPHILS # BLD AUTO: 0.02 X10(3) UL (ref 0–0.2)
BASOPHILS NFR BLD AUTO: 0.3 %
BILIRUB SERPL-MCNC: 0.6 MG/DL (ref 0.1–2)
BUN BLD-MCNC: 14 MG/DL (ref 7–18)
BUN/CREAT SERPL: 10 (ref 10–20)
CALCIUM BLD-MCNC: 10 MG/DL (ref 8.5–10.1)
CHLORIDE SERPL-SCNC: 107 MMOL/L (ref 98–112)
CHOLEST SMN-MCNC: 161 MG/DL (ref ?–200)
CO2 SERPL-SCNC: 29 MMOL/L (ref 21–32)
COMPLEXED PSA SERPL-MCNC: 2.3 NG/ML (ref ?–4)
CREAT BLD-MCNC: 1.4 MG/DL
CREAT UR-SCNC: 142 MG/DL
DEPRECATED RDW RBC AUTO: 48.9 FL (ref 35.1–46.3)
EOSINOPHIL # BLD AUTO: 0.23 X10(3) UL (ref 0–0.7)
EOSINOPHIL NFR BLD AUTO: 3 %
ERYTHROCYTE [DISTWIDTH] IN BLOOD BY AUTOMATED COUNT: 14.3 % (ref 11–15)
EST. AVERAGE GLUCOSE BLD GHB EST-MCNC: 157 MG/DL (ref 68–126)
GLOBULIN PLAS-MCNC: 4.5 G/DL (ref 2.8–4.4)
GLUCOSE BLD-MCNC: 109 MG/DL (ref 70–99)
HBA1C MFR BLD HPLC: 7.1 % (ref ?–5.7)
HCT VFR BLD AUTO: 42.2 %
HDLC SERPL-MCNC: 45 MG/DL (ref 40–59)
HGB BLD-MCNC: 14 G/DL
IMM GRANULOCYTES # BLD AUTO: 0.01 X10(3) UL (ref 0–1)
IMM GRANULOCYTES NFR BLD: 0.1 %
LDLC SERPL CALC-MCNC: 92 MG/DL (ref ?–100)
LYMPHOCYTES # BLD AUTO: 2.07 X10(3) UL (ref 1–4)
LYMPHOCYTES NFR BLD AUTO: 26.8 %
M PROTEIN MFR SERPL ELPH: 8.7 G/DL (ref 6.4–8.2)
MCH RBC QN AUTO: 31.1 PG (ref 26–34)
MCHC RBC AUTO-ENTMCNC: 33.2 G/DL (ref 31–37)
MCV RBC AUTO: 93.8 FL
MICROALBUMIN UR-MCNC: 1.06 MG/DL
MICROALBUMIN/CREAT 24H UR-RTO: 7.5 UG/MG (ref ?–30)
MONOCYTES # BLD AUTO: 0.69 X10(3) UL (ref 0.1–1)
MONOCYTES NFR BLD AUTO: 8.9 %
NEUTROPHILS # BLD AUTO: 4.71 X10 (3) UL (ref 1.5–7.7)
NEUTROPHILS # BLD AUTO: 4.71 X10(3) UL (ref 1.5–7.7)
NEUTROPHILS NFR BLD AUTO: 60.9 %
NONHDLC SERPL-MCNC: 116 MG/DL (ref ?–130)
OSMOLALITY SERPL CALC.SUM OF ELEC: 291 MOSM/KG (ref 275–295)
PATIENT FASTING Y/N/NP: YES
PATIENT FASTING Y/N/NP: YES
PLATELET # BLD AUTO: 323 10(3)UL (ref 150–450)
POTASSIUM SERPL-SCNC: 4.3 MMOL/L (ref 3.5–5.1)
RBC # BLD AUTO: 4.5 X10(6)UL
SODIUM SERPL-SCNC: 140 MMOL/L (ref 136–145)
TRIGL SERPL-MCNC: 122 MG/DL (ref 30–149)
TSI SER-ACNC: 1.36 MIU/ML (ref 0.36–3.74)
VIT B12 SERPL-MCNC: 401 PG/ML (ref 193–986)
VLDLC SERPL CALC-MCNC: 24 MG/DL (ref 0–30)
WBC # BLD AUTO: 7.7 X10(3) UL (ref 4–11)

## 2021-01-04 PROCEDURE — 82570 ASSAY OF URINE CREATININE: CPT

## 2021-01-04 PROCEDURE — 83036 HEMOGLOBIN GLYCOSYLATED A1C: CPT

## 2021-01-04 PROCEDURE — 80053 COMPREHEN METABOLIC PANEL: CPT

## 2021-01-04 PROCEDURE — 36415 COLL VENOUS BLD VENIPUNCTURE: CPT

## 2021-01-04 PROCEDURE — 84443 ASSAY THYROID STIM HORMONE: CPT

## 2021-01-04 PROCEDURE — 82607 VITAMIN B-12: CPT

## 2021-01-04 PROCEDURE — 3061F NEG MICROALBUMINURIA REV: CPT | Performed by: INTERNAL MEDICINE

## 2021-01-04 PROCEDURE — 82043 UR ALBUMIN QUANTITATIVE: CPT

## 2021-01-04 PROCEDURE — 85025 COMPLETE CBC W/AUTO DIFF WBC: CPT

## 2021-01-04 PROCEDURE — 80061 LIPID PANEL: CPT

## 2021-01-07 ENCOUNTER — TELEPHONE (OUTPATIENT)
Dept: INTERNAL MEDICINE CLINIC | Facility: CLINIC | Age: 66
End: 2021-01-07

## 2021-01-07 ENCOUNTER — OFFICE VISIT (OUTPATIENT)
Dept: INTERNAL MEDICINE CLINIC | Facility: CLINIC | Age: 66
End: 2021-01-07
Payer: COMMERCIAL

## 2021-01-07 VITALS
DIASTOLIC BLOOD PRESSURE: 64 MMHG | HEIGHT: 66 IN | BODY MASS INDEX: 39.7 KG/M2 | WEIGHT: 247 LBS | HEART RATE: 41 BPM | SYSTOLIC BLOOD PRESSURE: 137 MMHG

## 2021-01-07 DIAGNOSIS — I10 ESSENTIAL HYPERTENSION: ICD-10-CM

## 2021-01-07 DIAGNOSIS — E11.42 CONTROLLED TYPE 2 DIABETES MELLITUS WITH DIABETIC POLYNEUROPATHY, WITHOUT LONG-TERM CURRENT USE OF INSULIN (HCC): ICD-10-CM

## 2021-01-07 DIAGNOSIS — M54.42 ACUTE LEFT-SIDED LOW BACK PAIN WITH LEFT-SIDED SCIATICA: Primary | ICD-10-CM

## 2021-01-07 PROCEDURE — 3008F BODY MASS INDEX DOCD: CPT | Performed by: INTERNAL MEDICINE

## 2021-01-07 PROCEDURE — 99214 OFFICE O/P EST MOD 30 MIN: CPT | Performed by: INTERNAL MEDICINE

## 2021-01-07 PROCEDURE — 3078F DIAST BP <80 MM HG: CPT | Performed by: INTERNAL MEDICINE

## 2021-01-07 PROCEDURE — 3075F SYST BP GE 130 - 139MM HG: CPT | Performed by: INTERNAL MEDICINE

## 2021-01-07 RX ORDER — CELECOXIB 200 MG/1
200 CAPSULE ORAL DAILY
Qty: 90 CAPSULE | Refills: 1 | Status: SHIPPED | OUTPATIENT
Start: 2021-01-07 | End: 2021-01-29

## 2021-01-07 RX ORDER — CYCLOBENZAPRINE HCL 10 MG
10 TABLET ORAL 3 TIMES DAILY PRN
Qty: 40 TABLET | Refills: 1 | Status: SHIPPED | OUTPATIENT
Start: 2021-01-07 | End: 2021-06-15

## 2021-01-07 NOTE — PROGRESS NOTES
HPI:    Patient ID: Lianna Madden is a 76year old male. Pt has pain in left lower back, groin, buttock, upper leg and left knee. He has severe pain just with standing and now it hurts in bed. He cannot walk. He has not been able to work.   He di evening. 90 tablet 0   • Clobetasol Propionate 0.05 % External Cream APPLY TO ANKLE TWICE A DAY 45 g 0   • losartan 100 MG Oral Tab Take 1 tablet (100 mg total) by mouth daily.  90 tablet 0   • Pravastatin Sodium 10 MG Oral Tab TAKE 1 TABLET BY MOUTH EVERY killed in [employer] accident   • Breast Cancer Cousin    • Lipids Neg         hyperlipidemia   • Glaucoma Neg    • Macular degeneration Neg        Review of Systems   Genitourinary: Negative for bladder incontinence.    Musculoskeletal: Positive for understanding and agreed with the plan. Meds This Visit:  Requested Prescriptions     Signed Prescriptions Disp Refills   • celecoxib (CELEBREX) 200 MG Oral Cap 90 capsule 1     Sig: Take 1 capsule (200 mg total) by mouth daily.    • cyclobenzaprine 10 M

## 2021-01-07 NOTE — TELEPHONE ENCOUNTER
FMLA received with incomplete [employer].  Sent pt [facility] message for Hip[employer] completion

## 2021-01-07 NOTE — ASSESSMENT & PLAN NOTE
The patient's A1c went up a little bit to 7.1. He is up-to-date on his eye exam.  Continue current medications. Follow-up in 3 months.

## 2021-01-07 NOTE — ASSESSMENT & PLAN NOTE
Pt has severe acute low back pain. He cannot tolerate NSAIDs due to GI symptoms. Continue Celebrex daily. Will add cyclobenzaprine. It is difficult for him to stand, so he cannot work. Will sign FMLA forms for him. We will refer to physiatry.

## 2021-01-08 ENCOUNTER — OFFICE VISIT (OUTPATIENT)
Dept: NEUROLOGY | Facility: CLINIC | Age: 66
End: 2021-01-08
Payer: COMMERCIAL

## 2021-01-08 VITALS — WEIGHT: 247 LBS | HEIGHT: 66 IN | BODY MASS INDEX: 39.7 KG/M2

## 2021-01-08 DIAGNOSIS — G57.12 MERALGIA PARESTHETICA OF LEFT SIDE: ICD-10-CM

## 2021-01-08 DIAGNOSIS — R12 HEARTBURN: ICD-10-CM

## 2021-01-08 DIAGNOSIS — E11.42 CONTROLLED TYPE 2 DIABETES MELLITUS WITH DIABETIC POLYNEUROPATHY, WITHOUT LONG-TERM CURRENT USE OF INSULIN (HCC): ICD-10-CM

## 2021-01-08 DIAGNOSIS — I25.10 ATHEROSCLEROSIS OF NATIVE CORONARY ARTERY OF NATIVE HEART WITHOUT ANGINA PECTORIS: ICD-10-CM

## 2021-01-08 DIAGNOSIS — M16.12 PRIMARY OSTEOARTHRITIS OF LEFT HIP: Primary | ICD-10-CM

## 2021-01-08 PROCEDURE — 99214 OFFICE O/P EST MOD 30 MIN: CPT | Performed by: PHYSICAL MEDICINE & REHABILITATION

## 2021-01-08 PROCEDURE — 3008F BODY MASS INDEX DOCD: CPT | Performed by: PHYSICAL MEDICINE & REHABILITATION

## 2021-01-08 NOTE — PROGRESS NOTES
[address]  Progress Note    CHIEF COMPLAINT:  Patient presents with:  Low Back Pain: Patient presents for low back pain.  LOV 03/03/2020 Patient states that he did not physically attend PT but did excersices Surgical History:   Procedure Laterality Date   • APPENDECTOMY  2012   • COLONOSCOPY  2003   • COLONOSCOPY  12/18/2013   • COLONOSCOPY N/A 7/10/2020    Performed by Meri Posadas MD at [address] [place] [address] ENDOSCOPY   • ELECTROCARDIOGRAM, COMPLETE  11-    scanne Clobetasol Propionate 0.05 % External Cream APPLY TO ANKLE TWICE A DAY 45 g 0   • losartan 100 MG Oral Tab Take 1 tablet (100 mg total) by mouth daily.  90 tablet 0   • Pravastatin Sodium 10 MG Oral Tab TAKE 1 TABLET BY MOUTH EVERY DAY AT NIGHT 90 tablet 0 full and painfree lumbar ROM in all directions  Hips: full and painfree ROM   Neuro:   Cognition: alert & oriented x 3, attentive, able to follow 2 step commands, comprehention intact, spontaneous speech intact  Strength: Lower extremities have 5/5 strengt 1/29/2021). Discharge Instructions were provided as documented in AVS summary. The patient was in agreement with the assessment and plan. All questions were answered. There were no barriers to learning.         Arleth Ivory MD  Physical Medicine

## 2021-01-12 ENCOUNTER — HOSPITAL ENCOUNTER (OUTPATIENT)
Dept: ULTRASOUND IMAGING | Age: 66
Discharge: HOME | End: 2021-01-12
Attending: INTERNAL MEDICINE
Payer: COMMERCIAL

## 2021-01-12 DIAGNOSIS — R10.84 GENERALIZED ABDOMINAL PAIN: ICD-10-CM

## 2021-01-12 PROCEDURE — 76700 US EXAM ABDOM COMPLETE: CPT | Performed by: INTERNAL MEDICINE

## 2021-01-13 NOTE — TELEPHONE ENCOUNTER
Dr. Angelika Carbajal,     Please sign off on form: [facility] 12/14/2020- rtw pending physical therapy  -Highlight the patient and hit \"Chart\" button.   -In Chart Review, w/in the Encounter tab - click 1 time on the Telephone call encounter for 1/7/21 Scroll down the te

## 2021-01-19 DIAGNOSIS — I10 ESSENTIAL HYPERTENSION: ICD-10-CM

## 2021-01-19 RX ORDER — METOPROLOL SUCCINATE 100 MG/1
100 TABLET, EXTENDED RELEASE ORAL EVERY EVENING
Qty: 90 TABLET | Refills: 1 | Status: SHIPPED | OUTPATIENT
Start: 2021-01-19 | End: 2021-01-29

## 2021-01-27 DIAGNOSIS — Z23 NEED FOR VACCINATION: ICD-10-CM

## 2021-01-29 ENCOUNTER — TELEPHONE (OUTPATIENT)
Dept: NEUROLOGY | Facility: CLINIC | Age: 66
End: 2021-01-29

## 2021-01-29 ENCOUNTER — OFFICE VISIT (OUTPATIENT)
Dept: NEUROLOGY | Facility: CLINIC | Age: 66
End: 2021-01-29
Payer: COMMERCIAL

## 2021-01-29 VITALS — BODY MASS INDEX: 39.7 KG/M2 | WEIGHT: 247 LBS | HEIGHT: 66 IN

## 2021-01-29 DIAGNOSIS — R12 HEARTBURN: ICD-10-CM

## 2021-01-29 DIAGNOSIS — I25.10 ATHEROSCLEROSIS OF NATIVE CORONARY ARTERY OF NATIVE HEART WITHOUT ANGINA PECTORIS: ICD-10-CM

## 2021-01-29 DIAGNOSIS — E11.42 CONTROLLED TYPE 2 DIABETES MELLITUS WITH DIABETIC POLYNEUROPATHY, WITHOUT LONG-TERM CURRENT USE OF INSULIN (HCC): ICD-10-CM

## 2021-01-29 DIAGNOSIS — M16.12 PRIMARY OSTEOARTHRITIS OF LEFT HIP: Primary | ICD-10-CM

## 2021-01-29 PROCEDURE — 3008F BODY MASS INDEX DOCD: CPT | Performed by: PHYSICAL MEDICINE & REHABILITATION

## 2021-01-29 PROCEDURE — 99214 OFFICE O/P EST MOD 30 MIN: CPT | Performed by: PHYSICAL MEDICINE & REHABILITATION

## 2021-01-29 NOTE — TELEPHONE ENCOUNTER
Contacted [employer]ad to initiate authorization for left hip injection CPT 50588+ dx:M16.12   After being on hold one hour, call was dropped.  Will try again  Status: open

## 2021-01-29 NOTE — PROGRESS NOTES
[address]  Progress Note    CHIEF COMPLAINT:  Patient presents with:  Hip Pain: Patient presents to follow up on left hip pain.  Patient is currently in physical therapy which is helping feeling 30-40% mario Smoking status: Former Smoker        Packs/day: 1.00        Years: 45.00        Pack years: 39        Types: Cigarettes        Start date: 2019        Quit date: 2019        Years since quittin.7      Smokeless tobacco: Never Used      Tobacco apply Misc To monitor blood sugar three times daily 300 each 11   • aspirin 81 MG Oral Tab Take 81 mg by mouth daily.          ALLERGIES:   No Known Allergies    REVIEW OF SYSTEMS:   Patient-reported ROS  Constitutional  Sleep Disturbance: admits   Cardiova injection does not work I will start him on Cymbalta next time. - SPECIALTY (OTHER) - EXTERNAL    2. Heartburn  Stop Celebrex. NSAIDs relatively contraindicated due to history of upper GI disease or symptoms. Limits treatment options.     3. Controlled ty

## 2021-01-30 PROBLEM — M16.12 PRIMARY OSTEOARTHRITIS OF LEFT HIP: Status: ACTIVE | Noted: 2021-01-30

## 2021-02-01 NOTE — TELEPHONE ENCOUNTER
Contacted Princess KATE dasilva [place] reference # [id number] for left hip injection CPT 66746+ (if needed [id number])    Status: Approved-No authorization required per health plan    Routing to clinical staff to schedule

## 2021-02-04 ENCOUNTER — IMMUNIZATION (OUTPATIENT)
Dept: LAB | Facility: HOSPITAL | Age: 66
End: 2021-02-04
Attending: HOSPITALIST
Payer: COMMERCIAL

## 2021-02-04 DIAGNOSIS — Z23 NEED FOR VACCINATION: Primary | ICD-10-CM

## 2021-02-04 PROCEDURE — 0011A SARSCOV2 VAC 100MCG/0.5ML IM: CPT

## 2021-02-09 NOTE — TELEPHONE ENCOUNTER
Patient is calling to check status of his injection for his left hip. According to patient he has not gotten his injection. Please call with status.

## 2021-02-10 NOTE — TELEPHONE ENCOUNTER
Patient is aware that injection is going to in [address] and that the surgery center will call him back to give him the time for when he need to arrive for the injection.

## 2021-02-10 NOTE — TELEPHONE ENCOUNTER
Jeremy Owusu MA [facility] will contact him to confirm appt. Time and provides instructions. Oneal Claude

## 2021-02-10 NOTE — TELEPHONE ENCOUNTER
Confirmed with Marciano Rivers reference # [id number] that CPT 47150 (Fluoroscopy)-82934+ are all covered as patient is getting left hip injection done at [facility].   Nonnie Decent confirmed No authorization is required for the above CPT codes

## 2021-02-10 NOTE — TELEPHONE ENCOUNTER
Patient has been scheduled for a  Left hip injection on 2/11/21 at the [facility]. Medications and allergies reviewed. Patient informed to hold aspirins, nsaids, blood thinners, multivitamins, vitamin E and fish oils 3-7 days prior to procedure.  Patient informed

## 2021-02-11 ENCOUNTER — TELEPHONE (OUTPATIENT)
Dept: NEUROLOGY | Facility: CLINIC | Age: 66
End: 2021-02-11

## 2021-02-11 ENCOUNTER — OFFICE VISIT (OUTPATIENT)
Dept: SURGERY | Facility: CLINIC | Age: 66
End: 2021-02-11

## 2021-02-11 DIAGNOSIS — M16.12 PRIMARY OSTEOARTHRITIS OF LEFT HIP: Primary | ICD-10-CM

## 2021-02-11 PROCEDURE — 77002 NEEDLE LOCALIZATION BY XRAY: CPT | Performed by: PHYSICAL MEDICINE & REHABILITATION

## 2021-02-11 PROCEDURE — 20610 DRAIN/INJ JOINT/BURSA W/O US: CPT | Performed by: PHYSICAL MEDICINE & REHABILITATION

## 2021-02-11 NOTE — PROCEDURES
Preoperative Diagnosis:  (M16.12) Primary osteoarthritis of left hip  (primary encounter diagnosis)       Postoperative Diagnosis:  (M16.12) Primary osteoarthritis of left hip  (primary encounter diagnosis)       Procedures:  Left hip injection(s) under fl

## 2021-02-24 ENCOUNTER — OFFICE VISIT (OUTPATIENT)
Dept: NEUROLOGY | Facility: CLINIC | Age: 66
End: 2021-02-24
Payer: COMMERCIAL

## 2021-02-24 ENCOUNTER — OFFICE VISIT (OUTPATIENT)
Dept: OPHTHALMOLOGY | Facility: CLINIC | Age: 66
End: 2021-02-24
Payer: COMMERCIAL

## 2021-02-24 ENCOUNTER — TELEPHONE (OUTPATIENT)
Dept: NEUROLOGY | Facility: CLINIC | Age: 66
End: 2021-02-24

## 2021-02-24 VITALS — HEIGHT: 66 IN | BODY MASS INDEX: 39.7 KG/M2 | WEIGHT: 247 LBS

## 2021-02-24 DIAGNOSIS — E11.42 CONTROLLED TYPE 2 DIABETES MELLITUS WITH DIABETIC POLYNEUROPATHY, WITHOUT LONG-TERM CURRENT USE OF INSULIN (HCC): ICD-10-CM

## 2021-02-24 DIAGNOSIS — H40.1132 PRIMARY OPEN ANGLE GLAUCOMA (POAG) OF BOTH EYES, MODERATE STAGE: Primary | ICD-10-CM

## 2021-02-24 DIAGNOSIS — I25.10 ATHEROSCLEROSIS OF NATIVE CORONARY ARTERY OF NATIVE HEART WITHOUT ANGINA PECTORIS: ICD-10-CM

## 2021-02-24 DIAGNOSIS — M16.12 PRIMARY OSTEOARTHRITIS OF LEFT HIP: Primary | ICD-10-CM

## 2021-02-24 DIAGNOSIS — R12 HEARTBURN: ICD-10-CM

## 2021-02-24 PROCEDURE — 99214 OFFICE O/P EST MOD 30 MIN: CPT | Performed by: PHYSICAL MEDICINE & REHABILITATION

## 2021-02-24 PROCEDURE — 99213 OFFICE O/P EST LOW 20 MIN: CPT | Performed by: OPHTHALMOLOGY

## 2021-02-24 PROCEDURE — 3008F BODY MASS INDEX DOCD: CPT | Performed by: PHYSICAL MEDICINE & REHABILITATION

## 2021-02-24 RX ORDER — DULOXETIN HYDROCHLORIDE 30 MG/1
30 CAPSULE, DELAYED RELEASE ORAL DAILY
Qty: 30 CAPSULE | Refills: 0 | Status: SHIPPED | OUTPATIENT
Start: 2021-02-24 | End: 2021-04-13

## 2021-02-24 NOTE — PROGRESS NOTES
Jen Stephens is a 76year old male. HPI:     HPI     Patient is here for an IOP check. He is taking Latanoprost OU at bedtime as directed.       Last edited by Kailee Herrera OT on 2/24/2021  8:46 AM. (History)        Patient History:  Past Medic has tried to quit several time, and unsuccessful.     Alcohol use: Not Currently      Alcohol/week: 0.0 standard drinks      Comment: very rare    Drug use: No      Medications:  Current Outpatient Medications   Medication Sig Dispense Refill   • cyclobenza Right Left    Pressure 17 18          Pachymetry (6/12/2019)       Right Left    Thickness 564/-1 571/-2          Pupils       Pupils    Right PERRL    Left PERRL          Neuro/Psych     Oriented x3:  Yes            Slit Lamp and Fundus Exam     External

## 2021-02-24 NOTE — TELEPHONE ENCOUNTER
Left hip injection CPT CODE: 82473, , (If needed 31214)-APPROVED   Called [employer]ad  for authorization of approval for above. Spoke to Jamilah  who states no authorization is required. Reference call# [id number].    Will inform MARY Approved Referral.

## 2021-02-24 NOTE — PATIENT INSTRUCTIONS
Primary open angle glaucoma (POAG) of both eyes, moderate stage  Intraocular pressure stable, tolerating medications. Will continue same regimen of Latanoprost at bedtime in both eyes. Will have patient back in 4 months for a VF, OCT and IOP check.

## 2021-02-24 NOTE — PROGRESS NOTES
[address]  Progress Note    CHIEF COMPLAINT:  Patient presents with:  Hip Pain: Present for f/u after injection 2/11/21. 80-85% better. at times get pain, latia when in and out of car.  [facility] therapy with help History      Not on file    Tobacco Use      Smoking status: Former Smoker        Packs/day: 1.00        Years: 45.00        Pack years: 39        Types: Cigarettes        Start date: 2019        Quit date: 2019        Years since quittin.8 • latanoprost 0.005 % Ophthalmic Solution Instill 1 drop by ophthalmic route every night into both eyes 3 Bottle 3   • ACCU-CHEK FASTCLIX LANCETS Does not apply Misc To monitor blood sugar three times daily 300 each 11   • aspirin 81 MG Oral Tab Take 81 hyperglycemia. 4. Atherosclerosis of native coronary artery of native heart without angina pectoris  NSAIDs contraindicated due to coronary artery disease. Limits treatment options.     Orders Placed This Encounter      DULoxetine HCl (CYMBALTA) 30 MG O

## 2021-02-24 NOTE — ASSESSMENT & PLAN NOTE
Intraocular pressure stable, tolerating medications. Will continue same regimen of Latanoprost at bedtime in both eyes. Will have patient back in 4 months for a VF, OCT and IOP check.

## 2021-02-26 NOTE — TELEPHONE ENCOUNTER
Patient has been scheduled for a Left hip injection  on 3/25/21 at the [facility]. Medications and allergies reviewed. Patient informed to hold metformin/janumet 48 hours after the procedure, if taking. Patient informed he will need a .  Patient informed not

## 2021-03-01 DIAGNOSIS — E78.00 HYPERCHOLESTEROLEMIA: ICD-10-CM

## 2021-03-01 DIAGNOSIS — E11.65 UNCONTROLLED TYPE 2 DIABETES MELLITUS WITH HYPERGLYCEMIA, WITHOUT LONG-TERM CURRENT USE OF INSULIN (HCC): ICD-10-CM

## 2021-03-01 RX ORDER — GLIMEPIRIDE 4 MG/1
TABLET ORAL
Qty: 90 TABLET | Refills: 0 | Status: SHIPPED | OUTPATIENT
Start: 2021-03-01 | End: 2021-05-25

## 2021-03-01 RX ORDER — PRAVASTATIN SODIUM 10 MG
TABLET ORAL
Qty: 90 TABLET | Refills: 0 | Status: SHIPPED | OUTPATIENT
Start: 2021-03-01 | End: 2021-05-25

## 2021-03-05 ENCOUNTER — IMMUNIZATION (OUTPATIENT)
Dept: LAB | Facility: HOSPITAL | Age: 66
End: 2021-03-05
Attending: EMERGENCY MEDICINE
Payer: COMMERCIAL

## 2021-03-05 DIAGNOSIS — Z23 NEED FOR VACCINATION: Primary | ICD-10-CM

## 2021-03-05 PROCEDURE — 0012A SARSCOV2 VAC 100MCG/0.5ML IM: CPT

## 2021-03-06 NOTE — ASSESSMENT & PLAN NOTE
Ortho Consult Note          Consults    Chief Complaint  Left distal biceps rupture      History Of Present Illness  Kevin is a 48 year old male presenting with pain in his left arm.  Patient was found to have a left distal biceps rupture.  Due to the weakness and dysfunction, patient wanted to proceed with a surgical intervention with a left distal biceps repair.  He presented to [facility] today for the above procedure..    Past Medical History  Past Medical History:   Diagnosis Date   • Diverticulitis    • Inflammatory bowel disease         Surgical History  Past Surgical History:   Procedure Laterality Date   • Hand surgery     • Tendon repair Left         Social History  Social History     Tobacco Use   • Smoking status: Light Tobacco Smoker     Packs/day: 0.25   • Smokeless tobacco: Never Used   Substance Use Topics   • Drug use: Never       Family History  Family History   Problem Relation Age of Onset   • COPD Maternal Grandmother         Allergies  ALLERGIES:  Hydrocodone-apap, Naproxen, Tramadol, and Triderm    Medications  No medications prior to admission.       Review of Systems  Review of Systems     Last Recorded Vitals  Blood pressure 123/66, pulse 68, temperature 97.2 °F (36.2 °C), temperature source Temporal, resp. rate 15, height 5' 7\" (1.702 m), weight 86.2 kg (190 lb), SpO2 100 %.    Physical examination  Examination of the left arm reveals full motion with flexion extension.  He has a positive hook sign.  He has weakness with supination.  He is neurovascular intact.  His compartments are soft.    LABORATORY DATA:    No visits with results within 1 Day(s) from this visit.   Latest known visit with results is:   Lab Services on 02/28/2021   Component Date Value Ref Range Status   • SARS-CoV-2 by PCR 02/28/2021 Not Detected  Not Detected / Detected / Inhibitor Present Final    Pooled sample   • Isolation Guidelines 02/28/2021    Final                    Value:This result contains rich  "    11/19/2020         RE: Jaqueline Canales  [address]  [facility] [address]        Dear Colleague,    Thank you for referring your patient, Jaqueline Canales, to the [facility]. Please see a copy of my visit note below.    Jaqueline Canales is a 79 year old female who is being evaluated via a billable telephone visit.      The patient has been notified of following:     \"This telephone visit will be conducted via a call between you and your physician/provider. We have found that certain health care needs can be provided without the need for a physical exam.  This service lets us provide the care you need with a short phone conversation.  If a prescription is necessary we can send it directly to your pharmacy.  If lab work is needed we can place an order for that and you can then stop by our lab to have the test done at a later time.    Telephone visits are billed at different rates depending on your insurance coverage. During this emergency period, for some insurers they may be billed the same as an in-person visit.  Please reach out to your insurance provider with any questions.    If during the course of the call the physician/provider feels a telephone visit is not appropriate, you will not be charged for this service.\"    Patient has given verbal consent for Telephone visit?  Yes    What phone number would you like to be contacted at? [phone]    How would you like to obtain your AVS? Mail a copy    Phone call duration: 5 minutes    NO REFILLS, ARTHRITIS PAIN (WHICH IS CHRONIC)    Nu Patterson CMA        Visit Date:   11/19/2020     ONCOLOGY HISTORY:  Ms. Canales is a female with squamous cell carcinoma of left lung. Clinical stage 1 (T1a N0 M0).  1.  CT chest on 11/20/2015 revealed 1.2 cm left lower lobe lung nodule.   2.  CT-guided biopsy of left lung nodule on 12/04/2015 revealed poorly differentiated squamous cell carcinoma.   3.  PET scan on 12/14/2015 revealed " Controlled. Continue present management. hypermetabolic nodular lesion in left lower lobe.  No evidence of metastatic disease.   4. Brain MRI on 2015 did not reveal any brain metastasis.   5. Patient was treated with SBRT. 5000 cGy in 5 fractions between 2016 and 2016.      SUBJECTIVE:  Ms. Canales is an 79-year-old female with clinical stage I squamous cell carcinoma of left lung, status post SBRT in 2016.  The patient has been doing well since then.      The patient has interstitial lung disease and follows up with pulmonologist.  The patient has some baseline shortness because of that.      No headache.  No dizziness.  No chest pain.  Occasional cough.  No nausea or vomiting.  Appetite good.  No weight loss.  No fever or chills.      CT chest was done on 2020.  No evidence of any malignancy.  There is pulmonary fibrosis.      PHYSICAL EXAMINATION:   GENERAL:  She is alert, oriented x 3.   This is a telephone visit.      ASSESSMENT:   1.  Stage I left lung squamous cell carcinoma diagnosed in .  No evidence of recurrence.   2.  Pulmonary fibrosis and rheumatoid arthritis.      PLAN:   1.  CT scan was reviewed.  She was happy to know that there is no evidence of malignancy.      We discussed regarding followup.  She wants to be monitored as she is worried about cancer coming back. We will get CT chest in 1 year and see her after that.     2.  She will continue to follow up with her pulmonologist and rheumatologist.     3.  Advised her to see a physician sooner if she has worsening cough, coughing of blood, weight loss, lump or any other concerns.         SOFÍA EBAN MD             D: 2020   T: 2020   MT: JACOBY      Name:     GINA CANALES   MRN:      [id number]        Account:      [id number]   :      1940           Visit Date:   2020      Document: [id number]      Telephone visit for 5 minutes.    This office note has been dictated.          Again, thank you for allowing me to participate in  text formatting which cannot be displayed here.   • Procedural Notes 02/28/2021    Final                    Value:This result contains rich text formatting which cannot be displayed here.          Imaging  LAST MRI:  No results found for this or any previous visit.    LAST CT:  08/14/19   CT ADVOCATE PROCEDURE  Narrative  Accession #[id number]: CTA HEAD WO/W CON, CTA NECK W CONEXAMINATION:1. Computed tomographic angiography (CTA) of the head without and with contrast2. CTA of the neck with contrastHISTORY: eval for pca stroke . vomiting.TECHNIQUE: CT of the head was performed without contrast according to standard protocol. Then CT angiography of the head and neck was obtained after the uneventful administration of 80 mL Omnipaque 350 intravenous contrast. Three dimensional postprocessing was performed by the technologist and sent to the workstation for review.FINDINGS:Non-angiographic findings:No acute intra- or extra-axial fluid collections are identified. The ventricles are of normal size, shape, and morphology. The basilar cisterns are patent. No mass effect or midline shift is seen. The gray-white matter differentiation is normal. The visualized portions of the orbits, paranasal sinuses, and mastoids appear normal. No acute fracture is identified.No soft tissue abnormalities are identified in the neck.Angiographic findings:The visualized aortic arch appears normal. The configuration of the brachiocephalic vessels is typical. The innominate artery and both subclavian arteries appear normal. The common carotid arteries appear normal. The carotid bifurcations appear normal. The cervical internal carotid arteries appear normal. The cervical vertebral arteries appear normal.The distal internal carotid arteries appear normal. The anterior and middle cerebral arteries appear normal. The distal vertebral arteries appear normal. The basilar artery and posterior cerebral arteries appear normal. No  aneurysms, vascular occlusions, or intracranial stenoses are identified.  Impression  1. No acute intracranial hemorrhage.2. No large arterial occlusions or significant stenoses identified in the head or neck. Bilateral posterior cerebral arteries are patent.-----  F I N A L  -----Transcribed By: AMARILYS 08/14/19 8:53 pmDictated By:            ALLAN CALDERON MDElectronically Reviewed and Approved By:           ALLAN CALDERON MD  08/14/19 9:01 pm     CT ADVOCATE PROCEDURE  Narrative  Accession #[id number]: CTA HEAD WO/W CON, CTA NECK W CONEXAMINATION:1. Computed tomographic angiography (CTA) of the head without and with contrast2. CTA of the neck with contrastHISTORY: eval for pca stroke . vomiting.TECHNIQUE: CT of the head was performed without contrast according to standard protocol. Then CT angiography of the head and neck was obtained after the uneventful administration of 80 mL Omnipaque 350 intravenous contrast. Three dimensional postprocessing was performed by the technologist and sent to the workstation for review.FINDINGS:Non-angiographic findings:No acute intra- or extra-axial fluid collections are identified. The ventricles are of normal size, shape, and morphology. The basilar cisterns are patent. No mass effect or midline shift is seen. The gray-white matter differentiation is normal. The visualized portions of the orbits, paranasal sinuses, and mastoids appear normal. No acute fracture is identified.No soft tissue abnormalities are identified in the neck.Angiographic findings:The visualized aortic arch appears normal. The configuration of the brachiocephalic vessels is typical. The innominate artery and both subclavian arteries appear normal. The common carotid arteries appear normal. The carotid bifurcations appear normal. The cervical internal carotid arteries appear normal. The cervical vertebral arteries appear normal.The distal internal carotid arteries appear normal. The anterior  the care of your patient.        Sincerely,        Nathen Huffman MD     and middle cerebral arteries appear normal. The distal vertebral arteries appear normal. The basilar artery and posterior cerebral arteries appear normal. No aneurysms, vascular occlusions, or intracranial stenoses are identified.  Impression  1. No acute intracranial hemorrhage.2. No large arterial occlusions or significant stenoses identified in the head or neck. Bilateral posterior cerebral arteries are patent.-----  TRINIDAD CULP L  -----Transcribed By: AMARILYS 08/14/19 8:53 pmDictated By:            ALLAN CALDERON MDElectronically Reviewed and Approved By:           ALLAN CALDERON MD  08/14/19 9:01 pm    LAST X-RAY:  02/06/21   XR ELBOW 3 VIEWS LEFT  Narrative  EXAM:  XR ELBOW 3 VIEWS LEFT  CLINICAL INDICATION:  Trauma, pain.  COMPARISON:  None  Impression  No fractures or joint effusion demonstrated.Electronically Signed by: BRENDAN LONGO M.D. Signed on: 2/6/2021 3:23 PM     01/15/20   XR ADVOCATE PROCEDURE  Narrative  Accession #[id number] HISTORY:The provided clinical history is \"Cough.\"The patient is 47 years old Male.COMPARISON: 08/14/2019TECHNIQUE: XR CHEST 2VFINDINGS:The cardiomediastinal silhouette is within normal limits. The pulmonary vasculature is within normal limits.No focal lung consolidation. No appreciable pneumothorax or pleural effusion is evident.No acute osseous abnormality.  Impression  1. No radiographic evidence of acute cardiopulmonary abnormality.This radiology report was generated using voice recognition software.  Although reviewed, this document may contain voice recognition errors that are unintentional.-----  F I N A L  -----Transcribed By: AMARILYS 01/15/20 1:55 pmDictated By:            ARDEN MONTALVO MDElectronically Reviewed and Approved By:           ARDEN MONTALVO MD  01/15/20 1:56 pm    08/14/19   XR ADVOCATE PROCEDURE  Narrative  Accession #[id number] Chest radiographCLINICAL HISTORY:vomitingCOMPARISON: 12/15/2007TECHNIQUE: Portable chest radiograph is  obtained.FINDINGS:Heart size is within normal limits.  Trachea is in midline.  Elevated right hemidiaphragm.  No focal lung mass or consolidation.  No pneumothorax.  No significant pleural effusion.  Pulmonary vascularity is within normal limits.  Impression  No active cardiopulmonary process is seen.-----  F I N A L  -----Transcribed By: AMARILYS 08/14/19 5:02 pmDictated By:            ALLAN CALDERON MDElectronically Reviewed and Approved By:           ALLAN CALDERON MD  08/14/19 5:03 pm    LAST U/S:  No results found for this or any previous visit.           Assessment & Plan:  Left distal biceps rupture    Patient was presented with both conservative and surgical treatment options.  Because of the weakness and dysfunction, my recommendation was a surgical intervention with a left distal biceps rupture.  He presents to [facility] for the above procedure.  We will proceed with the procedure and further recommendations will follow surgery.  All his questions were answered best my ability.      Ritesh Mendez DO   3/6/2021 8:24 AM

## 2021-03-16 DIAGNOSIS — E11.65 UNCONTROLLED TYPE 2 DIABETES MELLITUS WITH HYPERGLYCEMIA, WITHOUT LONG-TERM CURRENT USE OF INSULIN (HCC): ICD-10-CM

## 2021-03-16 RX ORDER — DAPAGLIFLOZIN 5 MG/1
TABLET, FILM COATED ORAL
Qty: 90 TABLET | Refills: 0 | Status: SHIPPED | OUTPATIENT
Start: 2021-03-16 | End: 2021-07-19

## 2021-03-18 RX ORDER — DULOXETIN HYDROCHLORIDE 30 MG/1
CAPSULE, DELAYED RELEASE ORAL
Qty: 30 CAPSULE | Refills: 0 | OUTPATIENT
Start: 2021-03-23

## 2021-03-18 NOTE — TELEPHONE ENCOUNTER
He is taking it wrong. He should decide to either commit to taking it every day or stop altogether. It is not a PRN med.  If he he will take it regularly it should be refilled, if not, discontinue it

## 2021-03-18 NOTE — TELEPHONE ENCOUNTER
Medication request: DULoxetine HCl (CYMBALTA) 30 MG Oral Cap DR Particles  Sig:   Take 1 capsule (30 mg total) by mouth daily.     LOV: 2/24/2021  NOV: None    [place]: 2/24/2021 qty#30 r-0    S/w patient in regards refill he does not take it pamella

## 2021-03-19 NOTE — TELEPHONE ENCOUNTER
S/w patient regarding taking this medication once daily or stopping altogether, patient stated he will continue medication as prescribe. Patient verbalized understanding and does not have any questions at this time. He also does not need a refill at this t

## 2021-03-25 ENCOUNTER — OFFICE VISIT (OUTPATIENT)
Dept: SURGERY | Facility: CLINIC | Age: 66
End: 2021-03-25

## 2021-03-25 DIAGNOSIS — M16.12 PRIMARY OSTEOARTHRITIS OF LEFT HIP: Primary | ICD-10-CM

## 2021-03-25 PROCEDURE — 20610 DRAIN/INJ JOINT/BURSA W/O US: CPT | Performed by: PHYSICAL MEDICINE & REHABILITATION

## 2021-03-25 PROCEDURE — 77002 NEEDLE LOCALIZATION BY XRAY: CPT | Performed by: PHYSICAL MEDICINE & REHABILITATION

## 2021-04-13 ENCOUNTER — OFFICE VISIT (OUTPATIENT)
Dept: NEUROLOGY | Facility: CLINIC | Age: 66
End: 2021-04-13
Payer: COMMERCIAL

## 2021-04-13 VITALS — HEIGHT: 66 IN | BODY MASS INDEX: 39.37 KG/M2 | WEIGHT: 245 LBS

## 2021-04-13 DIAGNOSIS — I25.10 ATHEROSCLEROSIS OF NATIVE CORONARY ARTERY OF NATIVE HEART WITHOUT ANGINA PECTORIS: ICD-10-CM

## 2021-04-13 DIAGNOSIS — R12 HEARTBURN: ICD-10-CM

## 2021-04-13 DIAGNOSIS — M16.12 PRIMARY OSTEOARTHRITIS OF LEFT HIP: Primary | ICD-10-CM

## 2021-04-13 DIAGNOSIS — E11.42 CONTROLLED TYPE 2 DIABETES MELLITUS WITH DIABETIC POLYNEUROPATHY, WITHOUT LONG-TERM CURRENT USE OF INSULIN (HCC): ICD-10-CM

## 2021-04-13 PROCEDURE — 99214 OFFICE O/P EST MOD 30 MIN: CPT | Performed by: PHYSICAL MEDICINE & REHABILITATION

## 2021-04-13 PROCEDURE — 3008F BODY MASS INDEX DOCD: CPT | Performed by: PHYSICAL MEDICINE & REHABILITATION

## 2021-04-13 RX ORDER — DULOXETIN HYDROCHLORIDE 30 MG/1
30 CAPSULE, DELAYED RELEASE ORAL DAILY
Qty: 90 CAPSULE | Refills: 3 | Status: SHIPPED | OUTPATIENT
Start: 2021-04-13 | End: 2021-06-15

## 2021-04-13 NOTE — PROGRESS NOTES
[address]  Progress Note    CHIEF COMPLAINT:  Patient presents with:  Hip Pain: Patient presents for post left hip injection. Reports better ROM but symptoms remain the same.  C/o constant pain in hip down t SOCIAL HISTORY:   Social History    Occupational History      Not on file    Tobacco Use      Smoking status: Former Smoker        Packs/day: 1.00        Years: 45.00        Pack years: 39        Types: Cigarettes        Start date: 5/4/2019        Q Glucose Blood (ACCU-CHEK GUIDE) In Vitro Strip To monitor blood sugar three times daily 300 strip 11   • latanoprost 0.005 % Ophthalmic Solution Instill 1 drop by ophthalmic route every night into both eyes 3 Bottle 3   • ACCU-CHEK FASTCLIX LANCETS Does no AND PLAN:  1. Primary osteoarthritis of left hip  I recommended that he see Dr. Mamie Hoyos. I also refilled Cymbalta 30 mg for 1 year. - ORTHOPEDIC - EXTERNAL    2.  Controlled type 2 diabetes mellitus with diabetic polyneuropathy, without long-term current

## 2021-04-15 ENCOUNTER — OFFICE VISIT (OUTPATIENT)
Dept: INTERNAL MEDICINE CLINIC | Facility: CLINIC | Age: 66
End: 2021-04-15
Payer: COMMERCIAL

## 2021-04-15 VITALS
HEIGHT: 66 IN | SYSTOLIC BLOOD PRESSURE: 138 MMHG | DIASTOLIC BLOOD PRESSURE: 81 MMHG | HEART RATE: 61 BPM | BODY MASS INDEX: 39.02 KG/M2 | WEIGHT: 242.81 LBS

## 2021-04-15 DIAGNOSIS — Z87.891 EX-SMOKER: ICD-10-CM

## 2021-04-15 DIAGNOSIS — E11.42 CONTROLLED TYPE 2 DIABETES MELLITUS WITH DIABETIC POLYNEUROPATHY, WITHOUT LONG-TERM CURRENT USE OF INSULIN (HCC): ICD-10-CM

## 2021-04-15 DIAGNOSIS — I10 ESSENTIAL HYPERTENSION: Primary | ICD-10-CM

## 2021-04-15 DIAGNOSIS — E78.00 HYPERCHOLESTEROLEMIA: ICD-10-CM

## 2021-04-15 PROBLEM — K52.9 COLITIS: Status: RESOLVED | Noted: 2020-09-08 | Resolved: 2021-04-15

## 2021-04-15 PROBLEM — M25.552 LEFT HIP PAIN: Status: RESOLVED | Noted: 2020-12-24 | Resolved: 2021-04-15

## 2021-04-15 PROCEDURE — 36416 COLLJ CAPILLARY BLOOD SPEC: CPT | Performed by: INTERNAL MEDICINE

## 2021-04-15 PROCEDURE — 83036 HEMOGLOBIN GLYCOSYLATED A1C: CPT | Performed by: INTERNAL MEDICINE

## 2021-04-15 PROCEDURE — 3051F HG A1C>EQUAL 7.0%<8.0%: CPT | Performed by: INTERNAL MEDICINE

## 2021-04-15 PROCEDURE — 3079F DIAST BP 80-89 MM HG: CPT | Performed by: INTERNAL MEDICINE

## 2021-04-15 PROCEDURE — 3075F SYST BP GE 130 - 139MM HG: CPT | Performed by: INTERNAL MEDICINE

## 2021-04-15 PROCEDURE — 3008F BODY MASS INDEX DOCD: CPT | Performed by: INTERNAL MEDICINE

## 2021-04-15 PROCEDURE — 99214 OFFICE O/P EST MOD 30 MIN: CPT | Performed by: INTERNAL MEDICINE

## 2021-04-15 RX ORDER — METFORMIN HYDROCHLORIDE 500 MG/1
2000 TABLET, EXTENDED RELEASE ORAL
Qty: 360 TABLET | Refills: 0 | Status: SHIPPED | OUTPATIENT
Start: 2021-04-15 | End: 2021-07-11

## 2021-04-15 RX ORDER — AMLODIPINE BESYLATE 10 MG/1
10 TABLET ORAL DAILY
Qty: 90 TABLET | Refills: 1 | Status: SHIPPED | OUTPATIENT
Start: 2021-04-15 | End: 2021-10-11

## 2021-04-15 RX ORDER — METOPROLOL SUCCINATE 100 MG/1
100 TABLET, EXTENDED RELEASE ORAL EVERY EVENING
Qty: 90 TABLET | Refills: 1 | Status: SHIPPED | OUTPATIENT
Start: 2021-04-15 | End: 2022-01-11

## 2021-04-15 RX ORDER — GABAPENTIN 300 MG/1
300 CAPSULE ORAL 2 TIMES DAILY PRN
Qty: 270 CAPSULE | Refills: 1 | COMMUNITY
Start: 2021-04-15 | End: 2021-07-07

## 2021-04-15 RX ORDER — LOSARTAN POTASSIUM 100 MG/1
100 TABLET ORAL DAILY
Qty: 90 TABLET | Refills: 0 | Status: SHIPPED | OUTPATIENT
Start: 2021-04-15 | End: 2021-08-21

## 2021-04-15 NOTE — PROGRESS NOTES
HPI:    Patient ID: Ehsan Kinney is a 76year old male. HPI:  He has hip pain and will see a surgeon for a hip replacement. His A1C is up. He hasn't been exercising because of joint pain. He takes Duloxetine for pain which helps a little.   He d DULoxetine HCl (CYMBALTA) 30 MG Oral Cap DR Particles Take 1 capsule (30 mg total) by mouth daily.  90 capsule 3   • FARXIGA 5 MG Oral Tab TAKE 1 TABLET BY MOUTH EVERY DAY 90 tablet 0   • PRAVASTATIN SODIUM 10 MG Oral Tab TAKE 1 TABLET BY MOUTH EVERY DAY AT • Lipids Neg         hyperlipidemia   • Glaucoma Neg    • Macular degeneration Neg        Review of Systems   Respiratory: Negative for cough, shortness of breath and wheezing. Cardiovascular: Negative for chest pain and palpitations.         ./8 Visit:  Requested Prescriptions     Signed Prescriptions Disp Refills   • amLODIPine Besylate 10 MG Oral Tab 90 tablet 1     Sig: Take 1 tablet (10 mg total) by mouth daily.    • losartan 100 MG Oral Tab 90 tablet 0     Sig: Take 1 tablet (100 mg total) by

## 2021-04-15 NOTE — ASSESSMENT & PLAN NOTE
Patient's A1c went up to 7.3. Urged him to improve his diet. He has an appointment with ophthalmology. His neuropathy has improved and he will try decreasing his gabapentin. Follow-up in 3 months.

## 2021-04-19 RX ORDER — LATANOPROST 50 UG/ML
SOLUTION/ DROPS OPHTHALMIC
Qty: 3 BOTTLE | Refills: 3 | Status: SHIPPED | OUTPATIENT
Start: 2021-04-19

## 2021-04-19 NOTE — TELEPHONE ENCOUNTER
LDE: 10/21/2020  Last visit: 2/24/21   Due for: VF, OCT and IOP check   Upcoming visit: 6/10/21     Routed to [facility]

## 2021-04-30 DIAGNOSIS — L28.0 LICHEN SIMPLEX CHRONICUS: ICD-10-CM

## 2021-04-30 RX ORDER — CLOBETASOL PROPIONATE 0.5 MG/G
CREAM TOPICAL
Qty: 45 G | Refills: 0 | Status: SHIPPED | OUTPATIENT
Start: 2021-04-30 | End: 2021-08-23

## 2021-05-07 ENCOUNTER — TELEPHONE (OUTPATIENT)
Dept: INTERNAL MEDICINE CLINIC | Facility: CLINIC | Age: 66
End: 2021-05-07

## 2021-05-07 NOTE — TELEPHONE ENCOUNTER
Patient is requesting pre op appointment for the week starting 5/11/21. Patient states he will be having a total left hip arthroplasty with Dr. Helmut Ganser. Next available opening is 6/24/21.     Dr. Darleen Good, please confirm if we can schedule to patient on a re

## 2021-05-12 ENCOUNTER — TELEPHONE (OUTPATIENT)
Dept: INTERNAL MEDICINE CLINIC | Facility: CLINIC | Age: 66
End: 2021-05-12

## 2021-05-12 NOTE — TELEPHONE ENCOUNTER
Please reschedule patient. Unfortunately, there was a miscommunication about his appointment because I did not document Skype messages which I had with the . Patient's preop appointment needs to be within 30 days of surgery. Okay to use res24.

## 2021-05-25 DIAGNOSIS — E11.65 UNCONTROLLED TYPE 2 DIABETES MELLITUS WITH HYPERGLYCEMIA, WITHOUT LONG-TERM CURRENT USE OF INSULIN (HCC): ICD-10-CM

## 2021-05-25 DIAGNOSIS — E78.00 HYPERCHOLESTEROLEMIA: ICD-10-CM

## 2021-05-25 RX ORDER — GLIMEPIRIDE 4 MG/1
4 TABLET ORAL
Qty: 90 TABLET | Refills: 1 | Status: SHIPPED | OUTPATIENT
Start: 2021-05-25 | End: 2021-11-24

## 2021-05-25 RX ORDER — PRAVASTATIN SODIUM 10 MG
TABLET ORAL
Qty: 90 TABLET | Refills: 1 | Status: SHIPPED | OUTPATIENT
Start: 2021-05-25 | End: 2021-07-19

## 2021-06-10 ENCOUNTER — OFFICE VISIT (OUTPATIENT)
Dept: OPHTHALMOLOGY | Facility: CLINIC | Age: 66
End: 2021-06-10
Payer: COMMERCIAL

## 2021-06-10 DIAGNOSIS — H40.1132 PRIMARY OPEN ANGLE GLAUCOMA (POAG) OF BOTH EYES, MODERATE STAGE: ICD-10-CM

## 2021-06-10 PROCEDURE — 92083 EXTENDED VISUAL FIELD XM: CPT | Performed by: OPHTHALMOLOGY

## 2021-06-10 PROCEDURE — 99213 OFFICE O/P EST LOW 20 MIN: CPT | Performed by: OPHTHALMOLOGY

## 2021-06-10 PROCEDURE — 92133 CPTRZD OPH DX IMG PST SGM ON: CPT | Performed by: OPHTHALMOLOGY

## 2021-06-10 NOTE — PATIENT INSTRUCTIONS
Primary open angle glaucoma (POAG) of both eyes, moderate stage  Visual field and OCT completed in office today with stable results that were discussed with patient in office today. Intraocular pressure stable, tolerating medications.       Will continue

## 2021-06-10 NOTE — ASSESSMENT & PLAN NOTE
Visual field and OCT completed in office today with stable results that were discussed with patient in office today. Intraocular pressure stable, tolerating medications. Will continue same regimen of Latanoprost at bedtime in both eyes.   Will have

## 2021-06-10 NOTE — PROGRESS NOTES
Edvin Saha is a 76year old male. HPI:     HPI     Patient is here for a VF, OCT and IOP check. He is taking Latanoprost OU at bedtime as directed.       Last edited by Zenobia Lizama OT on 6/10/2021  8:31 AM. (History)        Patient History: Patient has tried to quit several time, and unsuccessful.     Vaping Use      Vaping Use: Never used    Alcohol use: Not Currently      Alcohol/week: 0.0 standard drinks      Comment: very rare    Drug use: No      Medications:  Current Outpatient Medicatio Allergies    ROS:       PHYSICAL EXAM:     Base Eye Exam     Visual Acuity (Snellen - Linear)       Right Left    Dist cc 20/30 -2 20/50    Dist ph cc  20/40 -2          Tonometry (Applanation, 8:35 AM)       Right Left    Pressure 17 17          Pachymetr encounter        Follow up instructions:  Return in about 4 months (around 10/10/2021) for Diabetic eye exam, Photos.     6/10/2021  Scribed by: Jeff Marino MD

## 2021-06-15 ENCOUNTER — OFFICE VISIT (OUTPATIENT)
Dept: INTERNAL MEDICINE CLINIC | Facility: CLINIC | Age: 66
End: 2021-06-15
Payer: COMMERCIAL

## 2021-06-15 ENCOUNTER — HOSPITAL ENCOUNTER (OUTPATIENT)
Dept: GENERAL RADIOLOGY | Facility: HOSPITAL | Age: 66
Discharge: HOME | End: 2021-06-15
Attending: INTERNAL MEDICINE
Payer: COMMERCIAL

## 2021-06-15 ENCOUNTER — LAB ENCOUNTER (OUTPATIENT)
Dept: LAB | Facility: HOSPITAL | Age: 66
End: 2021-06-15
Attending: INTERNAL MEDICINE
Payer: COMMERCIAL

## 2021-06-15 VITALS
HEART RATE: 90 BPM | DIASTOLIC BLOOD PRESSURE: 60 MMHG | WEIGHT: 240 LBS | HEIGHT: 66 IN | RESPIRATION RATE: 16 BRPM | BODY MASS INDEX: 38.57 KG/M2 | SYSTOLIC BLOOD PRESSURE: 116 MMHG

## 2021-06-15 DIAGNOSIS — E11.42 TYPE 2 DIABETES MELLITUS WITH DIABETIC POLYNEUROPATHY, WITHOUT LONG-TERM CURRENT USE OF INSULIN (HCC): ICD-10-CM

## 2021-06-15 DIAGNOSIS — Z01.818 PRE-OP EXAM: ICD-10-CM

## 2021-06-15 DIAGNOSIS — M16.12 OSTEOARTHRITIS OF LEFT HIP, UNSPECIFIED OSTEOARTHRITIS TYPE: ICD-10-CM

## 2021-06-15 DIAGNOSIS — I10 ESSENTIAL HYPERTENSION: ICD-10-CM

## 2021-06-15 DIAGNOSIS — M54.50 CHRONIC LOW BACK PAIN, UNSPECIFIED BACK PAIN LATERALITY, UNSPECIFIED WHETHER SCIATICA PRESENT: ICD-10-CM

## 2021-06-15 DIAGNOSIS — Z01.818 PRE-OP EXAM: Primary | ICD-10-CM

## 2021-06-15 DIAGNOSIS — G89.29 CHRONIC LOW BACK PAIN, UNSPECIFIED BACK PAIN LATERALITY, UNSPECIFIED WHETHER SCIATICA PRESENT: ICD-10-CM

## 2021-06-15 DIAGNOSIS — I25.10 ATHEROSCLEROSIS OF NATIVE CORONARY ARTERY OF NATIVE HEART WITHOUT ANGINA PECTORIS: ICD-10-CM

## 2021-06-15 DIAGNOSIS — L27.0 DRUG RASH: ICD-10-CM

## 2021-06-15 PROBLEM — R12 HEARTBURN: Status: RESOLVED | Noted: 2020-03-03 | Resolved: 2021-06-15

## 2021-06-15 PROBLEM — M54.42 ACUTE LOW BACK PAIN WITH LEFT-SIDED SCIATICA: Status: RESOLVED | Noted: 2018-05-16 | Resolved: 2021-06-15

## 2021-06-15 PROBLEM — Z00.00 ROUTINE HEALTH MAINTENANCE: Status: RESOLVED | Noted: 2018-08-20 | Resolved: 2021-06-15

## 2021-06-15 PROCEDURE — 80053 COMPREHEN METABOLIC PANEL: CPT

## 2021-06-15 PROCEDURE — 93010 ELECTROCARDIOGRAM REPORT: CPT | Performed by: INTERNAL MEDICINE

## 2021-06-15 PROCEDURE — 71046 X-RAY EXAM CHEST 2 VIEWS: CPT | Performed by: INTERNAL MEDICINE

## 2021-06-15 PROCEDURE — 93005 ELECTROCARDIOGRAM TRACING: CPT

## 2021-06-15 PROCEDURE — 3074F SYST BP LT 130 MM HG: CPT | Performed by: INTERNAL MEDICINE

## 2021-06-15 PROCEDURE — 99214 OFFICE O/P EST MOD 30 MIN: CPT | Performed by: INTERNAL MEDICINE

## 2021-06-15 PROCEDURE — 3008F BODY MASS INDEX DOCD: CPT | Performed by: INTERNAL MEDICINE

## 2021-06-15 PROCEDURE — 36415 COLL VENOUS BLD VENIPUNCTURE: CPT

## 2021-06-15 PROCEDURE — 85610 PROTHROMBIN TIME: CPT

## 2021-06-15 PROCEDURE — 85025 COMPLETE CBC W/AUTO DIFF WBC: CPT

## 2021-06-15 PROCEDURE — 3044F HG A1C LEVEL LT 7.0%: CPT | Performed by: INTERNAL MEDICINE

## 2021-06-15 PROCEDURE — 3078F DIAST BP <80 MM HG: CPT | Performed by: INTERNAL MEDICINE

## 2021-06-15 PROCEDURE — 87641 MR-STAPH DNA AMP PROBE: CPT

## 2021-06-15 PROCEDURE — 83036 HEMOGLOBIN GLYCOSYLATED A1C: CPT

## 2021-06-15 PROCEDURE — 87147 CULTURE TYPE IMMUNOLOGIC: CPT

## 2021-06-15 PROCEDURE — 81003 URINALYSIS AUTO W/O SCOPE: CPT

## 2021-06-15 PROCEDURE — 85730 THROMBOPLASTIN TIME PARTIAL: CPT

## 2021-06-15 PROCEDURE — 87086 URINE CULTURE/COLONY COUNT: CPT

## 2021-06-15 NOTE — PROGRESS NOTES
HPI:    Patient ID: Irma Nolen is a 76year old male. HPI:  Pt here for pre-op physical for left THR on July 9, 2021 by Dr. Kong Yates. Pt c/o left hip pain since November 2020.   He had PT and 2 injections, but he still has moderate-severe const MG Oral Cap Take 1 capsule (300 mg total) by mouth 2 (two) times daily as needed. 270 capsule 1   • losartan 100 MG Oral Tab Take 1 tablet (100 mg total) by mouth daily.  90 tablet 0   • metFORMIN HCl  MG Oral Tablet 24 Hr Take 4 tablets (2,000 mg tot ear pain, postnasal drip and sore throat. Eyes: Negative for pain and visual disturbance. Respiratory: Negative for cough, shortness of breath and wheezing. Cardiovascular: Negative for chest pain and palpitations.    Gastrointestinal: Negative for Skin:     General: Skin is warm and dry. Findings: Rash present. Comments: Maculopapular eruption on extremities   Neurological:      Mental Status: He is alert and oriented to person, place, and time.       Cranial Nerves: No cranial nerve defi

## 2021-06-16 ENCOUNTER — TELEPHONE (OUTPATIENT)
Dept: INTERNAL MEDICINE CLINIC | Facility: CLINIC | Age: 66
End: 2021-06-16

## 2021-06-16 NOTE — TELEPHONE ENCOUNTER
Please fax preop progress note and EKG tracing to Dr. Alexandra Zuluaga other [facility] office (not [facility].)    Phone Fax E-mail Address   [phone] Not available [facility] [address]    [id number] [facility] [id number]

## 2021-07-07 ENCOUNTER — TELEPHONE (OUTPATIENT)
Dept: INTERNAL MEDICINE CLINIC | Facility: CLINIC | Age: 66
End: 2021-07-07

## 2021-07-07 ENCOUNTER — LAB ENCOUNTER (OUTPATIENT)
Dept: LAB | Facility: HOSPITAL | Age: 66
End: 2021-07-07
Attending: ORTHOPAEDIC SURGERY
Payer: COMMERCIAL

## 2021-07-07 DIAGNOSIS — Z01.818 PRE-OP TESTING: ICD-10-CM

## 2021-07-07 LAB
ANTIBODY SCREEN: NEGATIVE
RH BLOOD TYPE: POSITIVE

## 2021-07-07 PROCEDURE — 86900 BLOOD TYPING SEROLOGIC ABO: CPT

## 2021-07-07 PROCEDURE — 36415 COLL VENOUS BLD VENIPUNCTURE: CPT

## 2021-07-07 PROCEDURE — 86901 BLOOD TYPING SEROLOGIC RH(D): CPT

## 2021-07-07 PROCEDURE — 86850 RBC ANTIBODY SCREEN: CPT

## 2021-07-07 NOTE — TELEPHONE ENCOUNTER
Patient is requesting a call back. He is scheduled for hip replacement surgery on Friday morning. He needs to know if he needs to stop taking the Asprin regimen he is currently on or can he keep taking. Surgeon wants to make sure with Dr. Michael Lucia.  Please c

## 2021-07-08 NOTE — H&P
[address] Patient Status:  Outpatient in a Bed    1955 MRN [id number]   Location [place] [address] Attending Kendra Poole, *   Hosp Day # 0 PCP Saroj Haddad MD Surgeon: Wild Wilkinson MD;  Location: [facility] ENDOSCOPY   • ELECTROCARDIOGRAM, COMPLETE  11-    scanned to media tab   • KNEE SURGERY     • TONSILLECTOMY       Family History   Problem Relation Age of Onset   • Heart Disease Mother    • Diabetes Mot 06/15/2021    ALKPHO 66 06/15/2021    BILT 0.2 06/15/2021    TP 8.1 06/15/2021    AST 21 06/15/2021    ALT 32 06/15/2021    PTT 32.9 06/15/2021    INR 0.91 06/15/2021    T4F 0.8 11/05/2019    TSH 1.360 01/04/2021    LIP 27 01/11/2018    PSA 1.2 08/27/2018

## 2021-07-09 ENCOUNTER — ANESTHESIA (OUTPATIENT)
Dept: SURGERY | Facility: HOSPITAL | Age: 66
End: 2021-07-09
Payer: COMMERCIAL

## 2021-07-09 ENCOUNTER — HOSPITAL ENCOUNTER (OUTPATIENT)
Facility: HOSPITAL | Age: 66
Discharge: HOME HEALTH | End: 2021-07-10
Attending: ORTHOPAEDIC SURGERY | Admitting: ORTHOPAEDIC SURGERY
Payer: COMMERCIAL

## 2021-07-09 ENCOUNTER — APPOINTMENT (OUTPATIENT)
Dept: GENERAL RADIOLOGY | Facility: HOSPITAL | Age: 66
End: 2021-07-09
Attending: PHYSICIAN ASSISTANT
Payer: COMMERCIAL

## 2021-07-09 ENCOUNTER — APPOINTMENT (OUTPATIENT)
Dept: GENERAL RADIOLOGY | Facility: HOSPITAL | Age: 66
End: 2021-07-09
Attending: ORTHOPAEDIC SURGERY
Payer: COMMERCIAL

## 2021-07-09 ENCOUNTER — ANESTHESIA EVENT (OUTPATIENT)
Dept: SURGERY | Facility: HOSPITAL | Age: 66
End: 2021-07-09
Payer: COMMERCIAL

## 2021-07-09 DIAGNOSIS — Z01.818 PRE-OP TESTING: Primary | ICD-10-CM

## 2021-07-09 LAB
DEPRECATED RDW RBC AUTO: 51.5 FL (ref 35.1–46.3)
ERYTHROCYTE [DISTWIDTH] IN BLOOD BY AUTOMATED COUNT: 14.6 % (ref 11–15)
GLUCOSE BLDC GLUCOMTR-MCNC: 105 MG/DL (ref 70–99)
GLUCOSE BLDC GLUCOMTR-MCNC: 115 MG/DL (ref 70–99)
GLUCOSE BLDC GLUCOMTR-MCNC: 141 MG/DL (ref 70–99)
GLUCOSE BLDC GLUCOMTR-MCNC: 92 MG/DL (ref 70–99)
HBV SURFACE AG SER-ACNC: <0.1 [IU]/L
HBV SURFACE AG SERPL QL IA: NONREACTIVE
HCT VFR BLD AUTO: 37.8 %
HCV AB SERPL QL IA: NONREACTIVE
HGB BLD-MCNC: 12.2 G/DL
HIV 1+2 AB+HIV1 P24 AG SERPL QL IA: NONREACTIVE
MCH RBC QN AUTO: 30.9 PG (ref 26–34)
MCHC RBC AUTO-ENTMCNC: 32.3 G/DL (ref 31–37)
MCV RBC AUTO: 95.7 FL
PLATELET # BLD AUTO: 275 10(3)UL (ref 150–450)
RBC # BLD AUTO: 3.95 X10(6)UL
WBC # BLD AUTO: 8.8 X10(3) UL (ref 4–11)

## 2021-07-09 PROCEDURE — 0SRB0JZ REPLACEMENT OF LEFT HIP JOINT WITH SYNTHETIC SUBSTITUTE, OPEN APPROACH: ICD-10-PCS | Performed by: ORTHOPAEDIC SURGERY

## 2021-07-09 PROCEDURE — 73502 X-RAY EXAM HIP UNI 2-3 VIEWS: CPT | Performed by: PHYSICIAN ASSISTANT

## 2021-07-09 PROCEDURE — 76000 FLUOROSCOPY <1 HR PHYS/QHP: CPT | Performed by: ORTHOPAEDIC SURGERY

## 2021-07-09 PROCEDURE — 99204 OFFICE O/P NEW MOD 45 MIN: CPT | Performed by: HOSPITALIST

## 2021-07-09 DEVICE — CANCELLOUS BONE SCREW FLAT HEAD Ø 6,5 L30
Type: IMPLANTABLE DEVICE | Site: HIP | Status: FUNCTIONAL
Brand: MPACT ACETABULAR SYSTEM

## 2021-07-09 DEVICE — FLAT PE  HC LINER Ø 32 / E
Type: IMPLANTABLE DEVICE | Site: HIP | Status: FUNCTIONAL
Brand: MPACT ACETABULAR SYSTEM

## 2021-07-09 DEVICE — ACETABULAR SHELL Ø52 TWO HOLES
Type: IMPLANTABLE DEVICE | Site: HIP | Status: FUNCTIONAL
Brand: MPACT ACETABULAR SYSTEM

## 2021-07-09 DEVICE — TOTAL HIP W/CER HEAD: Type: IMPLANTABLE DEVICE | Status: FUNCTIONAL

## 2021-07-09 DEVICE — FEMORAL HEAD Ø 32 SIZE S
Type: IMPLANTABLE DEVICE | Site: HIP | Status: FUNCTIONAL
Brand: MECTACER BIOLOX DELTA FEMORAL BALL HEAD

## 2021-07-09 RX ORDER — HYDROCODONE BITARTRATE AND ACETAMINOPHEN 5; 325 MG/1; MG/1
1 TABLET ORAL AS NEEDED
Status: DISCONTINUED | OUTPATIENT
Start: 2021-07-09 | End: 2021-07-09 | Stop reason: HOSPADM

## 2021-07-09 RX ORDER — PRAVASTATIN SODIUM 20 MG
10 TABLET ORAL NIGHTLY
Status: DISCONTINUED | OUTPATIENT
Start: 2021-07-09 | End: 2021-07-10

## 2021-07-09 RX ORDER — BISACODYL 10 MG
10 SUPPOSITORY, RECTAL RECTAL
Status: DISCONTINUED | OUTPATIENT
Start: 2021-07-09 | End: 2021-07-10

## 2021-07-09 RX ORDER — PROCHLORPERAZINE EDISYLATE 5 MG/ML
5 INJECTION INTRAMUSCULAR; INTRAVENOUS ONCE AS NEEDED
Status: DISCONTINUED | OUTPATIENT
Start: 2021-07-09 | End: 2021-07-09 | Stop reason: HOSPADM

## 2021-07-09 RX ORDER — DOCUSATE SODIUM 100 MG/1
100 CAPSULE, LIQUID FILLED ORAL 2 TIMES DAILY
Status: DISCONTINUED | OUTPATIENT
Start: 2021-07-09 | End: 2021-07-10

## 2021-07-09 RX ORDER — DIPHENHYDRAMINE HYDROCHLORIDE 50 MG/ML
12.5 INJECTION INTRAMUSCULAR; INTRAVENOUS EVERY 4 HOURS PRN
Status: DISCONTINUED | OUTPATIENT
Start: 2021-07-09 | End: 2021-07-10

## 2021-07-09 RX ORDER — MORPHINE SULFATE 4 MG/ML
2 INJECTION, SOLUTION INTRAMUSCULAR; INTRAVENOUS EVERY 10 MIN PRN
Status: DISCONTINUED | OUTPATIENT
Start: 2021-07-09 | End: 2021-07-09 | Stop reason: HOSPADM

## 2021-07-09 RX ORDER — METOPROLOL SUCCINATE 50 MG/1
100 TABLET, EXTENDED RELEASE ORAL EVERY EVENING
Status: DISCONTINUED | OUTPATIENT
Start: 2021-07-10 | End: 2021-07-10

## 2021-07-09 RX ORDER — SODIUM CHLORIDE, SODIUM LACTATE, POTASSIUM CHLORIDE, CALCIUM CHLORIDE 600; 310; 30; 20 MG/100ML; MG/100ML; MG/100ML; MG/100ML
INJECTION, SOLUTION INTRAVENOUS CONTINUOUS
Status: DISCONTINUED | OUTPATIENT
Start: 2021-07-09 | End: 2021-07-09 | Stop reason: HOSPADM

## 2021-07-09 RX ORDER — GLIMEPIRIDE 4 MG/1
4 TABLET ORAL
Status: DISCONTINUED | OUTPATIENT
Start: 2021-07-10 | End: 2021-07-10

## 2021-07-09 RX ORDER — HYDROMORPHONE HYDROCHLORIDE 1 MG/ML
0.6 INJECTION, SOLUTION INTRAMUSCULAR; INTRAVENOUS; SUBCUTANEOUS EVERY 5 MIN PRN
Status: DISCONTINUED | OUTPATIENT
Start: 2021-07-09 | End: 2021-07-09 | Stop reason: HOSPADM

## 2021-07-09 RX ORDER — BUPIVACAINE HYDROCHLORIDE 7.5 MG/ML
INJECTION, SOLUTION INTRASPINAL
Status: COMPLETED | OUTPATIENT
Start: 2021-07-09 | End: 2021-07-09

## 2021-07-09 RX ORDER — TRANEXAMIC ACID 10 MG/ML
INJECTION, SOLUTION INTRAVENOUS AS NEEDED
Status: DISCONTINUED | OUTPATIENT
Start: 2021-07-09 | End: 2021-07-09 | Stop reason: SURG

## 2021-07-09 RX ORDER — SENNOSIDES 8.6 MG
17.2 TABLET ORAL NIGHTLY
Status: DISCONTINUED | OUTPATIENT
Start: 2021-07-09 | End: 2021-07-10

## 2021-07-09 RX ORDER — FAMOTIDINE 20 MG/1
20 TABLET ORAL ONCE
Status: DISCONTINUED | OUTPATIENT
Start: 2021-07-09 | End: 2021-07-09 | Stop reason: HOSPADM

## 2021-07-09 RX ORDER — LATANOPROST 50 UG/ML
1 SOLUTION/ DROPS OPHTHALMIC NIGHTLY
Status: DISCONTINUED | OUTPATIENT
Start: 2021-07-09 | End: 2021-07-10

## 2021-07-09 RX ORDER — NALBUPHINE HCL 10 MG/ML
2.5 AMPUL (ML) INJECTION EVERY 4 HOURS PRN
Status: DISCONTINUED | OUTPATIENT
Start: 2021-07-09 | End: 2021-07-10

## 2021-07-09 RX ORDER — DIPHENHYDRAMINE HCL 25 MG
25 CAPSULE ORAL EVERY 4 HOURS PRN
Status: DISCONTINUED | OUTPATIENT
Start: 2021-07-09 | End: 2021-07-10

## 2021-07-09 RX ORDER — DEXTROSE MONOHYDRATE 25 G/50ML
50 INJECTION, SOLUTION INTRAVENOUS
Status: DISCONTINUED | OUTPATIENT
Start: 2021-07-09 | End: 2021-07-10

## 2021-07-09 RX ORDER — VANCOMYCIN HYDROCHLORIDE
15 ONCE
Status: COMPLETED | OUTPATIENT
Start: 2021-07-09 | End: 2021-07-10

## 2021-07-09 RX ORDER — HYDROCODONE BITARTRATE AND ACETAMINOPHEN 5; 325 MG/1; MG/1
2 TABLET ORAL AS NEEDED
Status: DISCONTINUED | OUTPATIENT
Start: 2021-07-09 | End: 2021-07-09 | Stop reason: HOSPADM

## 2021-07-09 RX ORDER — MORPHINE SULFATE 10 MG/ML
6 INJECTION, SOLUTION INTRAMUSCULAR; INTRAVENOUS EVERY 10 MIN PRN
Status: DISCONTINUED | OUTPATIENT
Start: 2021-07-09 | End: 2021-07-09 | Stop reason: HOSPADM

## 2021-07-09 RX ORDER — AMLODIPINE BESYLATE 10 MG/1
10 TABLET ORAL DAILY
Status: DISCONTINUED | OUTPATIENT
Start: 2021-07-10 | End: 2021-07-10

## 2021-07-09 RX ORDER — CEFAZOLIN SODIUM/WATER 2 G/20 ML
2 SYRINGE (ML) INTRAVENOUS ONCE
Status: COMPLETED | OUTPATIENT
Start: 2021-07-09 | End: 2021-07-09

## 2021-07-09 RX ORDER — CELECOXIB 200 MG/1
400 CAPSULE ORAL ONCE
Status: COMPLETED | OUTPATIENT
Start: 2021-07-09 | End: 2021-07-09

## 2021-07-09 RX ORDER — METOCLOPRAMIDE 10 MG/1
10 TABLET ORAL ONCE
Status: COMPLETED | OUTPATIENT
Start: 2021-07-09 | End: 2021-07-09

## 2021-07-09 RX ORDER — HYDROCODONE BITARTRATE AND ACETAMINOPHEN 5; 325 MG/1; MG/1
1 TABLET ORAL EVERY 4 HOURS PRN
Status: DISCONTINUED | OUTPATIENT
Start: 2021-07-09 | End: 2021-07-10

## 2021-07-09 RX ORDER — SODIUM PHOSPHATE, DIBASIC AND SODIUM PHOSPHATE, MONOBASIC 7; 19 G/133ML; G/133ML
1 ENEMA RECTAL ONCE AS NEEDED
Status: DISCONTINUED | OUTPATIENT
Start: 2021-07-09 | End: 2021-07-10

## 2021-07-09 RX ORDER — NALOXONE HYDROCHLORIDE 0.4 MG/ML
80 INJECTION, SOLUTION INTRAMUSCULAR; INTRAVENOUS; SUBCUTANEOUS AS NEEDED
Status: DISCONTINUED | OUTPATIENT
Start: 2021-07-09 | End: 2021-07-09 | Stop reason: HOSPADM

## 2021-07-09 RX ORDER — ONDANSETRON 2 MG/ML
4 INJECTION INTRAMUSCULAR; INTRAVENOUS EVERY 4 HOURS PRN
Status: DISCONTINUED | OUTPATIENT
Start: 2021-07-09 | End: 2021-07-10

## 2021-07-09 RX ORDER — PROCHLORPERAZINE EDISYLATE 5 MG/ML
5 INJECTION INTRAMUSCULAR; INTRAVENOUS ONCE AS NEEDED
Status: ACTIVE | OUTPATIENT
Start: 2021-07-09 | End: 2021-07-09

## 2021-07-09 RX ORDER — HYDROCODONE BITARTRATE AND ACETAMINOPHEN 7.5; 325 MG/1; MG/1
2 TABLET ORAL EVERY 6 HOURS PRN
Status: DISCONTINUED | OUTPATIENT
Start: 2021-07-09 | End: 2021-07-10

## 2021-07-09 RX ORDER — MELATONIN
325
Status: DISCONTINUED | OUTPATIENT
Start: 2021-07-10 | End: 2021-07-10

## 2021-07-09 RX ORDER — MIDAZOLAM HYDROCHLORIDE 1 MG/ML
INJECTION INTRAMUSCULAR; INTRAVENOUS AS NEEDED
Status: DISCONTINUED | OUTPATIENT
Start: 2021-07-09 | End: 2021-07-09 | Stop reason: SURG

## 2021-07-09 RX ORDER — HYDROCODONE BITARTRATE AND ACETAMINOPHEN 7.5; 325 MG/1; MG/1
1 TABLET ORAL EVERY 6 HOURS PRN
Status: DISCONTINUED | OUTPATIENT
Start: 2021-07-09 | End: 2021-07-10

## 2021-07-09 RX ORDER — DEXTROSE, SODIUM CHLORIDE, AND POTASSIUM CHLORIDE 5; .45; .15 G/100ML; G/100ML; G/100ML
INJECTION INTRAVENOUS CONTINUOUS
Status: DISCONTINUED | OUTPATIENT
Start: 2021-07-09 | End: 2021-07-10

## 2021-07-09 RX ORDER — LIDOCAINE HYDROCHLORIDE 10 MG/ML
INJECTION, SOLUTION INFILTRATION; PERINEURAL
Status: COMPLETED | OUTPATIENT
Start: 2021-07-09 | End: 2021-07-09

## 2021-07-09 RX ORDER — ACETAMINOPHEN 500 MG
1000 TABLET ORAL ONCE
Status: COMPLETED | OUTPATIENT
Start: 2021-07-09 | End: 2021-07-09

## 2021-07-09 RX ORDER — VANCOMYCIN/0.9 % SOD CHLORIDE 1.75 G/5
15 PLASTIC BAG, INJECTION (ML) INTRAVENOUS ONCE
Status: DISCONTINUED | OUTPATIENT
Start: 2021-07-09 | End: 2021-07-09

## 2021-07-09 RX ORDER — NALOXONE HYDROCHLORIDE 0.4 MG/ML
0.08 INJECTION, SOLUTION INTRAMUSCULAR; INTRAVENOUS; SUBCUTANEOUS
Status: DISCONTINUED | OUTPATIENT
Start: 2021-07-09 | End: 2021-07-10

## 2021-07-09 RX ORDER — ONDANSETRON 2 MG/ML
4 INJECTION INTRAMUSCULAR; INTRAVENOUS ONCE AS NEEDED
Status: DISCONTINUED | OUTPATIENT
Start: 2021-07-09 | End: 2021-07-09 | Stop reason: HOSPADM

## 2021-07-09 RX ORDER — ASPIRIN 325 MG
325 TABLET, DELAYED RELEASE (ENTERIC COATED) ORAL 2 TIMES DAILY
Status: DISCONTINUED | OUTPATIENT
Start: 2021-07-09 | End: 2021-07-10

## 2021-07-09 RX ORDER — METOPROLOL TARTRATE 5 MG/5ML
2.5 INJECTION INTRAVENOUS ONCE
Status: DISCONTINUED | OUTPATIENT
Start: 2021-07-09 | End: 2021-07-09 | Stop reason: HOSPADM

## 2021-07-09 RX ORDER — LOSARTAN POTASSIUM 100 MG/1
100 TABLET ORAL DAILY
Status: DISCONTINUED | OUTPATIENT
Start: 2021-07-10 | End: 2021-07-10

## 2021-07-09 RX ORDER — PANTOPRAZOLE SODIUM 20 MG/1
20 TABLET, DELAYED RELEASE ORAL
Status: DISCONTINUED | OUTPATIENT
Start: 2021-07-10 | End: 2021-07-10

## 2021-07-09 RX ORDER — HYDROMORPHONE HYDROCHLORIDE 1 MG/ML
0.4 INJECTION, SOLUTION INTRAMUSCULAR; INTRAVENOUS; SUBCUTANEOUS EVERY 2 HOUR PRN
Status: DISCONTINUED | OUTPATIENT
Start: 2021-07-09 | End: 2021-07-10

## 2021-07-09 RX ORDER — HYDROMORPHONE HYDROCHLORIDE 1 MG/ML
0.4 INJECTION, SOLUTION INTRAMUSCULAR; INTRAVENOUS; SUBCUTANEOUS
Status: DISCONTINUED | OUTPATIENT
Start: 2021-07-09 | End: 2021-07-10

## 2021-07-09 RX ORDER — FAMOTIDINE 20 MG/1
20 TABLET ORAL 2 TIMES DAILY
Status: DISCONTINUED | OUTPATIENT
Start: 2021-07-09 | End: 2021-07-09

## 2021-07-09 RX ORDER — HALOPERIDOL 5 MG/ML
0.5 INJECTION INTRAMUSCULAR ONCE AS NEEDED
Status: ACTIVE | OUTPATIENT
Start: 2021-07-09 | End: 2021-07-09

## 2021-07-09 RX ORDER — MORPHINE SULFATE 4 MG/ML
4 INJECTION, SOLUTION INTRAMUSCULAR; INTRAVENOUS EVERY 10 MIN PRN
Status: DISCONTINUED | OUTPATIENT
Start: 2021-07-09 | End: 2021-07-09 | Stop reason: HOSPADM

## 2021-07-09 RX ORDER — MORPHINE SULFATE 1 MG/ML
INJECTION, SOLUTION EPIDURAL; INTRATHECAL; INTRAVENOUS
Status: COMPLETED | OUTPATIENT
Start: 2021-07-09 | End: 2021-07-09

## 2021-07-09 RX ORDER — SODIUM CHLORIDE, SODIUM LACTATE, POTASSIUM CHLORIDE, CALCIUM CHLORIDE 600; 310; 30; 20 MG/100ML; MG/100ML; MG/100ML; MG/100ML
INJECTION, SOLUTION INTRAVENOUS CONTINUOUS
Status: DISCONTINUED | OUTPATIENT
Start: 2021-07-09 | End: 2021-07-10

## 2021-07-09 RX ORDER — DIPHENHYDRAMINE HYDROCHLORIDE 50 MG/ML
25 INJECTION INTRAMUSCULAR; INTRAVENOUS ONCE AS NEEDED
Status: ACTIVE | OUTPATIENT
Start: 2021-07-09 | End: 2021-07-09

## 2021-07-09 RX ORDER — HYDROMORPHONE HYDROCHLORIDE 1 MG/ML
0.6 INJECTION, SOLUTION INTRAMUSCULAR; INTRAVENOUS; SUBCUTANEOUS
Status: DISCONTINUED | OUTPATIENT
Start: 2021-07-09 | End: 2021-07-10

## 2021-07-09 RX ORDER — HYDROMORPHONE HYDROCHLORIDE 1 MG/ML
0.2 INJECTION, SOLUTION INTRAMUSCULAR; INTRAVENOUS; SUBCUTANEOUS EVERY 2 HOUR PRN
Status: DISCONTINUED | OUTPATIENT
Start: 2021-07-09 | End: 2021-07-10

## 2021-07-09 RX ORDER — ONDANSETRON 2 MG/ML
4 INJECTION INTRAMUSCULAR; INTRAVENOUS ONCE AS NEEDED
Status: ACTIVE | OUTPATIENT
Start: 2021-07-09 | End: 2021-07-09

## 2021-07-09 RX ORDER — EPHEDRINE SULFATE 50 MG/ML
INJECTION INTRAVENOUS AS NEEDED
Status: DISCONTINUED | OUTPATIENT
Start: 2021-07-09 | End: 2021-07-09 | Stop reason: SURG

## 2021-07-09 RX ORDER — HYDROMORPHONE HYDROCHLORIDE 1 MG/ML
0.2 INJECTION, SOLUTION INTRAMUSCULAR; INTRAVENOUS; SUBCUTANEOUS EVERY 5 MIN PRN
Status: DISCONTINUED | OUTPATIENT
Start: 2021-07-09 | End: 2021-07-09 | Stop reason: HOSPADM

## 2021-07-09 RX ORDER — ACETAMINOPHEN 325 MG/1
650 TABLET ORAL EVERY 6 HOURS PRN
Status: DISCONTINUED | OUTPATIENT
Start: 2021-07-09 | End: 2021-07-10

## 2021-07-09 RX ORDER — HALOPERIDOL 5 MG/ML
0.25 INJECTION INTRAMUSCULAR ONCE AS NEEDED
Status: DISCONTINUED | OUTPATIENT
Start: 2021-07-09 | End: 2021-07-09 | Stop reason: HOSPADM

## 2021-07-09 RX ORDER — DEXTROSE MONOHYDRATE 25 G/50ML
50 INJECTION, SOLUTION INTRAVENOUS
Status: DISCONTINUED | OUTPATIENT
Start: 2021-07-09 | End: 2021-07-09 | Stop reason: HOSPADM

## 2021-07-09 RX ORDER — POLYETHYLENE GLYCOL 3350 17 G/17G
17 POWDER, FOR SOLUTION ORAL DAILY PRN
Status: DISCONTINUED | OUTPATIENT
Start: 2021-07-09 | End: 2021-07-10

## 2021-07-09 RX ORDER — HYDROMORPHONE HYDROCHLORIDE 1 MG/ML
0.4 INJECTION, SOLUTION INTRAMUSCULAR; INTRAVENOUS; SUBCUTANEOUS EVERY 5 MIN PRN
Status: DISCONTINUED | OUTPATIENT
Start: 2021-07-09 | End: 2021-07-09 | Stop reason: HOSPADM

## 2021-07-09 RX ORDER — PROCHLORPERAZINE EDISYLATE 5 MG/ML
10 INJECTION INTRAMUSCULAR; INTRAVENOUS EVERY 6 HOURS PRN
Status: DISCONTINUED | OUTPATIENT
Start: 2021-07-09 | End: 2021-07-10

## 2021-07-09 RX ORDER — FAMOTIDINE 10 MG/ML
20 INJECTION, SOLUTION INTRAVENOUS 2 TIMES DAILY
Status: DISCONTINUED | OUTPATIENT
Start: 2021-07-09 | End: 2021-07-09

## 2021-07-09 RX ORDER — METOPROLOL TARTRATE 50 MG/1
50 TABLET, FILM COATED ORAL ONCE
Status: DISCONTINUED | OUTPATIENT
Start: 2021-07-09 | End: 2021-07-09 | Stop reason: HOSPADM

## 2021-07-09 RX ADMIN — LIDOCAINE HYDROCHLORIDE 5 ML: 10 INJECTION, SOLUTION INFILTRATION; PERINEURAL at 12:50:00

## 2021-07-09 RX ADMIN — SODIUM CHLORIDE, SODIUM LACTATE, POTASSIUM CHLORIDE, CALCIUM CHLORIDE: 600; 310; 30; 20 INJECTION, SOLUTION INTRAVENOUS at 15:09:00

## 2021-07-09 RX ADMIN — CEFAZOLIN SODIUM/WATER 2 G: 2 G/20 ML SYRINGE (ML) INTRAVENOUS at 13:05:00

## 2021-07-09 RX ADMIN — MIDAZOLAM HYDROCHLORIDE 2 MG: 1 INJECTION INTRAMUSCULAR; INTRAVENOUS at 12:40:00

## 2021-07-09 RX ADMIN — MORPHINE SULFATE 0.3 MG: 1 INJECTION, SOLUTION EPIDURAL; INTRATHECAL; INTRAVENOUS at 12:50:00

## 2021-07-09 RX ADMIN — TRANEXAMIC ACID 1000 MG: 10 INJECTION, SOLUTION INTRAVENOUS at 13:10:00

## 2021-07-09 RX ADMIN — BUPIVACAINE HYDROCHLORIDE 1.5 ML: 7.5 INJECTION, SOLUTION INTRASPINAL at 12:50:00

## 2021-07-09 RX ADMIN — EPHEDRINE SULFATE 10 MG: 50 INJECTION INTRAVENOUS at 14:19:00

## 2021-07-09 NOTE — OPERATIVE REPORT
OPERATIVE REPORT     PREOPERATIVE DIAGNOSIS:  Osteoarthritis, left hip. POSTOPERATIVE DIAGNOSIS:  Osteoarthritis, left hip. PROCEDURE:  left total hip arthroplasty via anterior approach with C-arm control.      ASSISTANT:  Moisés Duong PA-C. verified with the C-arm. A Efficiency Exchange Mpact 2-hole acetabulum was inserted in the appropriate version and inclination and obtained a good press-fit. A screw was placed in the ilium for supplementary fixation and obtained excellent purchase.   A highly cross-

## 2021-07-09 NOTE — PROGRESS NOTES
[facility] Pharmacy Note: Antimicrobial Weight Based Dose Adjustment for:    Shakir Champion is a 76year old patient who has been prescribed vancomycin (VANCOCIN) 1750 mg x1 post-op.     Crcl 83.8 mL/min using Scr 6/15/21 0.99 mg/dl rounded to Scr 1.0  Wt Read

## 2021-07-09 NOTE — ANESTHESIA PREPROCEDURE EVALUATION
Anesthesia PreOp Note    HPI:     Adelina Barbosa is a 76year old male who presents for preoperative consultation requested by: Buck Lubin MD    Date of Surgery: 7/9/2021    Procedure(s):  left total hip anterior approach  Indication: oste Date Noted: 06/30/2015      High myopia, bilateral         Date Noted: 06/30/2015      Amblyopia, left         Date Noted: 06/30/2015      Esophageal reflux         Date Noted: 11/13/2013      Ex-smoker         Date Noted: 08/22/2013      Atheroscler Ophthalmic Solution, INSTILL 1 DROP BY OPHTHALMIC ROUTE EVERY NIGHT INTO BOTH EYES, Disp: 3 Bottle, Rfl: 3, 7/7/2021  amLODIPine Besylate 10 MG Oral Tab, Take 1 tablet (10 mg total) by mouth daily. , Disp: 90 tablet, Rfl: 1, 7/9/2021 at 0630  losartan 100 M killed in steel mill accident   • Breast Cancer Cousin    • Lipids Neg         hyperlipidemia   • Glaucoma Neg    • Macular degeneration Neg      Social History    Socioeconomic History      Marital status:       Spouse name: Not on file      [place] Stress :   Social Connections:       Frequency of Communication with Friends and Family:       Frequency of Social Gatherings with Friends and Family:       Attends [employer] Services:       Active Member of Clubs or Organizations:       Attends Club or Or general discussed  Informed Consent Plan and Risks Discussed With:  Patient  Use of Blood Products Discussed With:  Patient      I have informed Nya Varner and/or legal guardian or family member of the nature of the anesthetic plan, benefits, risks

## 2021-07-09 NOTE — BRIEF OP NOTE
Pre-Operative Diagnosis: osteoarthritis left hip     Post-Operative Diagnosis: osteoarthritis left hip      Procedure Performed:   left total hip anterior approach    Surgeon(s) and Role:     * Ramu Wright MD - Primary    Assistant(s):  Joi Davis

## 2021-07-09 NOTE — INTERVAL H&P NOTE
Pre-op Diagnosis: osteoarthritis left hip    The above referenced H&P was reviewed by Eric Antonio MD on 7/9/2021, the patient was examined and no significant changes have occurred in the patient's condition since the H&P was performed.   I discus

## 2021-07-09 NOTE — ANESTHESIA POSTPROCEDURE EVALUATION
Patient: Edvin Saha    Procedure Summary     Date: 07/09/21 Room / Location: [address] [place] [address] MAIN OR 09 / [facility] OR    Anesthesia Start: 2094 Anesthesia Stop:     Procedure: left total hip anterior approach (Left Hip) Diagnosis: (osteoarthritis left hip)

## 2021-07-09 NOTE — PROGRESS NOTES
[facility]    Progress Note    Clay Gr Patient Status:  Outpatient in a Bed    1955 MRN [id number]   Location [address] [facility] Attending Kinjal Downey, *   Hosp Day # 0 PCP Neha Anne HCT 40.9 06/15/2021    .0 06/15/2021    CREATSERUM 0.99 06/15/2021    BUN 9 06/15/2021     06/15/2021    K 3.8 06/15/2021     06/15/2021    CO2 26.0 06/15/2021    GLU 56 (L) 06/15/2021    CA 8.6 06/15/2021    ALB 4.1 06/15/2021    ALK

## 2021-07-09 NOTE — ANESTHESIA PROCEDURE NOTES
Spinal Block    Date/Time: 7/9/2021 12:50 PM  Performed by: Sampson Camacho CRNA  Authorized by: Osiris Amaro MD       General Information and Staff    Start Time:  7/9/2021 12:44 PM  End Time:  7/9/2021 12:50 PM  Anesthesiologist:  Osiris Amaro MD  P

## 2021-07-10 VITALS
BODY MASS INDEX: 38.41 KG/M2 | TEMPERATURE: 98 F | DIASTOLIC BLOOD PRESSURE: 64 MMHG | OXYGEN SATURATION: 93 % | SYSTOLIC BLOOD PRESSURE: 143 MMHG | HEART RATE: 101 BPM | RESPIRATION RATE: 18 BRPM | WEIGHT: 239 LBS | HEIGHT: 66 IN

## 2021-07-10 LAB
DEPRECATED RDW RBC AUTO: 52.6 FL (ref 35.1–46.3)
ERYTHROCYTE [DISTWIDTH] IN BLOOD BY AUTOMATED COUNT: 14.6 % (ref 11–15)
GLUCOSE BLDC GLUCOMTR-MCNC: 132 MG/DL (ref 70–99)
HCT VFR BLD AUTO: 39.3 %
HGB BLD-MCNC: 12.3 G/DL
MCH RBC QN AUTO: 30.4 PG (ref 26–34)
MCHC RBC AUTO-ENTMCNC: 31.3 G/DL (ref 31–37)
MCV RBC AUTO: 97.3 FL
PLATELET # BLD AUTO: 267 10(3)UL (ref 150–450)
RBC # BLD AUTO: 4.04 X10(6)UL
WBC # BLD AUTO: 9.4 X10(3) UL (ref 4–11)

## 2021-07-10 PROCEDURE — 99214 OFFICE O/P EST MOD 30 MIN: CPT | Performed by: HOSPITALIST

## 2021-07-10 RX ORDER — PSEUDOEPHEDRINE HCL 30 MG
100 TABLET ORAL 2 TIMES DAILY PRN
Qty: 20 CAPSULE | Refills: 0 | Status: SHIPPED | OUTPATIENT
Start: 2021-07-10 | End: 2021-08-27

## 2021-07-10 RX ORDER — ACETAMINOPHEN 325 MG/1
650 TABLET ORAL EVERY 6 HOURS PRN
Qty: 1 TABLET | Refills: 0 | Status: SHIPPED | COMMUNITY
Start: 2021-07-10 | End: 2021-08-27

## 2021-07-10 RX ORDER — MELATONIN
325
Qty: 30 TABLET | Refills: 0 | Status: SHIPPED | OUTPATIENT
Start: 2021-07-10 | End: 2021-08-27

## 2021-07-10 RX ORDER — HYDROCODONE BITARTRATE AND ACETAMINOPHEN 5; 325 MG/1; MG/1
1 TABLET ORAL EVERY 4 HOURS PRN
Qty: 42 TABLET | Refills: 0 | Status: SHIPPED | OUTPATIENT
Start: 2021-07-10 | End: 2021-08-27

## 2021-07-10 NOTE — ANESTHESIA POST-OP FOLLOW-UP NOTE
Mr. Luzma Benton is POD#1 after his left total hip replacement performed under spinal anesthesia. Denies back pain, or residual LE weakness/numbness. Was able to ambulate to the bathroom this morning.  States he has a mild headache, not positional. Abl

## 2021-07-10 NOTE — DISCHARGE SUMMARY
Dc summary#[id number]  >30 min spent on [address] Discharge Diagnoses: olga    Lace+ Score: 33  59-90 High Risk  29-58 Medium Risk  0-28   Low Risk.     TCM Follow-Up Recommendation:  LACE < 29: Low Risk of readmission after discharge from the hospital. N

## 2021-07-10 NOTE — HOME CARE LIAISON
Received referral via [facility] for [facility] services. [facility]idential already reserved in [address] by STEPHANIE. Spoke w/ pt in regards to [facility]. Pt agreeable with HH and choice is [facility]. Informed pt of financial interest. Notified STEPHANIE/Scarlet.

## 2021-07-10 NOTE — CM/SW NOTE
Received MDO for post surgical. Plan for discharge this afternoon. PT recommending home health. Spoke w/ patient for assessment. Patient, wife Anupam Maura & their son & dtr live in a 2 level home w/ 10 stairs to the bedroom & 2 EDITH.  Patient owns a cane, show

## 2021-07-10 NOTE — PHYSICAL THERAPY NOTE
PHYSICAL THERAPY HIP EVALUATION - INPATIENT     Room Number: Room 2/Room 2-A  Evaluation Date: 7/10/2021  Type of Evaluation: Initial  Physician Order: PT Eval and Treat    Presenting Problem: L KAVITA  Reason for Therapy: Mobility Dysfunction and Discharge P Recommendations: Home with home health PT    PLAN  PT Treatment Plan: Bed mobility; Body mechanics; Endurance; Energy conservation;Patient education;Gait training;Family education; Neuromuscular re-educate;Strengthening;Range of motion;Stoop training;Stair tra Equipment: Cane       Prior Level of [place]: mod I with straight cane    SUBJECTIVE  \"I am really excited to get back on my glider. \"     PHYSICAL THERAPY EXAMINATION     OBJECTIVE  Precautions: KAVITA - anterior;Limb alert - left  Fall Risk: Standard with SBA    Exercise/Education Provided:  Bed mobility  Body mechanics  Energy conservation  Functional activity tolerated  Gait training  Neuromuscular re-educate  Posture  ROM  Strengthening  Lower therapeutic exercise:   Ankle pumps  Hip AB/AD  Knee exte

## 2021-07-10 NOTE — PROGRESS NOTES
[facility]    Progress Note    Petros Nair Patient Status:  Outpatient in a Bed    1955 MRN [id number]   [facility] Attending Ni Rodriguez Day # 0 PCP Trenton Tom MD       Subjective: ESRML 9 01/11/2018    B12 401 01/04/2021       XR FLUOROSCOPY C-ARM TIME <1 HOUR  (CPT=76000)    Result Date: 7/9/2021  CONCLUSION:  1. Left hip arthroplasty.     Dictated by (CST): Ivett García MD on 7/09/2021 at 3:55 PM     Finalized by (CST): Samantha Graff

## 2021-07-10 NOTE — OCCUPATIONAL THERAPY NOTE
OCCUPATIONAL THERAPY EVALUATION - INPATIENT     Room Number: Room 2/Room 2-A  Evaluation Date: 7/10/2021  Type of Evaluation: Initial  Presenting Problem: L KAVITA- anterior    Physician Order: IP Consult to Occupational Therapy  Reason for Therapy: ADL/IAD Recommendations: Home  OT Device Recommendations: None    PLAN    Patient has been evaluated and presents with no skilled Occupational Therapy needs  at this time. Patient will be discharged from Occupational Therapy services.  Please re-order if a new fun shoehorn;Long-handled sponge    Occupation/Status: retired  Hand Dominance: Right  Drives: Yes  Patient Regularly Uses: Glasses    Use of Assistive Device(s): none    Prior Level of [place]: Prior to admission, patient was independent with all ADLs an such as brushing teeth?: None  -   Eating meals?: None    AM-PAC Score:  Score: 21  Approx Degree of Impairment: 32.79%  Standardized Score (AM-PAC Scale): 44.27  CMS Modifier (G-Code): CJ    FUNCTIONAL TRANSFER ASSESSMENT  Supine to Sit : Not tested  Sit

## 2021-07-11 DIAGNOSIS — E11.42 CONTROLLED TYPE 2 DIABETES MELLITUS WITH DIABETIC POLYNEUROPATHY, WITHOUT LONG-TERM CURRENT USE OF INSULIN (HCC): ICD-10-CM

## 2021-07-11 RX ORDER — METFORMIN HYDROCHLORIDE 500 MG/1
TABLET, EXTENDED RELEASE ORAL
Qty: 360 TABLET | Refills: 0 | Status: SHIPPED | OUTPATIENT
Start: 2021-07-11 | End: 2021-08-09

## 2021-07-12 NOTE — DISCHARGE SUMMARY
[facility]    PATIENT'S NAME: Antonella Likeley   ATTENDING PHYSICIAN: Suzan Rodriguez MD   PATIENT ACCOUNT#:   [id number]    LOCATION:   ROOM 2 [facility]  MEDICAL RECORD #:   [id number]       YOB: 1955  ADMISSION DATE:       07 Hypercholesterolemia. Continue medication. 5.   Type 2 diabetes with diabetic polyneuropathy. Continue medication. 6.   Mild postoperative anemia, stable. CONDITION ON DISCHARGE:  Stable. CODE STATUS:  Full code.     DISCHARGE MEDICATIONS:    1

## 2021-07-13 DIAGNOSIS — K21.9 GASTROESOPHAGEAL REFLUX DISEASE: ICD-10-CM

## 2021-07-13 RX ORDER — OMEPRAZOLE 20 MG/1
CAPSULE, DELAYED RELEASE ORAL
Qty: 90 CAPSULE | Refills: 3 | Status: SHIPPED | OUTPATIENT
Start: 2021-07-13

## 2021-07-19 DIAGNOSIS — E78.00 HYPERCHOLESTEROLEMIA: ICD-10-CM

## 2021-07-19 DIAGNOSIS — E11.65 UNCONTROLLED TYPE 2 DIABETES MELLITUS WITH HYPERGLYCEMIA, WITHOUT LONG-TERM CURRENT USE OF INSULIN (HCC): ICD-10-CM

## 2021-07-19 RX ORDER — PRAVASTATIN SODIUM 10 MG
TABLET ORAL
Qty: 90 TABLET | Refills: 1 | Status: SHIPPED | OUTPATIENT
Start: 2021-07-19

## 2021-07-19 RX ORDER — DAPAGLIFLOZIN 5 MG/1
TABLET, FILM COATED ORAL
Qty: 90 TABLET | Refills: 0 | Status: SHIPPED | OUTPATIENT
Start: 2021-07-19 | End: 2021-11-05

## 2021-07-29 ENCOUNTER — LAB REQUISITION (OUTPATIENT)
Dept: LAB | Facility: HOSPITAL | Age: 66
End: 2021-07-29
Payer: COMMERCIAL

## 2021-07-29 DIAGNOSIS — T84.84XA PAIN DUE TO INTERNAL ORTHOPEDIC PROSTHETIC DEVICES, IMPLANTS AND GRAFTS, INITIAL ENCOUNTER (HCC): ICD-10-CM

## 2021-07-29 PROCEDURE — 89051 BODY FLUID CELL COUNT: CPT | Performed by: ORTHOPAEDIC SURGERY

## 2021-07-29 PROCEDURE — 87205 SMEAR GRAM STAIN: CPT | Performed by: ORTHOPAEDIC SURGERY

## 2021-07-29 PROCEDURE — 89060 EXAM SYNOVIAL FLUID CRYSTALS: CPT | Performed by: ORTHOPAEDIC SURGERY

## 2021-07-29 PROCEDURE — 87070 CULTURE OTHR SPECIMN AEROBIC: CPT | Performed by: ORTHOPAEDIC SURGERY

## 2021-07-30 LAB
BASOPHILS NFR SNV: 1 %
COLOR FLD: YELLOW
EOSINOPHIL NFR SNV: 0 %
LYMPHOCYTES NFR SNV: 92 %
MONOS+MACROS NFR SNV: 7 %
NEUTROPHILS NFR FLD: 0 %
RBC # FLD: 3825 /CUMM (ref ?–1)
TOTAL CELLS COUNTED: 100
WBC # FLD: 4765 /CUMM (ref 0–200)

## 2021-08-02 RX ORDER — BLOOD SUGAR DIAGNOSTIC
STRIP MISCELLANEOUS
Qty: 200 STRIP | Refills: 5 | Status: SHIPPED | OUTPATIENT
Start: 2021-08-02 | End: 2021-12-16

## 2021-08-02 NOTE — TELEPHONE ENCOUNTER
Refill passed per [facility] protocol.      Requested Prescriptions   Pending Prescriptions Disp Refills    Glucose Blood (ACCU-CHEK GUIDE) In Vitro Strip 300 strip 11     Sig: To monitor blood sugar three times daily        Diabetic Supplies Protocol Passed - 8/2/2021  6:18 PM        Passed - Appointment in past 12 or next 3 months               Recent Outpatient Visits              1 month ago Pre-op exam    Tim Noguera, Pita Thorne MD    Office Visit    1 month ago Primary open angle glaucoma (POAG) of both eyes, moderate stage    [facility] Yeison Moralez MD    Office Visit    3 months ago Essential hypertension    [facility], [address], Pita Thorne MD    Office Visit    3 months ago Primary osteoarthritis of left hip    JDiane Tang MD    Office Visit    4 months ago Primary osteoarthritis of left hip    [facility] Lalito Tang MD    Office Visit             Future Appointments         Provider Department Appt Notes    In 1 month Abhi Curtis MD [facility], [address] follow up    In 2 months Yeison Moralez MD [facility] EP/ DM EE and photos

## 2021-08-02 NOTE — TELEPHONE ENCOUNTER
Refill passed per [facility] protocol.      Requested Prescriptions   Pending Prescriptions Disp Refills    Glucose Blood (ACCU-CHEK GUIDE) In Vitro Strip 300 strip 11     Sig: To monitor blood sugar three times daily        Diabetic Supplies Protocol Passed - 8/2/2021  6:18 PM        Passed - Appointment in past 12 or next 3 months               Recent Outpatient Visits              1 month ago Pre-op exam    Vanessa Pinzon MD    Office Visit    1 month ago Primary open angle glaucoma (POAG) of both eyes, moderate stage    [facility] Pablo Dubon MD    Office Visit    3 months ago Essential hypertension    [facility], [address], Vanessa Espitia MD    Office Visit    3 months ago Primary osteoarthritis of left hip    [facility] Winsome Lynch MD    Office Visit    4 months ago Primary osteoarthritis of left hip    [facility] Winsome Lynch MD    Office Visit             Future Appointments         Provider Department Appt Notes    In 1 month Heath Bauer MD [facility], [facility], [address] follow up    In 2 months Pablo Dubon MD [facility] EP/ DM EE and photos

## 2021-08-06 NOTE — PATIENT INSTRUCTIONS
[facility] PHYSICAL REHABILITATION  [address] [place] [address], [address]     INPATIENT REHABILITATION  DAILY PROGRESS NOTE     Date: 8/6/2021    Name: Ernestine Flanagan Age / Sex: 76 y.o. / female   CSN: [id number] MRN: [id number]   [facility] Low iron/colon screening/history of GERD   - colonoscopy + EGD   - Colyte   - MAC sedation  - be careful with the Aleve   - stay on omeprazole for now [facility]) (John Hager MD    August 6, 2021

## 2021-08-09 DIAGNOSIS — E11.42 CONTROLLED TYPE 2 DIABETES MELLITUS WITH DIABETIC POLYNEUROPATHY, WITHOUT LONG-TERM CURRENT USE OF INSULIN (HCC): ICD-10-CM

## 2021-08-09 RX ORDER — METFORMIN HYDROCHLORIDE 500 MG/1
2000 TABLET, EXTENDED RELEASE ORAL
Qty: 360 TABLET | Refills: 1 | Status: SHIPPED | OUTPATIENT
Start: 2021-08-09

## 2021-08-10 NOTE — TELEPHONE ENCOUNTER
[facility]   Requested Prescriptions   Pending Prescriptions Disp Refills    metFORMIN HCl  MG Oral Tablet 24 Hr 360 tablet 0     Sig: Take 4 tablets (2,000 mg total) by mouth daily with breakfast.        Diabetes Medi

## 2021-08-20 DIAGNOSIS — I10 ESSENTIAL HYPERTENSION: ICD-10-CM

## 2021-08-21 RX ORDER — LOSARTAN POTASSIUM 100 MG/1
TABLET ORAL
Qty: 90 TABLET | Refills: 0 | Status: SHIPPED | OUTPATIENT
Start: 2021-08-21 | End: 2021-11-14

## 2021-08-23 DIAGNOSIS — L28.0 LICHEN SIMPLEX CHRONICUS: ICD-10-CM

## 2021-08-23 RX ORDER — CLOBETASOL PROPIONATE 0.5 MG/G
CREAM TOPICAL
Qty: 45 G | Refills: 0 | Status: SHIPPED | OUTPATIENT
Start: 2021-08-23 | End: 2021-10-07

## 2021-08-23 NOTE — TELEPHONE ENCOUNTER
Please review. Protocol Failed / No Protocol.     Requested Prescriptions   Pending Prescriptions Disp Refills    Clobetasol Propionate 0.05 % External Cream 45 g 0     Sig: APPLY TO ANKLE TWICE A DAY        There is no refill protocol information for this

## 2021-08-27 ENCOUNTER — TELEPHONE (OUTPATIENT)
Dept: INTERNAL MEDICINE CLINIC | Facility: CLINIC | Age: 66
End: 2021-08-27

## 2021-09-28 ENCOUNTER — OFFICE VISIT (OUTPATIENT)
Dept: INTERNAL MEDICINE CLINIC | Facility: CLINIC | Age: 66
End: 2021-09-28
Payer: COMMERCIAL

## 2021-09-28 VITALS
HEIGHT: 66 IN | WEIGHT: 244 LBS | RESPIRATION RATE: 18 BRPM | TEMPERATURE: 97 F | DIASTOLIC BLOOD PRESSURE: 75 MMHG | BODY MASS INDEX: 39.21 KG/M2 | SYSTOLIC BLOOD PRESSURE: 126 MMHG | HEART RATE: 58 BPM

## 2021-09-28 DIAGNOSIS — Z00.00 ROUTINE HEALTH MAINTENANCE: ICD-10-CM

## 2021-09-28 DIAGNOSIS — E11.42 TYPE 2 DIABETES MELLITUS WITH DIABETIC POLYNEUROPATHY, WITHOUT LONG-TERM CURRENT USE OF INSULIN (HCC): ICD-10-CM

## 2021-09-28 DIAGNOSIS — I10 ESSENTIAL HYPERTENSION: ICD-10-CM

## 2021-09-28 DIAGNOSIS — Z87.891 HISTORY OF NICOTINE DEPENDENCE: ICD-10-CM

## 2021-09-28 DIAGNOSIS — R21 RASH: Primary | ICD-10-CM

## 2021-09-28 DIAGNOSIS — Z23 NEED FOR VACCINATION: ICD-10-CM

## 2021-09-28 PROCEDURE — 3074F SYST BP LT 130 MM HG: CPT | Performed by: INTERNAL MEDICINE

## 2021-09-28 PROCEDURE — 99214 OFFICE O/P EST MOD 30 MIN: CPT | Performed by: INTERNAL MEDICINE

## 2021-09-28 PROCEDURE — 3008F BODY MASS INDEX DOCD: CPT | Performed by: INTERNAL MEDICINE

## 2021-09-28 PROCEDURE — 3078F DIAST BP <80 MM HG: CPT | Performed by: INTERNAL MEDICINE

## 2021-09-28 RX ORDER — ASPIRIN 81 MG/1
81 TABLET ORAL DAILY
COMMUNITY

## 2021-09-28 NOTE — PROGRESS NOTES
HPI:    Patient ID: Alejandro Cross is a 78year old male. HPI:  Pt still has a rash, even though he stopped the cymbalta. His sugars are high in the morning and then sometimes they drop to 60. His hip feels good.   He would like to do lung cancer s MOUTH EVERY DAY AT NIGHT 90 tablet 1   • OMEPRAZOLE 20 MG Oral Capsule Delayed Release TAKE 1 CAPSULE BY MOUTH EVERY DAY IN THE MORNING 90 capsule 3   • glimepiride 4 MG Oral Tab Take 1 tablet (4 mg total) by mouth daily with breakfast. 90 tablet 1   • LAT note reviewed. Constitutional:       Appearance: He is well-developed. Cardiovascular:      Rate and Rhythm: Normal rate and regular rhythm. Heart sounds: Normal heart sounds. No murmur heard.       Pulmonary:      Effort: Pulmonary effort is jai

## 2021-09-28 NOTE — PATIENT INSTRUCTIONS
Please schedule your next appointment in early January. Do fasting labs 2-4 days before that appointment (after January 4th)  Fast for 12 hours. Water and meds are okay.    Do not take vitamins or supplements for 3 days prior to the blood test.

## 2021-09-28 NOTE — ASSESSMENT & PLAN NOTE
Pt would like to do the CT lung screening. I advised the pt that it may detect lung cancer, hopefully at a treatable stage. Pt expressed understanding and wishes to proceed. Start Regimen: betamethasone valerate 0.12 % topical foam. Apply to scalp once daily PRN, Never to face, body folds or genitals Action 2: Continue Otc Regimen: Tarsum Detail Level: Zone

## 2021-10-07 ENCOUNTER — OFFICE VISIT (OUTPATIENT)
Dept: OPHTHALMOLOGY | Facility: CLINIC | Age: 66
End: 2021-10-07
Payer: COMMERCIAL

## 2021-10-07 DIAGNOSIS — E11.9 DIABETES MELLITUS TYPE 2 WITHOUT RETINOPATHY (HCC): ICD-10-CM

## 2021-10-07 DIAGNOSIS — H53.002 AMBLYOPIA, LEFT: ICD-10-CM

## 2021-10-07 DIAGNOSIS — H25.13 AGE-RELATED NUCLEAR CATARACT OF BOTH EYES: ICD-10-CM

## 2021-10-07 DIAGNOSIS — H40.1132 PRIMARY OPEN ANGLE GLAUCOMA (POAG) OF BOTH EYES, MODERATE STAGE: Primary | ICD-10-CM

## 2021-10-07 DIAGNOSIS — H43.393 FLOATER, VITREOUS, BILATERAL: ICD-10-CM

## 2021-10-07 DIAGNOSIS — L28.0 LICHEN SIMPLEX CHRONICUS: ICD-10-CM

## 2021-10-07 PROCEDURE — 92015 DETERMINE REFRACTIVE STATE: CPT | Performed by: OPHTHALMOLOGY

## 2021-10-07 PROCEDURE — 92250 FUNDUS PHOTOGRAPHY W/I&R: CPT | Performed by: OPHTHALMOLOGY

## 2021-10-07 PROCEDURE — 92014 COMPRE OPH EXAM EST PT 1/>: CPT | Performed by: OPHTHALMOLOGY

## 2021-10-07 RX ORDER — CLOBETASOL PROPIONATE 0.5 MG/G
CREAM TOPICAL
Qty: 45 G | Refills: 0 | Status: SHIPPED | OUTPATIENT
Start: 2021-10-07 | End: 2021-11-05

## 2021-10-07 NOTE — ASSESSMENT & PLAN NOTE
New glasses today to update as needed. Per Dr. Kira Gutierrez, he changed the prescription in the left eye to -12.50  due to history of amblyopia and because the added minus did not improve vision much.

## 2021-10-07 NOTE — ASSESSMENT & PLAN NOTE
Intraocular pressure stable, tolerating medications. Will continue same regimen of Latanoprost at bedtime in both eyes. Photos were taken today to document optic nerves. Will have patient back in 4 months for a pressure check.

## 2021-10-07 NOTE — PATIENT INSTRUCTIONS
Primary open angle glaucoma (POAG) of both eyes, moderate stage  Intraocular pressure stable, tolerating medications. Will continue same regimen of Latanoprost at bedtime in both eyes. Photos were taken today to document optic nerves.        Will have p

## 2021-10-07 NOTE — TELEPHONE ENCOUNTER
Please review; protocol failed/no protocol    Patient has not yet scheduled appt with Derm    Requested Prescriptions   Pending Prescriptions Disp Refills    clobetasol 0.05 % External Cream 45 g 0     Sig: APPLY TO ANKLE TWICE A DAY        There is no ref

## 2021-10-07 NOTE — PROGRESS NOTES
Ladarius Foster is a 78year old male.     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 Edward Aguilar due to history of amblyopia and added  minus does not improve vision much           Final Rx       [facility] [place] [facility] [place]

## 2021-10-10 DIAGNOSIS — I10 ESSENTIAL HYPERTENSION: ICD-10-CM

## 2021-10-11 RX ORDER — AMLODIPINE BESYLATE 10 MG/1
TABLET ORAL
Qty: 90 TABLET | Refills: 1 | Status: SHIPPED | OUTPATIENT
Start: 2021-10-11

## 2021-11-04 ENCOUNTER — HOSPITAL ENCOUNTER (OUTPATIENT)
Age: 66
Discharge: HOME | End: 2021-11-04
Attending: PHYSICIAN ASSISTANT
Payer: COMMERCIAL

## 2021-11-04 VITALS
DIASTOLIC BLOOD PRESSURE: 69 MMHG | RESPIRATION RATE: 18 BRPM | HEIGHT: 66 IN | SYSTOLIC BLOOD PRESSURE: 148 MMHG | HEART RATE: 67 BPM | WEIGHT: 240 LBS | OXYGEN SATURATION: 98 % | BODY MASS INDEX: 38.57 KG/M2 | TEMPERATURE: 98 F

## 2021-11-04 DIAGNOSIS — L03.012 PARONYCHIA OF FINGER OF LEFT HAND: Primary | ICD-10-CM

## 2021-11-04 DIAGNOSIS — R03.0 ELEVATED BLOOD PRESSURE READING: ICD-10-CM

## 2021-11-04 PROCEDURE — 99213 OFFICE O/P EST LOW 20 MIN: CPT | Performed by: PHYSICIAN ASSISTANT

## 2021-11-04 PROCEDURE — 10060 I&D ABSCESS SIMPLE/SINGLE: CPT | Performed by: PHYSICIAN ASSISTANT

## 2021-11-04 RX ORDER — CEPHALEXIN 500 MG/1
500 CAPSULE ORAL 3 TIMES DAILY
Qty: 21 CAPSULE | Refills: 0 | Status: SHIPPED | OUTPATIENT
Start: 2021-11-04 | End: 2021-11-11

## 2021-11-05 DIAGNOSIS — L28.0 LICHEN SIMPLEX CHRONICUS: ICD-10-CM

## 2021-11-05 DIAGNOSIS — E11.65 UNCONTROLLED TYPE 2 DIABETES MELLITUS WITH HYPERGLYCEMIA, WITHOUT LONG-TERM CURRENT USE OF INSULIN (HCC): ICD-10-CM

## 2021-11-05 RX ORDER — CLOBETASOL PROPIONATE 0.5 MG/G
CREAM TOPICAL
Qty: 45 G | Refills: 0 | Status: SHIPPED | OUTPATIENT
Start: 2021-11-05

## 2021-11-05 RX ORDER — CLOBETASOL PROPIONATE 0.5 MG/G
CREAM TOPICAL
Qty: 45 G | Refills: 0 | OUTPATIENT
Start: 2021-11-05

## 2021-11-06 NOTE — TELEPHONE ENCOUNTER
Refill passed per [address] [facility] protocol. Requested Prescriptions   Pending Prescriptions Disp Refills    dapagliflozin (FARXIGA) 5 MG Oral Tab 90 tablet 0     Sig: Take 1 tablet (5 mg total) by mouth daily.         Diabetes Medication Protocol Passed - 11/5/2021  6:19 PM        Passed - Last A1C < 7.5 and within past 6 months     Lab Results   Component Value Date    A1C 6.9 (H) 06/15/2021               Passed - Appointment in past 6 or next 3 months        Passed - GFR  > 50     Lab Results   Component Value Date    GFRAA 91 06/15/2021                 Passed - GFR in the past 12 months                Recent Outpatient Visits              1 month ago Primary open angle glaucoma (POAG) of both eyes, moderate stage    [facility] Estell Koyanagi, MD    Office Visit    1 month ago [place] [address], Roxanne Oneil MD    Office Visit    4 months ago Pre-op exam    [facility], Roxanne Oneil MD    Office Visit    4 months ago Primary open angle glaucoma (POAG) of both eyes, moderate stage    [facility] Estell Koyanagi, MD    Office Visit    6 months ago Essential hypertension    [address] [facility], [address], Roxanne Oneil MD    Office Visit             Future Appointments         Provider Department Appt Notes    In 1 week Ludwig Quintero PA-C [facility] new** rash     In 2 months Jerad Ureña MD [address] [facility], [address], [place]    In 3 months Estell Koyanagi, MD [facility] EP/ 4 mos IOP

## 2021-11-14 DIAGNOSIS — I10 ESSENTIAL HYPERTENSION: ICD-10-CM

## 2021-11-14 RX ORDER — LOSARTAN POTASSIUM 100 MG/1
TABLET ORAL
Qty: 90 TABLET | Refills: 0 | Status: SHIPPED | OUTPATIENT
Start: 2021-11-14

## 2021-11-17 ENCOUNTER — OFFICE VISIT (OUTPATIENT)
Dept: DERMATOLOGY CLINIC | Facility: CLINIC | Age: 66
End: 2021-11-17
Payer: COMMERCIAL

## 2021-11-17 DIAGNOSIS — L43.9 LICHEN PLANUS: Primary | ICD-10-CM

## 2021-11-17 PROCEDURE — 11102 TANGNTL BX SKIN SINGLE LES: CPT | Performed by: PHYSICIAN ASSISTANT

## 2021-11-17 PROCEDURE — 88305 TISSUE EXAM BY PATHOLOGIST: CPT | Performed by: PHYSICIAN ASSISTANT

## 2021-11-17 PROCEDURE — 99203 OFFICE O/P NEW LOW 30 MIN: CPT | Performed by: PHYSICIAN ASSISTANT

## 2021-11-17 RX ORDER — LEVOCETIRIZINE DIHYDROCHLORIDE 5 MG/1
5 TABLET, FILM COATED ORAL DAILY
Qty: 30 TABLET | Refills: 3 | Status: SHIPPED | OUTPATIENT
Start: 2021-11-17

## 2021-11-17 RX ORDER — TACROLIMUS 1 MG/G
1 OINTMENT TOPICAL 2 TIMES DAILY
Qty: 60 G | Refills: 3 | Status: SHIPPED | OUTPATIENT
Start: 2021-11-17

## 2021-11-17 RX ORDER — CLOBETASOL PROPIONATE 0.5 MG/G
1 OINTMENT TOPICAL DAILY PRN
Qty: 60 G | Refills: 1 | Status: SHIPPED | OUTPATIENT
Start: 2021-11-17

## 2021-11-17 NOTE — PROCEDURES
Procedure: Shave biopsy     With the patient is a supine position, the skin was scrubbed with alcohol. Anesthesia was obtained by injecting 2 mL of 1% Xylocaine with Epinephrine.  The skin surrounding the lesion on the right arm was placed under tension an

## 2021-11-17 NOTE — PROGRESS NOTES
HPI:    Patient ID: Nydia Forbes is a 78year old male. Patient presents for rash on arms and left foot for the past several months. The skin is dry, itchy and rash tends to blister as well. Started after taking cymbalta.  He does have the rash on MORNING 90 capsule 3   • glimepiride 4 MG Oral Tab Take 1 tablet (4 mg total) by mouth daily with breakfast. 90 tablet 1   • LATANOPROST 0.005 % Ophthalmic Solution INSTILL 1 DROP BY OPHTHALMIC ROUTE EVERY NIGHT INTO BOTH EYES 3 Bottle 3   • Metoprolol Suc Apply to affected areas 1x/day as needed   • levocetirizine (XYZAL) 5 MG Oral Tab 30 tablet 3     Sig: Take 1 tablet (5 mg total) by mouth daily.    • tacrolimus (PROTOPIC) 0.1 % External Ointment 60 g 3     Sig: Apply 1 Application topically 2 (two) times

## 2021-11-24 DIAGNOSIS — E11.65 UNCONTROLLED TYPE 2 DIABETES MELLITUS WITH HYPERGLYCEMIA, WITHOUT LONG-TERM CURRENT USE OF INSULIN (HCC): ICD-10-CM

## 2021-11-24 RX ORDER — GLIMEPIRIDE 4 MG/1
4 TABLET ORAL
Qty: 90 TABLET | Refills: 1 | Status: SHIPPED | OUTPATIENT
Start: 2021-11-24 | End: 2022-01-11

## 2021-11-24 NOTE — TELEPHONE ENCOUNTER
Refill passed per [facility] protocol   Requested Prescriptions   Pending Prescriptions Disp Refills    GLIMEPIRIDE 4 MG Oral Tab [Pharmacy Med Name: GLIMEPIRIDE 4 MG TABLET] 90 tablet 1     Sig: TAKE 1 TABLET BY MOUTH DAILY WITH BREAKFAST        Diab

## 2021-11-30 ENCOUNTER — LAB ENCOUNTER (OUTPATIENT)
Dept: LAB | Age: 66
End: 2021-11-30
Attending: INTERNAL MEDICINE
Payer: COMMERCIAL

## 2021-11-30 DIAGNOSIS — Z00.00 ROUTINE HEALTH MAINTENANCE: ICD-10-CM

## 2021-11-30 DIAGNOSIS — E11.42 TYPE 2 DIABETES MELLITUS WITH DIABETIC POLYNEUROPATHY, WITHOUT LONG-TERM CURRENT USE OF INSULIN (HCC): ICD-10-CM

## 2021-11-30 PROCEDURE — 36415 COLL VENOUS BLD VENIPUNCTURE: CPT

## 2021-11-30 PROCEDURE — 82607 VITAMIN B-12: CPT

## 2021-11-30 PROCEDURE — 84443 ASSAY THYROID STIM HORMONE: CPT

## 2021-11-30 PROCEDURE — 80061 LIPID PANEL: CPT

## 2021-11-30 PROCEDURE — 85025 COMPLETE CBC W/AUTO DIFF WBC: CPT

## 2021-11-30 PROCEDURE — 80053 COMPREHEN METABOLIC PANEL: CPT

## 2021-11-30 PROCEDURE — 82570 ASSAY OF URINE CREATININE: CPT

## 2021-11-30 PROCEDURE — 83036 HEMOGLOBIN GLYCOSYLATED A1C: CPT

## 2021-11-30 PROCEDURE — 82043 UR ALBUMIN QUANTITATIVE: CPT

## 2021-12-04 ENCOUNTER — IMMUNIZATION (OUTPATIENT)
Dept: LAB | Facility: HOSPITAL | Age: 66
End: 2021-12-04
Attending: EMERGENCY MEDICINE
Payer: COMMERCIAL

## 2021-12-04 DIAGNOSIS — Z23 NEED FOR VACCINATION: Primary | ICD-10-CM

## 2021-12-04 PROCEDURE — 0064A SARSCOV2 VAC 50MCG/0.25ML IM: CPT

## 2021-12-16 RX ORDER — BLOOD SUGAR DIAGNOSTIC
STRIP MISCELLANEOUS
Qty: 300 STRIP | Refills: 3 | Status: SHIPPED | OUTPATIENT
Start: 2021-12-16 | End: 2023-01-11

## 2021-12-16 RX ORDER — LANCETS
EACH MISCELLANEOUS
Qty: 300 EACH | Refills: 3 | Status: SHIPPED | OUTPATIENT
Start: 2021-12-16

## 2021-12-16 NOTE — TELEPHONE ENCOUNTER
Refill passed per [facility] protocol.      Requested Prescriptions   Pending Prescriptions Disp Refills    Accu-Chek FastClix Lancets Does not apply Misc 300 each 11     Sig: To monitor blood sugar three times daily        Diabetic Supplies Protocol Passed - 12/16/2021 12:26 PM        Passed - Appointment in past 12 or next 3 months           Glucose Blood (ACCU-CHEK GUIDE) In Vitro Strip 200 strip 5     Sig: To monitor blood sugar three times daily        Diabetic Supplies Protocol Passed - 12/16/2021 12:26 PM        Passed - Appointment in past 12 or next 3 months                Recent Outpatient Visits              4 weeks ago Lichen planus    [facility] D[facility] Gabriel Kessler PA-C    Office Visit    2 months ago Primary open angle glaucoma (POAG) of both eyes, moderate stage    [facility] Chris Mendosa MD    Office Visit    2 months ago [address], Angelina Posada MD    Office Visit    6 months ago Pre-op exam    [facility], Angelina Posada MD    Office Visit    6 months ago Primary open angle glaucoma (POAG) of both eyes, moderate stage    [facility] hCris Mendosa MD    Office Visit             Future Appointments         Provider Department Appt Notes    In 6 days Gabriel Kessler PA-C [facility] Dermatology f/u    In 3 weeks Emiliano Christie MD [place], [address], [facility]    In 1 month Chris Mendosa MD [facility] EP/ 4 mos IOP

## 2021-12-22 ENCOUNTER — OFFICE VISIT (OUTPATIENT)
Dept: DERMATOLOGY CLINIC | Facility: CLINIC | Age: 66
End: 2021-12-22
Payer: COMMERCIAL

## 2021-12-22 DIAGNOSIS — L43.9 LICHEN PLANUS: Primary | ICD-10-CM

## 2021-12-22 PROCEDURE — 99213 OFFICE O/P EST LOW 20 MIN: CPT | Performed by: PHYSICIAN ASSISTANT

## 2021-12-22 NOTE — PROGRESS NOTES
HPI:    Patient ID: Luzma Benton is a 78year old male. Patient presents for follow-up on his lichen planus. He has been using clobetasol sometimes and prototic daily on the arms and foot. No draining. Still has itching.  Does take xyzal at night f Delayed Release TAKE 1 CAPSULE BY MOUTH EVERY DAY IN THE MORNING 90 capsule 3   • LATANOPROST 0.005 % Ophthalmic Solution INSTILL 1 DROP BY OPHTHALMIC ROUTE EVERY NIGHT INTO BOTH EYES 3 Bottle 3   • Metoprolol Succinate  MG Oral Tablet 24 Hr Take 1 t

## 2022-01-11 ENCOUNTER — OFFICE VISIT (OUTPATIENT)
Dept: INTERNAL MEDICINE CLINIC | Facility: CLINIC | Age: 67
End: 2022-01-11
Payer: COMMERCIAL

## 2022-01-11 VITALS
BODY MASS INDEX: 39.37 KG/M2 | HEIGHT: 66 IN | SYSTOLIC BLOOD PRESSURE: 122 MMHG | WEIGHT: 245 LBS | RESPIRATION RATE: 16 BRPM | HEART RATE: 60 BPM | DIASTOLIC BLOOD PRESSURE: 70 MMHG

## 2022-01-11 DIAGNOSIS — Z87.891 HISTORY OF NICOTINE DEPENDENCE: ICD-10-CM

## 2022-01-11 DIAGNOSIS — E11.42 TYPE 2 DIABETES MELLITUS WITH DIABETIC POLYNEUROPATHY, WITHOUT LONG-TERM CURRENT USE OF INSULIN (HCC): Primary | ICD-10-CM

## 2022-01-11 DIAGNOSIS — I10 ESSENTIAL HYPERTENSION: ICD-10-CM

## 2022-01-11 DIAGNOSIS — E78.00 HYPERCHOLESTEROLEMIA: ICD-10-CM

## 2022-01-11 PROCEDURE — 3008F BODY MASS INDEX DOCD: CPT | Performed by: INTERNAL MEDICINE

## 2022-01-11 PROCEDURE — 3078F DIAST BP <80 MM HG: CPT | Performed by: INTERNAL MEDICINE

## 2022-01-11 PROCEDURE — 99214 OFFICE O/P EST MOD 30 MIN: CPT | Performed by: INTERNAL MEDICINE

## 2022-01-11 PROCEDURE — 3074F SYST BP LT 130 MM HG: CPT | Performed by: INTERNAL MEDICINE

## 2022-01-11 PROCEDURE — 90471 IMMUNIZATION ADMIN: CPT | Performed by: INTERNAL MEDICINE

## 2022-01-11 PROCEDURE — 90662 IIV NO PRSV INCREASED AG IM: CPT | Performed by: INTERNAL MEDICINE

## 2022-01-11 RX ORDER — METOPROLOL SUCCINATE 100 MG/1
100 TABLET, EXTENDED RELEASE ORAL EVERY EVENING
Qty: 90 TABLET | Refills: 1 | Status: SHIPPED | OUTPATIENT
Start: 2022-01-11

## 2022-01-11 RX ORDER — METOPROLOL SUCCINATE 100 MG/1
100 TABLET, EXTENDED RELEASE ORAL EVERY EVENING
Qty: 90 TABLET | Refills: 1 | Status: CANCELLED | OUTPATIENT
Start: 2022-01-11

## 2022-01-11 RX ORDER — METFORMIN HYDROCHLORIDE 500 MG/1
2000 TABLET, EXTENDED RELEASE ORAL
Qty: 360 TABLET | Refills: 1 | Status: CANCELLED | OUTPATIENT
Start: 2022-01-11

## 2022-01-11 RX ORDER — PRAVASTATIN SODIUM 10 MG
TABLET ORAL
Qty: 90 TABLET | Refills: 1 | Status: CANCELLED | OUTPATIENT
Start: 2022-01-11

## 2022-01-11 NOTE — ASSESSMENT & PLAN NOTE
Controlled. Continue present management. Recommend CT heart. Pt has a strong FH heart disease and multiple other risk factore.

## 2022-01-11 NOTE — PROGRESS NOTES
HPI:    Patient ID: Ruma Reza is a 78year old male. HPI:  He called for his refills and they are ready for .   Pt c/o rash and is seeing the dermatologist.  It was thought to be due to the cymbalta and the Cymbalta was stopped, however p 300 strip 3   • Clobetasol Propionate 0.05 % External Ointment Apply 1 Application topically daily as needed. Apply to affected areas 1x/day as needed 60 g 1   • levocetirizine (XYZAL) 5 MG Oral Tab Take 1 tablet (5 mg total) by mouth daily.  30 tablet 3 Neg        Review of Systems   Respiratory: Negative for cough, shortness of breath and wheezing. Cardiovascular: Negative for chest pain and palpitations.         ./70 (BP Location: Left arm, Patient Position: Sitting, Cuff Size: large)   Pulse 60 MG Oral Tablet 24 Hr 90 tablet 1     Sig: Take 1 tablet (100 mg total) by mouth every evening. • dapagliflozin (FARXIGA) 10 MG Oral Tab 90 tablet 0     Sig: Take 1 tablet (10 mg total) by mouth daily.

## 2022-01-11 NOTE — ASSESSMENT & PLAN NOTE
Patient's last A1c was 7.0. I added Jonh Recinos and he has not had side effects from this. Advised patient to go ahead and stop the glimepiride. I will increase the Farxiga to 10 mg daily. Check A1c in 2 to 3 months.

## 2022-01-11 NOTE — PATIENT INSTRUCTIONS
Please schedule a nurse visit for the Shingles vaccine. To schedule a CT lung sceen and CT calcium heart test, call [phone]. Please schedule your next appointment in February for follow-up.     Please do your blood tests 2-5 days prior to your ap

## 2022-02-08 DIAGNOSIS — I10 ESSENTIAL HYPERTENSION: ICD-10-CM

## 2022-02-08 RX ORDER — LOSARTAN POTASSIUM 100 MG/1
100 TABLET ORAL DAILY
Qty: 90 TABLET | Refills: 1 | Status: SHIPPED | OUTPATIENT
Start: 2022-02-08 | End: 2022-07-18

## 2022-02-09 RX ORDER — LEVOCETIRIZINE DIHYDROCHLORIDE 5 MG/1
5 TABLET, FILM COATED ORAL DAILY
Qty: 30 TABLET | Refills: 0 | Status: SHIPPED | OUTPATIENT
Start: 2022-02-09 | End: 2022-03-04

## 2022-02-10 ENCOUNTER — OFFICE VISIT (OUTPATIENT)
Dept: OPHTHALMOLOGY | Facility: CLINIC | Age: 67
End: 2022-02-10
Payer: COMMERCIAL

## 2022-02-10 DIAGNOSIS — H40.1132 PRIMARY OPEN ANGLE GLAUCOMA (POAG) OF BOTH EYES, MODERATE STAGE: Primary | ICD-10-CM

## 2022-02-10 PROCEDURE — 99213 OFFICE O/P EST LOW 20 MIN: CPT | Performed by: OPHTHALMOLOGY

## 2022-02-10 RX ORDER — LATANOPROST 50 UG/ML
SOLUTION/ DROPS OPHTHALMIC
Qty: 3 EACH | Refills: 3 | Status: SHIPPED | OUTPATIENT
Start: 2022-02-10

## 2022-02-10 NOTE — PATIENT INSTRUCTIONS
Primary open angle glaucoma (POAG) of both eyes, moderate stage  Intraocular pressure stable, tolerating medications. Will continue same regimen of Latanoprost at bedtime in both eyes. Will have patient back in 4 months for a visual field, optic nerve scan and pressure check.

## 2022-02-10 NOTE — ASSESSMENT & PLAN NOTE
Intraocular pressure stable, tolerating medications. Will continue same regimen of Latanoprost at bedtime in both eyes. Will have patient back in 4 months for a visual field, optic nerve scan and pressure check.

## 2022-02-22 ENCOUNTER — HOSPITAL ENCOUNTER (OUTPATIENT)
Dept: CT IMAGING | Facility: HOSPITAL | Age: 67
Discharge: HOME | End: 2022-02-22
Attending: INTERNAL MEDICINE
Payer: COMMERCIAL

## 2022-02-22 DIAGNOSIS — Z87.891 HISTORY OF NICOTINE DEPENDENCE: ICD-10-CM

## 2022-02-22 DIAGNOSIS — E78.00 HYPERCHOLESTEROLEMIA: ICD-10-CM

## 2022-02-22 PROCEDURE — 71271 CT THORAX LUNG CANCER SCR C-: CPT | Performed by: INTERNAL MEDICINE

## 2022-02-23 ENCOUNTER — OFFICE VISIT (OUTPATIENT)
Dept: DERMATOLOGY CLINIC | Facility: CLINIC | Age: 67
End: 2022-02-23
Payer: COMMERCIAL

## 2022-02-23 DIAGNOSIS — L43.9 LICHEN PLANUS: Primary | ICD-10-CM

## 2022-02-23 PROCEDURE — 99213 OFFICE O/P EST LOW 20 MIN: CPT | Performed by: PHYSICIAN ASSISTANT

## 2022-02-24 ENCOUNTER — LAB ENCOUNTER (OUTPATIENT)
Dept: LAB | Age: 67
End: 2022-02-24
Attending: INTERNAL MEDICINE
Payer: COMMERCIAL

## 2022-02-24 DIAGNOSIS — E11.9 DIABETES MELLITUS TYPE 2 WITHOUT RETINOPATHY (HCC): ICD-10-CM

## 2022-02-24 LAB
EST. AVERAGE GLUCOSE BLD GHB EST-MCNC: 163 MG/DL (ref 68–126)
HBA1C MFR BLD: 7.3 % (ref ?–5.7)

## 2022-02-24 PROCEDURE — 36415 COLL VENOUS BLD VENIPUNCTURE: CPT

## 2022-02-24 PROCEDURE — 83036 HEMOGLOBIN GLYCOSYLATED A1C: CPT

## 2022-02-24 PROCEDURE — 3051F HG A1C>EQUAL 7.0%<8.0%: CPT | Performed by: INTERNAL MEDICINE

## 2022-03-01 ENCOUNTER — OFFICE VISIT (OUTPATIENT)
Dept: INTERNAL MEDICINE CLINIC | Facility: CLINIC | Age: 67
End: 2022-03-01
Payer: COMMERCIAL

## 2022-03-01 VITALS
HEART RATE: 61 BPM | DIASTOLIC BLOOD PRESSURE: 71 MMHG | BODY MASS INDEX: 38.81 KG/M2 | TEMPERATURE: 98 F | HEIGHT: 66 IN | RESPIRATION RATE: 16 BRPM | WEIGHT: 241.5 LBS | SYSTOLIC BLOOD PRESSURE: 149 MMHG

## 2022-03-01 DIAGNOSIS — R93.1 AGATSTON CORONARY ARTERY CALCIUM SCORE BETWEEN 200 AND 399: ICD-10-CM

## 2022-03-01 DIAGNOSIS — G89.29 CHRONIC LEFT-SIDED LOW BACK PAIN WITH LEFT-SIDED SCIATICA: ICD-10-CM

## 2022-03-01 DIAGNOSIS — E78.00 HYPERCHOLESTEROLEMIA: ICD-10-CM

## 2022-03-01 DIAGNOSIS — I25.10 ATHEROSCLEROSIS OF NATIVE CORONARY ARTERY OF NATIVE HEART WITHOUT ANGINA PECTORIS: ICD-10-CM

## 2022-03-01 DIAGNOSIS — M54.42 CHRONIC LEFT-SIDED LOW BACK PAIN WITH LEFT-SIDED SCIATICA: ICD-10-CM

## 2022-03-01 DIAGNOSIS — I10 ESSENTIAL HYPERTENSION: ICD-10-CM

## 2022-03-01 DIAGNOSIS — E11.42 CONTROLLED TYPE 2 DIABETES MELLITUS WITH DIABETIC POLYNEUROPATHY, WITHOUT LONG-TERM CURRENT USE OF INSULIN (HCC): Primary | ICD-10-CM

## 2022-03-01 PROBLEM — M43.16 SPONDYLOLISTHESIS, LUMBAR REGION: Status: RESOLVED | Noted: 2017-05-08 | Resolved: 2022-03-01

## 2022-03-01 PROBLEM — M43.10 RETROLISTHESIS: Status: RESOLVED | Noted: 2017-05-08 | Resolved: 2022-03-01

## 2022-03-01 PROBLEM — M51.9 LUMBAR DISC DISEASE: Status: RESOLVED | Noted: 2017-05-08 | Resolved: 2022-03-01

## 2022-03-01 PROBLEM — M54.16 LEFT LUMBAR RADICULOPATHY: Status: RESOLVED | Noted: 2017-05-04 | Resolved: 2022-03-01

## 2022-03-01 PROBLEM — M48.061 LUMBAR FORAMINAL STENOSIS: Status: RESOLVED | Noted: 2017-05-08 | Resolved: 2022-03-01

## 2022-03-01 PROCEDURE — 3077F SYST BP >= 140 MM HG: CPT | Performed by: INTERNAL MEDICINE

## 2022-03-01 PROCEDURE — 99214 OFFICE O/P EST MOD 30 MIN: CPT | Performed by: INTERNAL MEDICINE

## 2022-03-01 PROCEDURE — 3008F BODY MASS INDEX DOCD: CPT | Performed by: INTERNAL MEDICINE

## 2022-03-01 PROCEDURE — 3078F DIAST BP <80 MM HG: CPT | Performed by: INTERNAL MEDICINE

## 2022-03-01 RX ORDER — ATORVASTATIN CALCIUM 20 MG/1
20 TABLET, FILM COATED ORAL DAILY
Qty: 90 TABLET | Refills: 1 | Status: SHIPPED | OUTPATIENT
Start: 2022-03-01

## 2022-03-01 RX ORDER — CLOBETASOL PROPIONATE 0.5 MG/G
1 OINTMENT TOPICAL DAILY PRN
Qty: 120 G | Refills: 1 | Status: SHIPPED | OUTPATIENT
Start: 2022-03-01

## 2022-03-01 RX ORDER — GABAPENTIN 300 MG/1
300 CAPSULE ORAL 3 TIMES DAILY PRN
Qty: 270 CAPSULE | Refills: 1 | Status: SHIPPED | OUTPATIENT
Start: 2022-03-01

## 2022-03-01 RX ORDER — HYDROCHLOROTHIAZIDE 12.5 MG/1
12.5 TABLET ORAL DAILY
Qty: 90 TABLET | Refills: 1 | Status: SHIPPED | OUTPATIENT
Start: 2022-03-01

## 2022-03-01 NOTE — ASSESSMENT & PLAN NOTE
Patient's blood pressures not controlled. He is on losartan and amlodipine. He was on hydrochlorothiazide in the past for quite a long time, however he thought that was the medication that made him feel bad and he stopped it. We will resume it at half strength. Continue other medications. Follow-up in 3 months.

## 2022-03-01 NOTE — ASSESSMENT & PLAN NOTE
Patient's A1c went up to 7.3. He thinks the reason for that is that he stopped exercising. He has resumed exercising now. Continue current medications. Foot exam normal today. Follow-up in 3 months.

## 2022-03-01 NOTE — ASSESSMENT & PLAN NOTE
Patient had a cardiac cath in 2012 which showed some coronary disease. Recently he had a CT heart which also showed coronary disease. Continue low-dose aspirin and statin.

## 2022-03-01 NOTE — PATIENT INSTRUCTIONS
Stop pravastatin. Start atorvastatin. Please schedule your next appointment in June  Do fasting labs 2-4 days before that appointment. Fast for 12 hours. Water and meds are okay.    Do not take vitamins or supplements for 3 days prior to the blood test.

## 2022-03-01 NOTE — ASSESSMENT & PLAN NOTE
Patient has been on pravastatin 10 mg daily. We will change this to Lipitor 20 mg daily because of his coronary disease and diabetes.

## 2022-03-04 RX ORDER — LEVOCETIRIZINE DIHYDROCHLORIDE 5 MG/1
5 TABLET, FILM COATED ORAL DAILY
Qty: 30 TABLET | Refills: 0 | Status: SHIPPED | OUTPATIENT
Start: 2022-03-04 | End: 2022-03-30

## 2022-03-30 ENCOUNTER — TELEPHONE (OUTPATIENT)
Dept: INTERNAL MEDICINE CLINIC | Facility: CLINIC | Age: 67
End: 2022-03-30

## 2022-03-30 RX ORDER — LEVOCETIRIZINE DIHYDROCHLORIDE 5 MG/1
5 TABLET, FILM COATED ORAL DAILY
Qty: 90 TABLET | Refills: 1 | Status: SHIPPED | OUTPATIENT
Start: 2022-03-30 | End: 2022-07-18

## 2022-03-30 RX ORDER — LEVOCETIRIZINE DIHYDROCHLORIDE 5 MG/1
5 TABLET, FILM COATED ORAL DAILY
Qty: 30 TABLET | Refills: 0 | Status: SHIPPED | OUTPATIENT
Start: 2022-03-30

## 2022-04-06 DIAGNOSIS — I10 ESSENTIAL HYPERTENSION: ICD-10-CM

## 2022-04-06 RX ORDER — AMLODIPINE BESYLATE 10 MG/1
10 TABLET ORAL DAILY
Qty: 90 TABLET | Refills: 1 | Status: SHIPPED | OUTPATIENT
Start: 2022-04-06 | End: 2022-07-18

## 2022-04-06 NOTE — TELEPHONE ENCOUNTER
Refill passed per [place] [facility] protocol.      Requested Prescriptions   Pending Prescriptions Disp Refills    AMLODIPINE 10 MG Oral Tab [Pharmacy Med Name: AMLODIPINE BESYLATE 10 MG TAB] 90 tablet 1     Sig: TAKE 1 TABLET BY MOUTH EVERY DAY        Hypertensive Medications Protocol Passed - 4/6/2022 12:11 AM        Passed - CMP or BMP in past 12 months        Passed - Appointment in past 6 or next 3 months        Passed - GFR  > 50     Lab Results   Component Value Date    GFRAA 77 11/30/2021                         Recent Outpatient Visits              1 month ago Controlled type 2 diabetes mellitus with diabetic polyneuropathy, without long-term current use of insulin [facility])    [facility], [address], Nadine Lyles MD    Office Visit    1 month ago Lichen planus    [facility] Mickey Danielle PA-C    Office Visit    1 month ago Primary open angle glaucoma (POAG) of both eyes, moderate stage    [facility] Lucy Fischer MD    Office Visit    2 months ago Type 2 diabetes mellitus with diabetic polyneuropathy, without long-term current use of insulin [facility])    [facility], [address], Nadine Lyles MD    Office Visit    3 months ago Lichen planus    [facility] Mickey Danielle PA-C    Office Visit             Future Appointments         Provider Department Appt Notes    In 2 months Lucy Fischer MD [facility] EP/ VF, OCT and IOP     In 2 months Marlene Wilson MD [facility], [address] [facility]

## 2022-04-07 NOTE — TELEPHONE ENCOUNTER
Refill passed per [address] [facility] protocol.   Requested Prescriptions   Pending Prescriptions Disp Refills    FARXIGA 10 MG Oral Tab [Pharmacy Med Name: Shakir Nanci 10 MG TABLET] 90 tablet 0     Sig: TAKE 1 TABLET BY MOUTH EVERY DAY        Diabetes Medication Protocol Passed - 4/7/2022 12:14 AM        Passed - Last A1C < 7.5 and within past 6 months     Lab Results   Component Value Date    A1C 7.3 (H) 02/24/2022               Passed - Appointment in past 6 or next 3 months        Passed - GFR  > 50     Lab Results   Component Value Date    GFRAA 77 11/30/2021                 Passed - GFR in the past 12 months            Future Appointments         Provider Department Appt Notes    In 2 months Geo Llamas MD [facility] EP/ VF, OCT and IOP     In 2 months Petros Solorio MD [address], [address], [facility] Overall health          Recent Outpatient Visits              1 month ago Controlled type 2 diabetes mellitus with diabetic polyneuropathy, without long-term current use of insulin [facility])    [address], [address], Ge Gaston MD    Office Visit    1 month ago Lichen planus    [facility] Raymond Fragoso PA-C    Office Visit    1 month ago Primary open angle glaucoma (POAG) of both eyes, moderate stage    [facility] Geo Llamas MD    Office Visit    2 months ago Type 2 diabetes mellitus with diabetic polyneuropathy, without long-term current use of insulin [facility])    [address], [address], Ge Gaston MD    Office Visit    3 months ago Lichen planus    [facility] Raymond Fragoso PA-C    Office Visit

## 2022-04-09 RX ORDER — TACROLIMUS 1 MG/G
OINTMENT TOPICAL
Qty: 60 G | Refills: 3 | Status: SHIPPED | OUTPATIENT
Start: 2022-04-09

## 2022-04-25 RX ORDER — LATANOPROST 50 UG/ML
SOLUTION/ DROPS OPHTHALMIC
Qty: 7.5 ML | Refills: 3 | Status: SHIPPED | OUTPATIENT
Start: 2022-04-25

## 2022-04-25 NOTE — TELEPHONE ENCOUNTER
LDE: 10/7/21  Last visit: 2/10/22  Due for: VF, OCT and IOP check   Upcoming visit: 6/23/22    Routed to \[facility] [place] [facility]\""

## 2022-05-02 RX ORDER — METFORMIN HYDROCHLORIDE 500 MG/1
2000 TABLET, EXTENDED RELEASE ORAL
Qty: 360 TABLET | Refills: 1 | Status: SHIPPED | OUTPATIENT
Start: 2022-05-02

## 2022-05-02 NOTE — TELEPHONE ENCOUNTER
Refill passed per [facility] protocol   Requested Prescriptions   Pending Prescriptions Disp Refills    METFORMIN  MG Oral Tablet 24 Hr [Pharmacy Med Name: METFORMIN HCL  MG TABLET] 360 tablet 1     Sig: TAKE 4 TABLETS BY MOUTH DAILY WITH BREAKFAST        Diabetes Medication Protocol Passed - 5/2/2022 12:11 AM        Passed - Last A1C < 7.5 and within past 6 months     Lab Results   Component Value Date    A1C 7.3 (H) 02/24/2022               Passed - Appointment in past 6 or next 3 months        Passed - GFR  > 50     Lab Results   Component Value Date    GFRAA 77 11/30/2021                 Passed - GFR in the past 12 months

## 2022-05-16 DIAGNOSIS — I10 ESSENTIAL HYPERTENSION: ICD-10-CM

## 2022-05-17 RX ORDER — CLOBETASOL PROPIONATE 0.5 MG/G
1 OINTMENT TOPICAL DAILY PRN
Qty: 120 G | Refills: 1 | Status: SHIPPED | OUTPATIENT
Start: 2022-05-17

## 2022-05-17 RX ORDER — HYDROCHLOROTHIAZIDE 12.5 MG/1
12.5 TABLET ORAL DAILY
Qty: 90 TABLET | Refills: 1 | Status: SHIPPED | OUTPATIENT
Start: 2022-05-17

## 2022-05-23 ENCOUNTER — TELEPHONE (OUTPATIENT)
Dept: OPHTHALMOLOGY | Facility: CLINIC | Age: 67
End: 2022-05-23

## 2022-05-23 RX ORDER — LATANOPROST 50 UG/ML
SOLUTION/ DROPS OPHTHALMIC
Qty: 7.5 ML | Refills: 3 | Status: SHIPPED | OUTPATIENT
Start: 2022-05-23

## 2022-06-23 ENCOUNTER — OFFICE VISIT (OUTPATIENT)
Dept: OPHTHALMOLOGY | Facility: CLINIC | Age: 67
End: 2022-06-23
Payer: COMMERCIAL

## 2022-06-23 DIAGNOSIS — H40.1132 PRIMARY OPEN ANGLE GLAUCOMA (POAG) OF BOTH EYES, MODERATE STAGE: ICD-10-CM

## 2022-06-23 PROCEDURE — 92083 EXTENDED VISUAL FIELD XM: CPT | Performed by: OPHTHALMOLOGY

## 2022-06-23 PROCEDURE — 92133 CPTRZD OPH DX IMG PST SGM ON: CPT | Performed by: OPHTHALMOLOGY

## 2022-06-23 PROCEDURE — 99213 OFFICE O/P EST LOW 20 MIN: CPT | Performed by: OPHTHALMOLOGY

## 2022-06-23 NOTE — ASSESSMENT & PLAN NOTE
Visual field and OCT completed in office today with stable results that were discussed with patient in office today. Intraocular pressure stable, tolerating medications. Will continue same regimen of Latanoprost at bedtime in both eyes.   Will have patient back in 4 months for a dilated, diabetic eye exam.

## 2022-06-23 NOTE — PATIENT INSTRUCTIONS
Primary open angle glaucoma (POAG) of both eyes, moderate stage  Visual field and OCT completed in office today with stable results that were discussed with patient in office today. Intraocular pressure stable, tolerating medications. Will continue same regimen of Latanoprost at bedtime in both eyes.   Will have patient back in 4 months for a dilated, diabetic eye exam.

## 2022-06-29 ENCOUNTER — LAB ENCOUNTER (OUTPATIENT)
Dept: LAB | Age: 67
End: 2022-06-29
Attending: INTERNAL MEDICINE
Payer: COMMERCIAL

## 2022-06-29 DIAGNOSIS — I25.10 ATHEROSCLEROSIS OF NATIVE CORONARY ARTERY OF NATIVE HEART WITHOUT ANGINA PECTORIS: ICD-10-CM

## 2022-06-29 LAB
ALT SERPL-CCNC: 30 U/L
AST SERPL-CCNC: 20 U/L (ref 15–37)
CHOLEST SERPL-MCNC: 114 MG/DL (ref ?–200)
EST. AVERAGE GLUCOSE BLD GHB EST-MCNC: 186 MG/DL (ref 68–126)
FASTING PATIENT LIPID ANSWER: YES
HBA1C MFR BLD: 8.1 % (ref ?–5.7)
HDLC SERPL-MCNC: 44 MG/DL (ref 40–59)
LDLC SERPL CALC-MCNC: 57 MG/DL (ref ?–100)
NONHDLC SERPL-MCNC: 70 MG/DL (ref ?–130)
TRIGL SERPL-MCNC: 58 MG/DL (ref 30–149)
VLDLC SERPL CALC-MCNC: 8 MG/DL (ref 0–30)

## 2022-06-29 PROCEDURE — 80061 LIPID PANEL: CPT

## 2022-06-29 PROCEDURE — 84460 ALANINE AMINO (ALT) (SGPT): CPT

## 2022-06-29 PROCEDURE — 36415 COLL VENOUS BLD VENIPUNCTURE: CPT

## 2022-06-29 PROCEDURE — 83036 HEMOGLOBIN GLYCOSYLATED A1C: CPT

## 2022-06-29 PROCEDURE — 84450 TRANSFERASE (AST) (SGOT): CPT

## 2022-06-29 PROCEDURE — 3052F HG A1C>EQUAL 8.0%<EQUAL 9.0%: CPT | Performed by: INTERNAL MEDICINE

## 2022-07-02 DIAGNOSIS — K21.9 GASTROESOPHAGEAL REFLUX DISEASE: ICD-10-CM

## 2022-07-02 DIAGNOSIS — I10 ESSENTIAL HYPERTENSION: ICD-10-CM

## 2022-07-03 RX ORDER — METOPROLOL SUCCINATE 100 MG/1
TABLET, EXTENDED RELEASE ORAL
Qty: 90 TABLET | Refills: 1 | Status: SHIPPED | OUTPATIENT
Start: 2022-07-03

## 2022-07-03 RX ORDER — OMEPRAZOLE 20 MG/1
CAPSULE, DELAYED RELEASE ORAL
Qty: 90 CAPSULE | Refills: 1 | Status: SHIPPED | OUTPATIENT
Start: 2022-07-03

## 2022-07-05 ENCOUNTER — OFFICE VISIT (OUTPATIENT)
Dept: INTERNAL MEDICINE CLINIC | Facility: CLINIC | Age: 67
End: 2022-07-05
Payer: COMMERCIAL

## 2022-07-05 ENCOUNTER — TELEPHONE (OUTPATIENT)
Dept: DERMATOLOGY CLINIC | Facility: CLINIC | Age: 67
End: 2022-07-05

## 2022-07-05 VITALS
HEART RATE: 66 BPM | HEIGHT: 66 IN | BODY MASS INDEX: 37.63 KG/M2 | DIASTOLIC BLOOD PRESSURE: 62 MMHG | RESPIRATION RATE: 16 BRPM | WEIGHT: 234.13 LBS | SYSTOLIC BLOOD PRESSURE: 136 MMHG

## 2022-07-05 DIAGNOSIS — L98.491 SKIN ULCER, LIMITED TO BREAKDOWN OF SKIN (HCC): ICD-10-CM

## 2022-07-05 DIAGNOSIS — B35.3 TINEA PEDIS OF LEFT FOOT: ICD-10-CM

## 2022-07-05 DIAGNOSIS — E11.42 TYPE 2 DIABETES MELLITUS WITH DIABETIC POLYNEUROPATHY, WITHOUT LONG-TERM CURRENT USE OF INSULIN (HCC): Primary | ICD-10-CM

## 2022-07-05 PROCEDURE — 3075F SYST BP GE 130 - 139MM HG: CPT | Performed by: INTERNAL MEDICINE

## 2022-07-05 PROCEDURE — 3008F BODY MASS INDEX DOCD: CPT | Performed by: INTERNAL MEDICINE

## 2022-07-05 PROCEDURE — 99214 OFFICE O/P EST MOD 30 MIN: CPT | Performed by: INTERNAL MEDICINE

## 2022-07-05 PROCEDURE — 3078F DIAST BP <80 MM HG: CPT | Performed by: INTERNAL MEDICINE

## 2022-07-05 RX ORDER — TACROLIMUS 1 MG/G
1 OINTMENT TOPICAL 2 TIMES DAILY
Qty: 60 G | Refills: 3 | Status: SHIPPED | OUTPATIENT
Start: 2022-07-05

## 2022-07-05 RX ORDER — TERBINAFINE HYDROCHLORIDE 250 MG/1
250 TABLET ORAL DAILY
Qty: 14 TABLET | Refills: 0 | Status: SHIPPED | OUTPATIENT
Start: 2022-07-05 | End: 2022-07-19

## 2022-07-05 NOTE — ASSESSMENT & PLAN NOTE
Patient has 2 areas of skin ulcerations in addition to fissures and thickening. It appears to have some fungal elements to it. Will prescribe oral antifungal.  Advised him to follow-up with dermatology. Refilled the tacrolimus.

## 2022-07-05 NOTE — PATIENT INSTRUCTIONS
Please schedule your next appointment in September for follow-up. Please do your blood tests 2-5 days prior to your appointment with me.   You do not need to fast.

## 2022-07-05 NOTE — ASSESSMENT & PLAN NOTE
Patient's A1c went up to 8.1. He declined injectable medication. He wants to continue his current medications and make lifestyle modifications. Unfortunately cannot exercise at this time due to his feet. I advised him the importance of blood sugar control per healing of his foot ulcers and also for prevention of long-term complications.

## 2022-07-17 DIAGNOSIS — I25.10 ATHEROSCLEROSIS OF NATIVE CORONARY ARTERY OF NATIVE HEART WITHOUT ANGINA PECTORIS: ICD-10-CM

## 2022-07-17 DIAGNOSIS — E11.42 TYPE 2 DIABETES MELLITUS WITH DIABETIC POLYNEUROPATHY, WITHOUT LONG-TERM CURRENT USE OF INSULIN (HCC): ICD-10-CM

## 2022-07-17 DIAGNOSIS — I10 ESSENTIAL HYPERTENSION: ICD-10-CM

## 2022-07-18 RX ORDER — LOSARTAN POTASSIUM 100 MG/1
TABLET ORAL
Qty: 90 TABLET | Refills: 1 | Status: SHIPPED | OUTPATIENT
Start: 2022-07-18

## 2022-07-18 RX ORDER — LEVOCETIRIZINE DIHYDROCHLORIDE 5 MG/1
5 TABLET, FILM COATED ORAL DAILY
Qty: 90 TABLET | Refills: 1 | Status: SHIPPED | OUTPATIENT
Start: 2022-07-18

## 2022-07-18 RX ORDER — DAPAGLIFLOZIN 10 MG/1
TABLET, FILM COATED ORAL
Qty: 90 TABLET | Refills: 1 | Status: SHIPPED | OUTPATIENT
Start: 2022-07-18

## 2022-07-18 RX ORDER — AMLODIPINE BESYLATE 10 MG/1
TABLET ORAL
Qty: 90 TABLET | Refills: 1 | Status: SHIPPED | OUTPATIENT
Start: 2022-07-18

## 2022-07-18 RX ORDER — ATORVASTATIN CALCIUM 20 MG/1
TABLET, FILM COATED ORAL
Qty: 90 TABLET | Refills: 1 | Status: SHIPPED | OUTPATIENT
Start: 2022-07-18

## 2022-08-02 ENCOUNTER — OFFICE VISIT (OUTPATIENT)
Dept: DERMATOLOGY CLINIC | Facility: CLINIC | Age: 67
End: 2022-08-02
Payer: COMMERCIAL

## 2022-08-02 DIAGNOSIS — L43.9 LICHEN PLANUS: Primary | ICD-10-CM

## 2022-08-02 PROCEDURE — 99213 OFFICE O/P EST LOW 20 MIN: CPT | Performed by: PHYSICIAN ASSISTANT

## 2022-08-02 RX ORDER — BETAMETHASONE DIPROPIONATE 0.05 %
OINTMENT (GRAM) TOPICAL
Qty: 50 G | Refills: 5 | Status: SHIPPED | OUTPATIENT
Start: 2022-08-02

## 2022-08-24 DIAGNOSIS — E11.42 CONTROLLED TYPE 2 DIABETES MELLITUS WITH DIABETIC POLYNEUROPATHY, WITHOUT LONG-TERM CURRENT USE OF INSULIN (HCC): ICD-10-CM

## 2022-08-25 RX ORDER — GABAPENTIN 300 MG/1
300 CAPSULE ORAL 3 TIMES DAILY PRN
Qty: 270 CAPSULE | Refills: 1 | Status: SHIPPED | OUTPATIENT
Start: 2022-08-25 | End: 2023-02-20

## 2022-08-25 NOTE — TELEPHONE ENCOUNTER
Refill passed per [facility] [employer] [facility] protocol. Requested Prescriptions   Pending Prescriptions Disp Refills    GABAPENTIN 300 MG Oral Cap [Pharmacy Med Name: GABAPENTIN 300 MG CAPSULE] 270 capsule 1     Sig: TAKE 1 CAPSULE BY MOUTH 3 TIMES DAILY AS NEEDED.         Neurology Medications Passed - 8/24/2022  3:02 PM        Passed - In person appointment or virtual visit in the past 6 mos or appointment in next 3 mos       Recent Outpatient Visits              3 weeks ago Lichen planus    [facility] Girma Rahman PA-C    Office Visit    1 month ago Type 2 diabetes mellitus with diabetic polyneuropathy, without long-term current use of insulin [facility])    [place] [employer] [facility], [address], Mane Prajapati MD    Office Visit    2 months ago Primary open angle glaucoma (POAG) of both eyes, moderate stage    [facility] Lisa Willis MD    Office Visit    5 months ago Controlled type 2 diabetes mellitus with diabetic polyneuropathy, without long-term current use of insulin [facility])    [facility], [address], Mane Prajapati MD    Office Visit    6 months ago Lichen planus    [facility] Girma Rahman PA-C    Office Visit     Future Appointments         Provider Department Appt Notes    In 1 month Richelle Orozco MD [facility], [address] [facility] Follow up    In 1 month Lisa Willis MD [facility] EP/DM EE                     Recent Outpatient Visits              3 weeks ago Lichen planus    [facility] Girma Rahman PA-C    Office Visit    1 month ago Type 2 diabetes mellitus with diabetic polyneuropathy, without long-term current use of insulin [facility])    [place] [facility], [address], Mane Prajapati MD    Office Visit    2 months ago Primary open angle glaucoma (POAG) of both eyes, moderate stage    [facility] Bon Maldonado MD    Office Visit    5 months ago Controlled type 2 diabetes mellitus with diabetic polyneuropathy, without long-term current use of insulin [facility])    [facility], [address], Lillie Maharaj MD    Office Visit    6 months ago Lichen planus    [facility]ermat[facility] Grecia Araujo PA-C    Office Visit            Future Appointments         Provider Department Appt Notes    In 1 month Nelson Greco MD [facility], [facility], [address] [facility] Follow [facility]    In 1 month Bon Maldonado MD [facility] EP/DM EE

## 2022-09-19 RX ORDER — BETAMETHASONE DIPROPIONATE 0.05 %
OINTMENT (GRAM) TOPICAL
Qty: 50 G | Refills: 5 | OUTPATIENT
Start: 2022-09-19

## 2022-09-21 ENCOUNTER — LAB ENCOUNTER (OUTPATIENT)
Dept: LAB | Age: 67
End: 2022-09-21
Attending: INTERNAL MEDICINE

## 2022-09-21 DIAGNOSIS — E11.42 TYPE 2 DIABETES MELLITUS WITH DIABETIC POLYNEUROPATHY, WITHOUT LONG-TERM CURRENT USE OF INSULIN (HCC): ICD-10-CM

## 2022-09-21 LAB
ALBUMIN SERPL-MCNC: 4.2 G/DL (ref 3.4–5)
ALBUMIN/GLOB SERPL: 1.1 {RATIO} (ref 1–2)
ALP LIVER SERPL-CCNC: 72 U/L
ALT SERPL-CCNC: 35 U/L
ANION GAP SERPL CALC-SCNC: 4 MMOL/L (ref 0–18)
AST SERPL-CCNC: 18 U/L (ref 15–37)
BILIRUB SERPL-MCNC: 0.3 MG/DL (ref 0.1–2)
BUN BLD-MCNC: 14 MG/DL (ref 7–18)
BUN/CREAT SERPL: 10.8 (ref 10–20)
CALCIUM BLD-MCNC: 9.8 MG/DL (ref 8.5–10.1)
CHLORIDE SERPL-SCNC: 107 MMOL/L (ref 98–112)
CO2 SERPL-SCNC: 30 MMOL/L (ref 21–32)
CREAT BLD-MCNC: 1.3 MG/DL
EST. AVERAGE GLUCOSE BLD GHB EST-MCNC: 171 MG/DL (ref 68–126)
FASTING STATUS PATIENT QL REPORTED: NO
GFR SERPLBLD BASED ON 1.73 SQ M-ARVRAT: 60 ML/MIN/1.73M2 (ref 60–?)
GLOBULIN PLAS-MCNC: 4 G/DL (ref 2.8–4.4)
GLUCOSE BLD-MCNC: 117 MG/DL (ref 70–99)
HBA1C MFR BLD: 7.6 % (ref ?–5.7)
OSMOLALITY SERPL CALC.SUM OF ELEC: 294 MOSM/KG (ref 275–295)
POTASSIUM SERPL-SCNC: 4.3 MMOL/L (ref 3.5–5.1)
PROT SERPL-MCNC: 8.2 G/DL (ref 6.4–8.2)
SODIUM SERPL-SCNC: 141 MMOL/L (ref 136–145)

## 2022-09-21 PROCEDURE — 36415 COLL VENOUS BLD VENIPUNCTURE: CPT

## 2022-09-21 PROCEDURE — 80053 COMPREHEN METABOLIC PANEL: CPT

## 2022-09-21 PROCEDURE — 3051F HG A1C>EQUAL 7.0%<8.0%: CPT | Performed by: INTERNAL MEDICINE

## 2022-09-21 PROCEDURE — 83036 HEMOGLOBIN GLYCOSYLATED A1C: CPT

## 2022-09-26 ENCOUNTER — OFFICE VISIT (OUTPATIENT)
Facility: CLINIC | Age: 67
End: 2022-09-26

## 2022-09-26 VITALS
DIASTOLIC BLOOD PRESSURE: 62 MMHG | RESPIRATION RATE: 18 BRPM | SYSTOLIC BLOOD PRESSURE: 112 MMHG | OXYGEN SATURATION: 98 % | BODY MASS INDEX: 34.39 KG/M2 | HEIGHT: 66 IN | HEART RATE: 77 BPM | WEIGHT: 214 LBS

## 2022-09-26 DIAGNOSIS — L30.9 DERMATITIS: ICD-10-CM

## 2022-09-26 DIAGNOSIS — I10 ESSENTIAL HYPERTENSION: ICD-10-CM

## 2022-09-26 DIAGNOSIS — Z00.00 ROUTINE HEALTH MAINTENANCE: ICD-10-CM

## 2022-09-26 DIAGNOSIS — E11.42 CONTROLLED TYPE 2 DIABETES MELLITUS WITH DIABETIC POLYNEUROPATHY, WITHOUT LONG-TERM CURRENT USE OF INSULIN (HCC): Primary | ICD-10-CM

## 2022-09-26 PROCEDURE — 99214 OFFICE O/P EST MOD 30 MIN: CPT | Performed by: INTERNAL MEDICINE

## 2022-09-26 PROCEDURE — 3074F SYST BP LT 130 MM HG: CPT | Performed by: INTERNAL MEDICINE

## 2022-09-26 PROCEDURE — 3078F DIAST BP <80 MM HG: CPT | Performed by: INTERNAL MEDICINE

## 2022-09-26 PROCEDURE — 3008F BODY MASS INDEX DOCD: CPT | Performed by: INTERNAL MEDICINE

## 2022-09-26 RX ORDER — METFORMIN HYDROCHLORIDE 500 MG/1
2000 TABLET, EXTENDED RELEASE ORAL
Qty: 360 TABLET | Refills: 1 | Status: SHIPPED | OUTPATIENT
Start: 2022-09-26

## 2022-09-26 RX ORDER — BETAMETHASONE DIPROPIONATE 0.05 %
OINTMENT (GRAM) TOPICAL
Qty: 50 G | Refills: 5 | Status: SHIPPED | OUTPATIENT
Start: 2022-09-26

## 2022-09-26 NOTE — ASSESSMENT & PLAN NOTE
His A1C was 7.6  Neuropathy is controlled. He has an appointment with ophthalmology. Advised COVID booster.

## 2022-09-29 DIAGNOSIS — I10 ESSENTIAL HYPERTENSION: ICD-10-CM

## 2022-09-29 RX ORDER — HYDROCHLOROTHIAZIDE 12.5 MG/1
12.5 TABLET ORAL DAILY
Qty: 90 TABLET | Refills: 1 | Status: SHIPPED | OUTPATIENT
Start: 2022-09-29 | End: 2023-03-22

## 2022-09-29 NOTE — TELEPHONE ENCOUNTER
Refill passed per [facility] protocol.     Requested Prescriptions   Pending Prescriptions Disp Refills    HYDROCHLOROTHIAZIDE 12.5 MG Oral Tab [Pharmacy Med Name: HYDROCHLOROTHIAZIDE 12.5 MG TB] 90 tablet 1     Sig: TAKE 1 TABLET BY MOUTH EVERY DAY        Hypertensive Medications Protocol Passed - 9/29/2022 12:22 PM        Passed - In person appointment in the past 12 or next 3 months       Recent Outpatient Visits              3 days ago Controlled type 2 diabetes mellitus with diabetic polyneuropathy, without long-term current use of insulin [facility])    [facility], [address] [facility], Hector Hector MD    Office Visit    1 month ago Lichen planus    [facility] Janey Villatoro PA-C    Office Visit    2 months ago Type 2 diabetes mellitus with diabetic polyneuropathy, without long-term current use of insulin [facility])    [facility], [address], Ammon Sifuentes MD    Office Visit    3 months ago Primary open angle glaucoma (POAG) of both eyes, moderate stage    [facility] Tre Cadena MD    Office Visit    7 months ago Controlled type 2 diabetes mellitus with diabetic polyneuropathy, without long-term current use of insulin [facility])    [facility], [address] [facility], Ammon Sifuentes MD    Office Visit     Future Appointments         Provider Department Appt Notes    In 3 weeks Tre Cadena MD [facility] EP/DM EE               Passed - Last BP reading less than 140/90     BP Readings from Last 1 Encounters:  09/26/22 : 112/62                Passed - CMP or BMP in past 6 months     Recent Results (from the past 4392 hour(s))   COMP METABOLIC PANEL (14)    Collection Time: 09/21/22 11:39 AM   Result Value Ref Range    Glucose 117 (H) 70 - 99 mg/dL    Sodium 141 136 - 145 mmol/L    Potassium 4.3 3.5 - 5.1 mmol/L    Chloride 107 98 - 112 mmol/L    CO2 30.0 21.0 - 32.0 mmol/L Anion Gap 4 0 - 18 mmol/L    BUN 14 7 - 18 mg/dL    Creatinine 1.30 0.70 - 1.30 mg/dL    BUN/CREA Ratio 10.8 10.0 - 20.0    Calcium, Total 9.8 8.5 - 10.1 mg/dL    Calculated Osmolality 294 275 - 295 mOsm/kg    eGFR-Cr 60 >=60 mL/min/1.73m2    ALT 35 16 - 61 U/L    AST 18 15 - 37 U/L    Alkaline Phosphatase 72 45 - 117 U/L    Bilirubin, Total 0.3 0.1 - 2.0 mg/dL    Total Protein 8.2 6.4 - 8.2 g/dL    Albumin 4.2 3.4 - 5.0 g/dL    Globulin  4.0 2.8 - 4.4 g/dL    A/G Ratio 1.1 1.0 - 2.0    Patient Fasting for CMP? No      *Note: Due to a large number of results and/or encounters for the requested time period, some results have not been displayed. A complete set of results can be found in Results Review.                  Passed - In person appointment or virtual visit in the past 6 months       Recent Outpatient Visits              3 days ago Controlled type 2 diabetes mellitus with diabetic polyneuropathy, without long-term current use of insulin [facility])    [facility], [address] [facility], Lore Dandy, MD    Office Visit    1 month ago Lichen planus    [facility]at[facility] Maricruz Tuttle PA-C    Office Visit    2 months ago Type 2 diabetes mellitus with diabetic polyneuropathy, without long-term current use of insulin [facility])    [employer] [facility], [address], Roro Sanon MD    Office Visit    3 months ago Primary open angle glaucoma (POAG) of both eyes, moderate stage    [facility] Haresh Alvares MD    Office Visit    7 months ago Controlled type 2 diabetes mellitus with diabetic polyneuropathy, without long-term current use of insulin [facility])    [facility], [address], Roro Sanon MD    Office Visit     Future Appointments         Provider Department Appt Notes    In 3 weeks Haresh Alvares MD [facility] EP/DM EE               Passed [facility] or GFRAA > 50     GFR Evaluation  EGFRCR: 60 , resulted on 9/21/2022                  Recent Outpatient Visits              3 days ago Controlled type 2 diabetes mellitus with diabetic polyneuropathy, without long-term current use of insulin [facility])    [facility], [facility], [address] [facility] [address], Nhan Corral MD    Office Visit    1 month ago Lichen planus    [facility] Lily Milligan PA-C    Office Visit    2 months ago Type 2 diabetes mellitus with diabetic polyneuropathy, without long-term current use of insulin [facility])    [facility], [address], Tran Melgar MD    Office Visit    3 months ago Primary open angle glaucoma (POAG) of both eyes, moderate stage    [facility] Leni Benito MD    Office Visit    7 months ago Controlled type 2 diabetes mellitus with diabetic polyneuropathy, without long-term current use of insulin [facility])    [facility], [address], Tran Melgar MD    Office Visit            Future Appointments         Provider Department Appt Notes    In 3 weeks Leni Benito MD [facility] EP/DM EE

## 2022-10-11 RX ORDER — LEVOCETIRIZINE DIHYDROCHLORIDE 5 MG/1
5 TABLET, FILM COATED ORAL DAILY
Qty: 90 TABLET | Refills: 1 | Status: SHIPPED | OUTPATIENT
Start: 2022-10-11

## 2022-10-20 ENCOUNTER — OFFICE VISIT (OUTPATIENT)
Dept: OPHTHALMOLOGY | Facility: CLINIC | Age: 67
End: 2022-10-20
Payer: COMMERCIAL

## 2022-10-20 DIAGNOSIS — H43.393 FLOATER, VITREOUS, BILATERAL: ICD-10-CM

## 2022-10-20 DIAGNOSIS — E11.9 DIABETES MELLITUS TYPE 2 WITHOUT RETINOPATHY (HCC): ICD-10-CM

## 2022-10-20 DIAGNOSIS — H53.002 AMBLYOPIA, LEFT: ICD-10-CM

## 2022-10-20 DIAGNOSIS — H25.13 AGE-RELATED NUCLEAR CATARACT OF BOTH EYES: ICD-10-CM

## 2022-10-20 DIAGNOSIS — H40.1132 PRIMARY OPEN ANGLE GLAUCOMA (POAG) OF BOTH EYES, MODERATE STAGE: Primary | ICD-10-CM

## 2022-10-20 PROCEDURE — 92015 DETERMINE REFRACTIVE STATE: CPT | Performed by: OPHTHALMOLOGY

## 2022-10-20 PROCEDURE — 2023F DILAT RTA XM W/O RTNOPTHY: CPT | Performed by: OPHTHALMOLOGY

## 2022-10-20 PROCEDURE — 92014 COMPRE OPH EXAM EST PT 1/>: CPT | Performed by: OPHTHALMOLOGY

## 2022-10-20 NOTE — PATIENT INSTRUCTIONS
Primary open angle glaucoma (POAG) of both eyes, moderate stage  Intraocular pressure stable, tolerating medications. Will continue same regimen of Latanoprost at bedtime in both eyes. Will have patient back in 4 months for a pressure check. Amblyopia, left  No treatment. Age-related nuclear cataract of both eyes  Discussed mild cataracts in both eyes that are not affecting vision and are not surgical at this time. Diabetes mellitus type 2 without retinopathy ([facility] [address]  Diabetes type II: no background of retinopathy, no signs of neovascularization noted. Discussed ocular and systemic benefits of blood sugar control. Diagnosis and treatment discussed in detail with patient. Floater, vitreous, bilateral  No treatment.

## 2022-10-27 ENCOUNTER — IMMUNIZATION (OUTPATIENT)
Dept: LAB | Age: 67
End: 2022-10-27
Attending: EMERGENCY MEDICINE
Payer: COMMERCIAL

## 2022-10-27 DIAGNOSIS — Z23 NEED FOR VACCINATION: Primary | ICD-10-CM

## 2022-10-27 PROCEDURE — 0134A SARSCOV2 VAC BVL 50MCG/0.5ML: CPT

## 2022-11-14 ENCOUNTER — OFFICE VISIT (OUTPATIENT)
Dept: INTERNAL MEDICINE CLINIC | Facility: CLINIC | Age: 67
End: 2022-11-14
Payer: COMMERCIAL

## 2022-11-14 ENCOUNTER — TELEPHONE (OUTPATIENT)
Dept: INTERNAL MEDICINE CLINIC | Facility: CLINIC | Age: 67
End: 2022-11-14

## 2022-11-14 VITALS
HEIGHT: 66 IN | WEIGHT: 230 LBS | BODY MASS INDEX: 36.96 KG/M2 | SYSTOLIC BLOOD PRESSURE: 131 MMHG | HEART RATE: 60 BPM | DIASTOLIC BLOOD PRESSURE: 78 MMHG

## 2022-11-14 DIAGNOSIS — Z23 IMMUNIZATION DUE: Primary | ICD-10-CM

## 2022-11-14 PROCEDURE — 99397 PER PM REEVAL EST PAT 65+ YR: CPT | Performed by: NURSE PRACTITIONER

## 2022-11-14 PROCEDURE — 3008F BODY MASS INDEX DOCD: CPT | Performed by: NURSE PRACTITIONER

## 2022-11-14 PROCEDURE — 90715 TDAP VACCINE 7 YRS/> IM: CPT | Performed by: NURSE PRACTITIONER

## 2022-11-14 PROCEDURE — 90471 IMMUNIZATION ADMIN: CPT | Performed by: NURSE PRACTITIONER

## 2022-11-14 PROCEDURE — 3075F SYST BP GE 130 - 139MM HG: CPT | Performed by: NURSE PRACTITIONER

## 2022-11-14 PROCEDURE — 3078F DIAST BP <80 MM HG: CPT | Performed by: NURSE PRACTITIONER

## 2022-11-14 PROCEDURE — 90472 IMMUNIZATION ADMIN EACH ADD: CPT | Performed by: NURSE PRACTITIONER

## 2022-11-14 PROCEDURE — 90662 IIV NO PRSV INCREASED AG IM: CPT | Performed by: NURSE PRACTITIONER

## 2022-11-14 RX ORDER — BETAMETHASONE DIPROPIONATE 0.05 %
OINTMENT (GRAM) TOPICAL
Qty: 50 G | Refills: 5 | Status: SHIPPED | OUTPATIENT
Start: 2022-11-14

## 2022-11-14 NOTE — TELEPHONE ENCOUNTER
Available Formulary Alternatives: desoximetasone (except desoximetasone ointment 0.05%), fluocinonide (except fluocinonide cream 0.1%), BRYHALI

## 2022-12-01 ENCOUNTER — LAB ENCOUNTER (OUTPATIENT)
Dept: LAB | Age: 67
End: 2022-12-01
Attending: INTERNAL MEDICINE
Payer: COMMERCIAL

## 2022-12-01 DIAGNOSIS — E11.42 CONTROLLED TYPE 2 DIABETES MELLITUS WITH DIABETIC POLYNEUROPATHY, WITHOUT LONG-TERM CURRENT USE OF INSULIN (HCC): ICD-10-CM

## 2022-12-01 DIAGNOSIS — Z00.00 ROUTINE HEALTH MAINTENANCE: ICD-10-CM

## 2022-12-01 LAB
ANION GAP SERPL CALC-SCNC: 8 MMOL/L (ref 0–18)
BASOPHILS # BLD AUTO: 0.02 X10(3) UL (ref 0–0.2)
BASOPHILS NFR BLD AUTO: 0.2 %
BUN BLD-MCNC: 16 MG/DL (ref 7–18)
BUN/CREAT SERPL: 12.5 (ref 10–20)
CALCIUM BLD-MCNC: 9.7 MG/DL (ref 8.5–10.1)
CHLORIDE SERPL-SCNC: 104 MMOL/L (ref 98–112)
CO2 SERPL-SCNC: 29 MMOL/L (ref 21–32)
COMPLEXED PSA SERPL-MCNC: 2.25 NG/ML (ref ?–4)
CREAT BLD-MCNC: 1.28 MG/DL
CREAT UR-SCNC: 193 MG/DL
DEPRECATED RDW RBC AUTO: 47.8 FL (ref 35.1–46.3)
EOSINOPHIL # BLD AUTO: 0.27 X10(3) UL (ref 0–0.7)
EOSINOPHIL NFR BLD AUTO: 3.1 %
ERYTHROCYTE [DISTWIDTH] IN BLOOD BY AUTOMATED COUNT: 14.1 % (ref 11–15)
EST. AVERAGE GLUCOSE BLD GHB EST-MCNC: 166 MG/DL (ref 68–126)
FASTING STATUS PATIENT QL REPORTED: YES
GFR SERPLBLD BASED ON 1.73 SQ M-ARVRAT: 61 ML/MIN/1.73M2 (ref 60–?)
GLUCOSE BLD-MCNC: 102 MG/DL (ref 70–99)
HBA1C MFR BLD: 7.4 % (ref ?–5.7)
HCT VFR BLD AUTO: 39.7 %
HGB BLD-MCNC: 13 G/DL
IMM GRANULOCYTES # BLD AUTO: 0.02 X10(3) UL (ref 0–1)
IMM GRANULOCYTES NFR BLD: 0.2 %
LYMPHOCYTES # BLD AUTO: 2.06 X10(3) UL (ref 1–4)
LYMPHOCYTES NFR BLD AUTO: 23.3 %
MCH RBC QN AUTO: 30 PG (ref 26–34)
MCHC RBC AUTO-ENTMCNC: 32.7 G/DL (ref 31–37)
MCV RBC AUTO: 91.5 FL
MICROALBUMIN UR-MCNC: 0.93 MG/DL
MICROALBUMIN/CREAT 24H UR-RTO: 4.8 UG/MG (ref ?–30)
MONOCYTES # BLD AUTO: 0.76 X10(3) UL (ref 0.1–1)
MONOCYTES NFR BLD AUTO: 8.6 %
NEUTROPHILS # BLD AUTO: 5.7 X10 (3) UL (ref 1.5–7.7)
NEUTROPHILS # BLD AUTO: 5.7 X10(3) UL (ref 1.5–7.7)
NEUTROPHILS NFR BLD AUTO: 64.6 %
OSMOLALITY SERPL CALC.SUM OF ELEC: 293 MOSM/KG (ref 275–295)
PLATELET # BLD AUTO: 302 10(3)UL (ref 150–450)
POTASSIUM SERPL-SCNC: 3.9 MMOL/L (ref 3.5–5.1)
RBC # BLD AUTO: 4.34 X10(6)UL
SODIUM SERPL-SCNC: 141 MMOL/L (ref 136–145)
TSI SER-ACNC: 1.28 MIU/ML (ref 0.36–3.74)
VIT B12 SERPL-MCNC: 535 PG/ML (ref 193–986)
WBC # BLD AUTO: 8.8 X10(3) UL (ref 4–11)

## 2022-12-01 PROCEDURE — 3061F NEG MICROALBUMINURIA REV: CPT | Performed by: INTERNAL MEDICINE

## 2022-12-01 PROCEDURE — 82607 VITAMIN B-12: CPT

## 2022-12-01 PROCEDURE — 83036 HEMOGLOBIN GLYCOSYLATED A1C: CPT

## 2022-12-01 PROCEDURE — 80048 BASIC METABOLIC PNL TOTAL CA: CPT

## 2022-12-01 PROCEDURE — 3051F HG A1C>EQUAL 7.0%<8.0%: CPT | Performed by: INTERNAL MEDICINE

## 2022-12-01 PROCEDURE — 82570 ASSAY OF URINE CREATININE: CPT

## 2022-12-01 PROCEDURE — 84443 ASSAY THYROID STIM HORMONE: CPT

## 2022-12-01 PROCEDURE — 82043 UR ALBUMIN QUANTITATIVE: CPT

## 2022-12-01 PROCEDURE — 85025 COMPLETE CBC W/AUTO DIFF WBC: CPT

## 2022-12-01 PROCEDURE — 36415 COLL VENOUS BLD VENIPUNCTURE: CPT

## 2022-12-06 ENCOUNTER — OFFICE VISIT (OUTPATIENT)
Facility: CLINIC | Age: 67
End: 2022-12-06
Payer: COMMERCIAL

## 2022-12-06 VITALS
HEIGHT: 66 IN | RESPIRATION RATE: 18 BRPM | BODY MASS INDEX: 36.96 KG/M2 | DIASTOLIC BLOOD PRESSURE: 52 MMHG | SYSTOLIC BLOOD PRESSURE: 100 MMHG | WEIGHT: 230 LBS | HEART RATE: 55 BPM | OXYGEN SATURATION: 98 %

## 2022-12-06 DIAGNOSIS — I10 ESSENTIAL HYPERTENSION: ICD-10-CM

## 2022-12-06 DIAGNOSIS — N23 KIDNEY PAIN: Primary | ICD-10-CM

## 2022-12-06 DIAGNOSIS — E11.42 CONTROLLED TYPE 2 DIABETES MELLITUS WITH DIABETIC POLYNEUROPATHY, WITHOUT LONG-TERM CURRENT USE OF INSULIN (HCC): ICD-10-CM

## 2022-12-06 PROBLEM — L98.491 SKIN ULCER, LIMITED TO BREAKDOWN OF SKIN (HCC): Status: RESOLVED | Noted: 2022-07-05 | Resolved: 2022-12-06

## 2022-12-06 PROBLEM — R21 RASH: Status: RESOLVED | Noted: 2021-06-15 | Resolved: 2022-12-06

## 2022-12-06 PROBLEM — Z23 IMMUNIZATION DUE: Status: RESOLVED | Noted: 2022-11-14 | Resolved: 2022-12-06

## 2022-12-06 LAB
APPEARANCE: CLEAR
BILIRUBIN: NEGATIVE
GLUCOSE (URINE DIPSTICK): 500 MG/DL
KETONES (URINE DIPSTICK): NEGATIVE MG/DL
LEUKOCYTES: NEGATIVE
MULTISTIX LOT#: ABNORMAL NUMERIC
NITRITE, URINE: NEGATIVE
OCCULT BLOOD: NEGATIVE
PH, URINE: 7 (ref 4.5–8)
PROTEIN (URINE DIPSTICK): NEGATIVE MG/DL
SPECIFIC GRAVITY: 1.01 (ref 1–1.03)
URINE-COLOR: YELLOW
UROBILINOGEN,SEMI-QN: 0.2 MG/DL (ref 0–1.9)

## 2022-12-06 PROCEDURE — 3078F DIAST BP <80 MM HG: CPT | Performed by: INTERNAL MEDICINE

## 2022-12-06 PROCEDURE — 99214 OFFICE O/P EST MOD 30 MIN: CPT | Performed by: INTERNAL MEDICINE

## 2022-12-06 PROCEDURE — 3074F SYST BP LT 130 MM HG: CPT | Performed by: INTERNAL MEDICINE

## 2022-12-06 PROCEDURE — 81002 URINALYSIS NONAUTO W/O SCOPE: CPT | Performed by: INTERNAL MEDICINE

## 2022-12-06 PROCEDURE — 3008F BODY MASS INDEX DOCD: CPT | Performed by: INTERNAL MEDICINE

## 2022-12-06 RX ORDER — METOPROLOL SUCCINATE 100 MG/1
100 TABLET, EXTENDED RELEASE ORAL EVERY EVENING
Qty: 90 TABLET | Refills: 1 | Status: SHIPPED | OUTPATIENT
Start: 2022-12-06

## 2022-12-06 RX ORDER — LOSARTAN POTASSIUM 100 MG/1
100 TABLET ORAL DAILY
Qty: 90 TABLET | Refills: 1 | Status: SHIPPED | OUTPATIENT
Start: 2022-12-06

## 2022-12-06 NOTE — ASSESSMENT & PLAN NOTE
Patient complained of some back pain last week which was different from his normal musculoskeletal back pain. He thought this was due to his kidneys. Kidney function is normal.  Urinalysis is normal.  His pain is improving. Follow-up as needed.

## 2022-12-06 NOTE — ASSESSMENT & PLAN NOTE
Reviewed labs with patient. A1c came down slightly to 7.4. Urine microalbumin was negative. Kidney function normal.  Continue present management. Follow-up 3 months. Mara Walker

## 2022-12-28 DIAGNOSIS — I25.10 ATHEROSCLEROSIS OF NATIVE CORONARY ARTERY OF NATIVE HEART WITHOUT ANGINA PECTORIS: ICD-10-CM

## 2022-12-28 DIAGNOSIS — K21.9 GASTROESOPHAGEAL REFLUX DISEASE: ICD-10-CM

## 2022-12-28 RX ORDER — OMEPRAZOLE 20 MG/1
CAPSULE, DELAYED RELEASE ORAL
Qty: 90 CAPSULE | Refills: 1 | Status: SHIPPED | OUTPATIENT
Start: 2022-12-28

## 2022-12-28 RX ORDER — ATORVASTATIN CALCIUM 20 MG/1
TABLET, FILM COATED ORAL
Qty: 90 TABLET | Refills: 1 | Status: SHIPPED | OUTPATIENT
Start: 2022-12-28

## 2022-12-28 NOTE — TELEPHONE ENCOUNTER
Refill passed per [address] protocol.   Requested Prescriptions   Pending Prescriptions Disp Refills    OMEPRAZOLE 20 MG Oral Capsule Delayed Release [Pharmacy Med Name: OMEPRAZOLE DR 20 MG CAPSULE] 90 capsule 1     Sig: TAKE 1 CAPSULE BY MOUTH EVERY DAY IN THE MORNING       Gastrointestional Medication Protocol Passed - 12/28/2022 12:13 AM        Passed - In person appointment or virtual visit in the past 12 mos or appointment in next 3 mos     Recent Outpatient Visits              3 weeks ago Kidney pain    [address], [address] [facility], Lore Dandy, MD    Office Visit    1 month ago Immunization due    [address], [address], [place], ISIDORO Lopez    Office Visit    2 months ago Primary open angle glaucoma (POAG) of both eyes, moderate stage    [facility] Haresh Alvares MD    Office Visit    3 months ago Controlled type 2 diabetes mellitus with diabetic polyneuropathy, without long-term current use of insulin [facility])    [address], [address] [facility], Lore Dandy, MD    Office Visit    4 months ago Lichen planus    [facility] Maricruz Tuttle PA-C    Office Visit          Future Appointments         Provider Department Appt Notes    In 2 months Haresh Alvares MD [facility] EP/ 4 mos     In 3 months Ty Sim MD [address], [facility] - 3rd floor, [facility]                 ATORVASTATIN 20 MG Oral Tab [Pharmacy Med Name: ATORVASTATIN 20 MG TABLET] 90 tablet 1     Sig: TAKE 1 TABLET BY MOUTH EVERY DAY       Cholesterol Medication Protocol Passed - 12/28/2022 12:13 AM        Passed - ALT in past 12 months        Passed - LDL in past 12 months        Passed - Last ALT < 80     Lab Results   Component Value Date    ALT 35 09/21/2022             Passed - Last LDL < 130     Lab Results   Component Value Date    LDL 57 06/29/2022             Passed - In person appointment or virtual visit in the past 12 mos or appointment in next 3 mos     Recent Outpatient Visits              3 weeks ago Kidney pain    [address], [address], Lorie Freedman MD    Office Visit    1 month ago Immunization due    [address], [address], [place], [place], APRN    Office Visit    2 months ago Primary open angle glaucoma (POAG) of both eyes, moderate stage    [facility] Emily Ovalle MD    Office Visit    3 months ago Controlled type 2 diabetes mellitus with diabetic polyneuropathy, without long-term current use of insulin [facility])    [address], [address], Lorie Freedman MD    Office Visit    4 months ago Lichen planus    [facility]ermat[facility] Michele Stearns PA-C    Office Visit          Future Appointments         Provider Department Appt Notes    In 2 months Emily Ovalle MD [facility] EP/ 4 mos     In 3 months Danni Zhu MD [address], [address], [address] [place] [address] Outpatient Visits              3 weeks ago Kidney pain    [address], [address] arianna, Lorie Freedman MD    Office Visit    1 month ago Immunization due    [address], [address], [place], [place], APRN    Office Visit    2 months ago Primary open angle glaucoma (POAG) of both eyes, moderate stage    [facility] Emily Ovalle MD    Office Visit    3 months ago Controlled type 2 diabetes mellitus with diabetic polyneuropathy, without long-term current use of insulin [facility])    [address], [address], Lorie Freedman MD    Office Visit    4 months ago Lichen planus    [facility] Dermatology Michele Stearns PA-C    Office Visit          Future Appointments         Provider Department Appt Notes    In 2 months Manoj Dial MD [facility] EP/ 4 mos     In 3 months Amy Simpson MD [facility], [facility], [address] [facility]

## 2023-02-20 DIAGNOSIS — E11.42 CONTROLLED TYPE 2 DIABETES MELLITUS WITH DIABETIC POLYNEUROPATHY, WITHOUT LONG-TERM CURRENT USE OF INSULIN (HCC): ICD-10-CM

## 2023-02-20 RX ORDER — GABAPENTIN 300 MG/1
300 CAPSULE ORAL 3 TIMES DAILY PRN
Qty: 270 CAPSULE | Refills: 1 | Status: SHIPPED | OUTPATIENT
Start: 2023-02-20

## 2023-02-20 NOTE — TELEPHONE ENCOUNTER
Refill passed per [place] protocol. Requested Prescriptions   Pending Prescriptions Disp Refills    GABAPENTIN 300 MG Oral Cap [Pharmacy Med Name: GABAPENTIN 300 MG CAPSULE] 270 capsule 1     Sig: TAKE 1 CAPSULE BY MOUTH 3 TIMES DAILY AS NEEDED.        Neurology Medications Passed - 2/20/2023  9:09 AM        Passed - In person appointment or virtual visit in the past 6 mos or appointment in next 3 mos     Recent Outpatient Visits              2 months ago Kidney pain    [facility], [address] Nomi Lovett MD    Office Visit    3 months ago Immunization due    [address] [facility], [place], [place], APRN    Office Visit    4 months ago Primary open angle glaucoma (POAG) of both eyes, moderate stage    [facility], [address], Melquiades Jimenez MD    Office Visit    4 months ago Controlled type 2 diabetes mellitus with diabetic polyneuropathy, without long-term current use of insulin [facility])    [facility], Ilene Shafer MD    Office Visit    6 months ago Lichen planus    [address] [facility], Pita Escalera PA-C    Office Visit          Future Appointments         Provider Department Appt Notes    In 2 days MD Christian Olivo, [address], [facility] Lower back pain    In 1 week MD Christian Anderson, [address], [facility] EP/ 4 mos     In 1 month MD Christian Calles, [address], [address]

## 2023-02-22 ENCOUNTER — OFFICE VISIT (OUTPATIENT)
Dept: PHYSICAL MEDICINE AND REHAB | Facility: CLINIC | Age: 68
End: 2023-02-22
Payer: COMMERCIAL

## 2023-02-22 VITALS
HEIGHT: 66 IN | HEART RATE: 60 BPM | SYSTOLIC BLOOD PRESSURE: 130 MMHG | DIASTOLIC BLOOD PRESSURE: 70 MMHG | OXYGEN SATURATION: 98 % | BODY MASS INDEX: 36.54 KG/M2 | WEIGHT: 227.38 LBS | RESPIRATION RATE: 18 BRPM

## 2023-02-22 DIAGNOSIS — G57.12 MERALGIA PARESTHETICA OF LEFT SIDE: ICD-10-CM

## 2023-02-22 DIAGNOSIS — G89.29 CHRONIC LOW BACK PAIN, UNSPECIFIED BACK PAIN LATERALITY, UNSPECIFIED WHETHER SCIATICA PRESENT: Primary | ICD-10-CM

## 2023-02-22 DIAGNOSIS — M16.12 PRIMARY OSTEOARTHRITIS OF LEFT HIP: ICD-10-CM

## 2023-02-22 DIAGNOSIS — M54.50 CHRONIC LOW BACK PAIN, UNSPECIFIED BACK PAIN LATERALITY, UNSPECIFIED WHETHER SCIATICA PRESENT: Primary | ICD-10-CM

## 2023-02-22 PROCEDURE — 3075F SYST BP GE 130 - 139MM HG: CPT | Performed by: PHYSICAL MEDICINE & REHABILITATION

## 2023-02-22 PROCEDURE — 3078F DIAST BP <80 MM HG: CPT | Performed by: PHYSICAL MEDICINE & REHABILITATION

## 2023-02-22 PROCEDURE — 99214 OFFICE O/P EST MOD 30 MIN: CPT | Performed by: PHYSICAL MEDICINE & REHABILITATION

## 2023-02-22 PROCEDURE — 3008F BODY MASS INDEX DOCD: CPT | Performed by: PHYSICAL MEDICINE & REHABILITATION

## 2023-02-22 RX ORDER — CYCLOBENZAPRINE HCL 10 MG
10 TABLET ORAL NIGHTLY
Qty: 30 TABLET | Refills: 0 | Status: SHIPPED | OUTPATIENT
Start: 2023-02-22 | End: 2023-03-24

## 2023-02-22 RX ORDER — METHYLPREDNISOLONE 4 MG/1
TABLET ORAL
COMMUNITY
Start: 2023-02-02 | End: 2023-02-22 | Stop reason: ALTCHOICE

## 2023-03-02 ENCOUNTER — OFFICE VISIT (OUTPATIENT)
Dept: OPHTHALMOLOGY | Facility: CLINIC | Age: 68
End: 2023-03-02

## 2023-03-02 DIAGNOSIS — H40.1132 PRIMARY OPEN ANGLE GLAUCOMA (POAG) OF BOTH EYES, MODERATE STAGE: Primary | ICD-10-CM

## 2023-03-02 PROCEDURE — 99213 OFFICE O/P EST LOW 20 MIN: CPT | Performed by: OPHTHALMOLOGY

## 2023-03-02 NOTE — PATIENT INSTRUCTIONS
Primary open angle glaucoma (POAG) of both eyes, moderate stage  Intraocular pressure stable, tolerating medications. Will continue same regimen of Latanoprost at bedtime in both eyes. Will have patient back in 4 months for a visual field, OCT and pressure check.

## 2023-03-02 NOTE — ASSESSMENT & PLAN NOTE
Intraocular pressure stable, tolerating medications. Will continue same regimen of Latanoprost at bedtime in both eyes. Will have patient back in 4 months for a visual field, OCT and pressure check.

## 2023-03-08 ENCOUNTER — MED REC SCAN ONLY (OUTPATIENT)
Dept: PHYSICAL MEDICINE AND REHAB | Facility: CLINIC | Age: 68
End: 2023-03-08

## 2023-03-21 DIAGNOSIS — I25.10 ATHEROSCLEROSIS OF NATIVE CORONARY ARTERY OF NATIVE HEART WITHOUT ANGINA PECTORIS: ICD-10-CM

## 2023-03-22 RX ORDER — ATORVASTATIN CALCIUM 20 MG/1
20 TABLET, FILM COATED ORAL DAILY
Qty: 90 TABLET | Refills: 1 | OUTPATIENT
Start: 2023-03-22

## 2023-03-27 RX ORDER — CYCLOBENZAPRINE HCL 10 MG
TABLET ORAL
Qty: 30 TABLET | Refills: 0 | Status: SHIPPED | OUTPATIENT
Start: 2023-03-27

## 2023-03-27 NOTE — TELEPHONE ENCOUNTER
Refill Request    Medication request: cyclobenzaprine 10 MG Oral Tab. Take 1 tablet (10 mg total) by mouth nightly. IEH:[id number]/[id number] Kwame Mendosa MD   Due back to clinic per last office note: Per Dr. Zoë Mcclure: Lisandra Alvares in 4 weeks. \"  NOV: 4/4/2023 Kwame Mendosa MD      [place]: 02/22/2023 #30    Urine drug screen (if applicable): n/a  Pain contract: n/a    LOV plan (if weaning or changing medications): Per Dr. Zoë Mcclure: Uriel Hooks recommending PT and Flexeril. \"

## 2023-04-04 ENCOUNTER — TELEPHONE (OUTPATIENT)
Dept: PHYSICAL MEDICINE AND REHAB | Facility: CLINIC | Age: 68
End: 2023-04-04

## 2023-04-04 ENCOUNTER — OFFICE VISIT (OUTPATIENT)
Dept: PHYSICAL MEDICINE AND REHAB | Facility: CLINIC | Age: 68
End: 2023-04-04
Payer: COMMERCIAL

## 2023-04-04 VITALS — HEART RATE: 60 BPM | DIASTOLIC BLOOD PRESSURE: 77 MMHG | SYSTOLIC BLOOD PRESSURE: 134 MMHG

## 2023-04-04 DIAGNOSIS — I25.10 ATHEROSCLEROSIS OF NATIVE CORONARY ARTERY OF NATIVE HEART WITHOUT ANGINA PECTORIS: ICD-10-CM

## 2023-04-04 DIAGNOSIS — E11.42 CONTROLLED TYPE 2 DIABETES MELLITUS WITH DIABETIC POLYNEUROPATHY, WITHOUT LONG-TERM CURRENT USE OF INSULIN (HCC): ICD-10-CM

## 2023-04-04 DIAGNOSIS — M47.816 LUMBAR SPONDYLOSIS: Primary | ICD-10-CM

## 2023-04-04 DIAGNOSIS — K21.9 GASTROESOPHAGEAL REFLUX DISEASE, UNSPECIFIED WHETHER ESOPHAGITIS PRESENT: ICD-10-CM

## 2023-04-04 PROCEDURE — 3078F DIAST BP <80 MM HG: CPT | Performed by: PHYSICAL MEDICINE & REHABILITATION

## 2023-04-04 PROCEDURE — 3075F SYST BP GE 130 - 139MM HG: CPT | Performed by: PHYSICAL MEDICINE & REHABILITATION

## 2023-04-04 PROCEDURE — 99214 OFFICE O/P EST MOD 30 MIN: CPT | Performed by: PHYSICAL MEDICINE & REHABILITATION

## 2023-04-04 NOTE — TELEPHONE ENCOUNTER
Patient has been scheduled for  Left L3-4, L4-5 and L5-S1 facet injections  on 04/13/23 at the [facility] with [address] -Anesthesia type: LOCAL. -If scheduling [facility] covid testing required for all procedures whether patient is vaccinated or not. -Patient informed not to eat or drink anything after midnight the night prior to the procedure, if being sedated. (Patient informed to fast 12 hours prior to procedure with IVCS/MAC. )  -Patient was advised that if he/she does receive the covid vaccine it needs to be at least 2 weeks before or after the injection. -Medications and allergies reviewed. -Patient reminded to hold NSAIDs (Ibuprofen, ASA 81, Aleve, Naproxen, Mobic, Diclofenac, Etodolac, Celebrex etc.) for 3 days prior to [place]  if BMI is greater than 35. For Cervical injections only hold multivitamins, Vitamin E, Fish Oil, Phentermine (Lomaira) for 7 days prior to injection and NSAIDS.  mg to be held for 7 days prior to injections.  -If patient is receiving MAC/IVCS Phentermine Evaristo Rebel) will need to be held for 7 days prior to injection.  -If on blood thinner clearance has been received to hold this medication by provider.   -Patient informed he/she will need a  to and from procedure. -[facility] is located in the [facility] 1st floor. Patient may park in the yellow or purple parking. Patient verbalized understanding and agrees with plan.  -----> Scheduled in Epic: Yes  -----> Scheduled in [employer]:  Yes

## 2023-04-04 NOTE — TELEPHONE ENCOUNTER
Initiated authorization for Left L3-4, L4-5 and L5-S1 facet injections CPT [id number], [id number], [id number] dx:M47.816 to be done at [facility] with [employer]  Status: Approved-authorization is not required per health plan

## 2023-04-13 ENCOUNTER — OFFICE VISIT (OUTPATIENT)
Dept: SURGERY | Facility: CLINIC | Age: 68
End: 2023-04-13
Payer: COMMERCIAL

## 2023-04-13 DIAGNOSIS — M47.816 LUMBAR SPONDYLOSIS: Primary | ICD-10-CM

## 2023-04-13 PROCEDURE — 64495 INJ PARAVERT F JNT L/S 3 LEV: CPT | Performed by: PHYSICAL MEDICINE & REHABILITATION

## 2023-04-13 PROCEDURE — 64493 INJ PARAVERT F JNT L/S 1 LEV: CPT | Performed by: PHYSICAL MEDICINE & REHABILITATION

## 2023-04-13 PROCEDURE — 64494 INJ PARAVERT F JNT L/S 2 LEV: CPT | Performed by: PHYSICAL MEDICINE & REHABILITATION

## 2023-04-13 NOTE — PROCEDURES
Preoperative Diagnosis:  (M47.816) Lumbar spondylosis  (primary encounter diagnosis)       Postoperative Diagnosis:  (M47.816) Lumbar spondylosis  (primary encounter diagnosis)       Procedures:  Left L3-4, L4-5 and L5-S1 Facet injection(s) under fluoroscopic guidance and contrast enhancement. Surgeon:  Anuj Ferro M.D. Anesthesia:  Local      OPERATIVE PROCEDURE:  The patient was consented. He was brought into the operating room and placed on the fluoroscopy table in the prone position. He was sterilely prepped and draped in routine fashion. The facet joints were identified. The overlying skin was anesthetized. 22-gauge spinal needles were introduced and directed towards the Left L3-4, L4-5 and L5-S1 facet joint(s). Needle position was verified using fluoroscopy and radiographic contrast showing a facet arthrogram.  Radiographic interpretation was that the needle was in proper position for facet injection(s). I placed a mixture of 0.5mL of 6mg/mL Celestone and 0.5mL of 1% preservative-free lidocaine into each joint. The patient tolerated the procedure well without any immediate complications. He was given discharge instructions and is to follow up with me in approximately two weeks.

## 2023-04-17 ENCOUNTER — OFFICE VISIT (OUTPATIENT)
Facility: CLINIC | Age: 68
End: 2023-04-17

## 2023-04-17 VITALS
HEIGHT: 66 IN | HEART RATE: 54 BPM | SYSTOLIC BLOOD PRESSURE: 130 MMHG | WEIGHT: 234.38 LBS | BODY MASS INDEX: 37.67 KG/M2 | RESPIRATION RATE: 18 BRPM | OXYGEN SATURATION: 96 % | DIASTOLIC BLOOD PRESSURE: 68 MMHG

## 2023-04-17 DIAGNOSIS — E11.42 TYPE 2 DIABETES MELLITUS WITH DIABETIC POLYNEUROPATHY, WITHOUT LONG-TERM CURRENT USE OF INSULIN (HCC): Primary | ICD-10-CM

## 2023-04-17 DIAGNOSIS — I10 ESSENTIAL HYPERTENSION: ICD-10-CM

## 2023-04-17 PROBLEM — E66.01 MORBID (SEVERE) OBESITY DUE TO EXCESS CALORIES (HCC): Status: ACTIVE | Noted: 2023-04-17

## 2023-04-17 PROBLEM — N23 KIDNEY PAIN: Status: RESOLVED | Noted: 2022-12-06 | Resolved: 2023-04-17

## 2023-04-17 LAB
CARTRIDGE LOT#: 567 NUMERIC
HEMOGLOBIN A1C: 7.2 % (ref 4.3–5.6)

## 2023-04-17 PROCEDURE — 3008F BODY MASS INDEX DOCD: CPT | Performed by: INTERNAL MEDICINE

## 2023-04-17 PROCEDURE — 99214 OFFICE O/P EST MOD 30 MIN: CPT | Performed by: INTERNAL MEDICINE

## 2023-04-17 PROCEDURE — 3078F DIAST BP <80 MM HG: CPT | Performed by: INTERNAL MEDICINE

## 2023-04-17 PROCEDURE — 3075F SYST BP GE 130 - 139MM HG: CPT | Performed by: INTERNAL MEDICINE

## 2023-04-17 PROCEDURE — 3051F HG A1C>EQUAL 7.0%<8.0%: CPT | Performed by: INTERNAL MEDICINE

## 2023-04-17 PROCEDURE — 83036 HEMOGLOBIN GLYCOSYLATED A1C: CPT | Performed by: INTERNAL MEDICINE

## 2023-04-17 NOTE — PATIENT INSTRUCTIONS
Please schedule your next appointment in late November for a physical  Do fasting labs 2-4 days before that appointment. Fast for 12 hours. Water and meds are okay.    Do not take vitamins or supplements for 3 days prior to the blood test.

## 2023-05-09 ENCOUNTER — OFFICE VISIT (OUTPATIENT)
Dept: PHYSICAL MEDICINE AND REHAB | Facility: CLINIC | Age: 68
End: 2023-05-09
Payer: COMMERCIAL

## 2023-05-09 DIAGNOSIS — M47.816 LUMBAR SPONDYLOSIS: Primary | ICD-10-CM

## 2023-05-09 DIAGNOSIS — K21.9 GASTROESOPHAGEAL REFLUX DISEASE, UNSPECIFIED WHETHER ESOPHAGITIS PRESENT: ICD-10-CM

## 2023-05-09 DIAGNOSIS — E11.42 CONTROLLED TYPE 2 DIABETES MELLITUS WITH DIABETIC POLYNEUROPATHY, WITHOUT LONG-TERM CURRENT USE OF INSULIN (HCC): ICD-10-CM

## 2023-05-09 DIAGNOSIS — I25.10 ATHEROSCLEROSIS OF NATIVE CORONARY ARTERY OF NATIVE HEART WITHOUT ANGINA PECTORIS: ICD-10-CM

## 2023-05-09 PROCEDURE — 99213 OFFICE O/P EST LOW 20 MIN: CPT | Performed by: PHYSICAL MEDICINE & REHABILITATION

## 2023-05-12 RX ORDER — LATANOPROST 50 UG/ML
SOLUTION/ DROPS OPHTHALMIC
Qty: 7.5 ML | Refills: 3 | Status: SHIPPED | OUTPATIENT
Start: 2023-05-12

## 2023-05-12 NOTE — TELEPHONE ENCOUNTER
Pt was seen on 3/2/23- his next apt is scheduled on 7/20/23 for VF, OCT and IOP check. Rx pended to [facility].

## 2023-06-19 DIAGNOSIS — I10 ESSENTIAL HYPERTENSION: ICD-10-CM

## 2023-06-19 RX ORDER — METOPROLOL SUCCINATE 100 MG/1
100 TABLET, EXTENDED RELEASE ORAL EVERY EVENING
Qty: 90 TABLET | Refills: 1 | Status: SHIPPED | OUTPATIENT
Start: 2023-06-19

## 2023-06-19 NOTE — TELEPHONE ENCOUNTER
Please review; protocol failed. Requested Prescriptions   Pending Prescriptions Disp Refills    METOPROLOL SUCCINATE  MG Oral Tablet 24 Hr [Pharmacy Med Name: METOPROLOL SUCC  MG TAB] 90 tablet 1     Sig: TAKE 1 TABLET BY MOUTH EVERY EVENING. Hypertensive Medications Protocol Failed - 6/19/2023 12:17 AM        Failed - CMP or BMP in past 6 months     No results found for this or any previous visit (from the past 4392 hour(s)).             Passed - In person appointment in the past 12 or next 3 months     Recent Outpatient Visits              1 month ago Lumbar spondylosis    [address] [facility], Juan Gomez MD    Office Visit    2 months ago Type 2 diabetes mellitus with diabetic polyneuropathy, without long-term current use of insulin [facility])    [address], Navin Blackmon MD    Office Visit    2 months ago Lumbar spondylosis    [facility] Shannon Leiva MD    Office Visit    2 months ago Lumbar spondylosis    [address], [address] [facility],[address] Floor, Juan Gomez MD    Office Visit    3 months ago Primary open angle glaucoma (POAG) of both eyes, moderate stage    [address] [facility], Mechelle Maddox MD    Office Visit          Future Appointments         Provider Department Appt Notes    In 1 month Jacqueline Julien MD [address], [address], [place] EP/ VF, OCT and IOP               Passed - Last BP reading less than 140/90     BP Readings from Last 1 Encounters:  04/17/23 : 130/68              Passed - In person appointment or virtual visit in the past 6 months     Recent Outpatient Visits              1 month ago Lumbar spondylosis    [address] [facility], Sherren Dykes, MD    Office Visit    2 months ago Type 2 diabetes mellitus with diabetic polyneuropathy, without long-term current use of insulin [facility])    [address], Sonali Hooper MD    Office Visit    2 months ago Lumbar spondylosis    [facility] Huan Quintero MD    Office Visit    2 months ago Lumbar spondylosis    [address] [facility], Brittni Gomez MD    Office Visit    3 months ago Primary open angle glaucoma (POAG) of both eyes, moderate stage    [address], [address], Alberto Carrasco MD    Office Visit          Future Appointments         Provider Department Appt Notes    In 1 month Mark Gomez MD [address], [address], [place] EP/ VF, OCT and IOP               Passed - EGFRCR or GFRAA > 50     GFR Evaluation  EGFRCR: 61 , resulted on 12/1/2022             Future Appointments         Provider Department Appt Notes    In 1 month Mark Gomez MD [address], [address], [place] EP/ VF, OCT and IOP          Recent Outpatient Visits              1 month ago Lumbar spondylosis    [address] [facility], Brittni Gomez MD    Office Visit    2 months ago Type 2 diabetes mellitus with diabetic polyneuropathy, without long-term current use of insulin [facility])    [address], Sonali Hooper MD    Office Visit    2 months ago Lumbar spondylosis    [facility] Guy Lombard, MD    Office Visit    2 months ago Lumbar spondylosis    [address] [facility], Freda Galvan MD    Office Visit    3 months ago Primary open angle glaucoma (POAG) of both eyes, moderate stage    [address] [facility], Alberto Carrasco MD    Office Visit

## 2023-07-03 ENCOUNTER — APPOINTMENT (OUTPATIENT)
Dept: LAB | Age: 68
End: 2023-07-03
Attending: NURSE PRACTITIONER

## 2023-07-03 ENCOUNTER — OFFICE VISIT (OUTPATIENT)
Dept: OCCUPATIONAL MEDICINE | Age: 68
End: 2023-07-03
Attending: NURSE PRACTITIONER

## 2023-07-03 ENCOUNTER — HOSPITAL ENCOUNTER (OUTPATIENT)
Dept: GENERAL RADIOLOGY | Age: 68
Discharge: HOME | End: 2023-07-03
Attending: NURSE PRACTITIONER

## 2023-07-03 DIAGNOSIS — M79.671 RIGHT FOOT PAIN: Primary | ICD-10-CM

## 2023-07-03 DIAGNOSIS — M79.671 RIGHT FOOT PAIN: ICD-10-CM

## 2023-07-03 PROCEDURE — 73630 X-RAY EXAM OF FOOT: CPT | Performed by: NURSE PRACTITIONER

## 2023-07-03 RX ORDER — IBUPROFEN 600 MG/1
600 TABLET ORAL EVERY 6 HOURS PRN
Qty: 28 TABLET | Refills: 0 | Status: SHIPPED | OUTPATIENT
Start: 2023-07-03 | End: 2023-07-10

## 2023-07-03 RX ORDER — IBUPROFEN 600 MG/1
600 TABLET ORAL EVERY 6 HOURS PRN
Qty: 28 TABLET | Refills: 0 | Status: SHIPPED | OUTPATIENT
Start: 2023-07-03 | End: 2023-07-03

## 2023-07-06 NOTE — TELEPHONE ENCOUNTER
Please review: last prescribed by Jaime WEBSTER    Refill passed per [employer] protocol    Requested Prescriptions   Pending Prescriptions Disp Refills    LEVOCETIRIZINE 5 MG Oral Tab [Pharmacy Med Name: LEVOCETIRIZINE 5 MG TABLET] 90 tablet 1     Sig: Take 1 tablet (5 mg total) by mouth daily.        Allergy Medication Protocol Passed - 7/6/2023 12:17 AM        Passed - In person appointment or virtual visit in the past 12 mos or appointment in next 3 mos     Recent Outpatient Visits              3 days ago Right foot pain    [address] Visit    1 month ago Lumbar spondylosis    [address] [facility], Jose Juan Gomez MD    Office Visit    2 months ago Type 2 diabetes mellitus with diabetic polyneuropathy, without long-term current use of insulin [facility])    Ephraim Thompson MD    Office Visit    2 months ago Lumbar spondylosis    [facility] Deepa Alexander MD    Office Visit    3 months ago Lumbar spondylosis    [address] [facility], Keke Mejia MD    Office Visit          Future Appointments         Provider Department Appt Notes    In 2 weeks MD Shara Weiss, [address], [place] EP/ VF, OCT and IOP                    Future Appointments         Provider Department Appt Notes    In 2 weeks MD Shara Weiss, [address] [facility] [address], [place] EP/ VF, OCT and IOP            Recent Outpatient Visits              3 days ago Right foot pain    [address]    1 month ago Lumbar spondylosis    [address] [facility], Keke Mejia MD    Office Visit    2 months ago Type 2 diabetes mellitus with diabetic polyneuropathy, without long-term current use of insulin [facility])    [facility], [address] Angela Rodriguez MD    Office Visit    2 months ago Lumbar spondylosis    [facility] Jovan Montilla MD    Office Visit    3 months ago Lumbar spondylosis    Jason Stringer Ancel Citizen, MD    Office Visit

## 2023-07-07 RX ORDER — LEVOCETIRIZINE DIHYDROCHLORIDE 5 MG/1
5 TABLET, FILM COATED ORAL DAILY
Qty: 90 TABLET | Refills: 3 | Status: SHIPPED | OUTPATIENT
Start: 2023-07-07

## 2023-07-20 ENCOUNTER — OFFICE VISIT (OUTPATIENT)
Dept: OPHTHALMOLOGY | Facility: CLINIC | Age: 68
End: 2023-07-20

## 2023-07-20 DIAGNOSIS — I10 ESSENTIAL HYPERTENSION: ICD-10-CM

## 2023-07-20 DIAGNOSIS — H40.1132 PRIMARY OPEN ANGLE GLAUCOMA (POAG) OF BOTH EYES, MODERATE STAGE: Primary | ICD-10-CM

## 2023-07-20 PROCEDURE — 92133 CPTRZD OPH DX IMG PST SGM ON: CPT | Performed by: OPHTHALMOLOGY

## 2023-07-20 PROCEDURE — 92083 EXTENDED VISUAL FIELD XM: CPT | Performed by: OPHTHALMOLOGY

## 2023-07-20 PROCEDURE — 99213 OFFICE O/P EST LOW 20 MIN: CPT | Performed by: OPHTHALMOLOGY

## 2023-07-20 NOTE — PROGRESS NOTES
Srini Salazar is a 75year old male. HPI:     HPI    Pt in today for a VF, OCT and IOP check. Per pt is taking Latanoprost in both eyes at bedtime as directed. Pt states vision is stable and denies any ocular issues. Last edited by Lori Cardozo OT on 7/20/2023  8:32 AM.        Patient History:  Past Medical History:   Diagnosis Date    Age-related nuclear cataract of both eyes 06/30/2015    Amblyopia-left eye 06/30/2015    Anemia     Appendicitis     Diabetes type 2, controlled ([address] 06/30/2015    Floater, vitreous 06/30/2015    Glaucoma     High blood pressure     High myopia 06/30/2015    Knee injury 2012    Obesity, unspecified     Other and unspecified hyperlipidemia     Primary open angle glaucoma (POAG) of both eyes, moderate stage 06/12/2019    Start Latanoprost qhs OU    Type II or unspecified type diabetes mellitus without mention of complication, not stated as uncontrolled     Unspecified essential hypertension        Surgical History: Srini Salazar has a past surgical history that includes electrocardiogram, complete (11/28/2012) (scanned to media tab); appendectomy (2012); knee surgery; colonoscopy (2003); colonoscopy (12/18/2013); colonoscopy (N/A, 07/10/2020) (Procedure: COLONOSCOPY;  Surgeon: Itzel Patel MD;  Location: [address] [place] [address] ENDOSCOPY); arthroscopy of joint unlisted; tonsillectomy; and hip replacement surgery (Left, 07/09/2021).     Family History   Problem Relation Age of Onset    Heart Disease Mother     Diabetes Mother     Cancer Sister         ovarian    Ovarian Cancer Sister     Heart Disease Brother         x2    Cancer Brother         bladder cancer    Other (Other) Father         killed in steel mill accident    Breast Cancer Cousin     Lipids Neg         hyperlipidemia    Glaucoma Neg     Macular degeneration Neg        Social History:   Social History     Socioeconomic History    Marital status:    Tobacco Use    Smoking status: Former     Packs/day: 0.00 Years: 45.00     Pack years: 0.00     Types: Cigarettes     Start date: 5/3/2019     Quit date: 2019     Years since quittin.2    Smokeless tobacco: Never   Vaping Use    Vaping Use: Never used   Substance and Sexual Activity    Alcohol use: Not Currently    Drug use: No    Sexual activity: Yes   Other Topics Concern    Caffeine Concern No     Comment: coffee 2 cups    Exercise No     Comment: yardwork   Social History Narrative    The patient does not use an assistive device. .      The patient does live in a home with stairs. Medications:  Current Outpatient Medications   Medication Sig Dispense Refill    LATANOPROST 0.005 % Ophthalmic Solution USE 1 DROP IN BOTH EYES NIGHTLY 7.5 mL 3    triamcinolone 0.1 % External Ointment Apply 1 Application topically 2 (two) times daily. APPLY TO AFFECTED AREA 80 g 1    levocetirizine 5 MG Oral Tab Take 1 tablet (5 mg total) by mouth daily. 90 tablet 3    omeprazole 20 MG Oral Capsule Delayed Release TAKE 1 CAPSULE BY MOUTH EVERY DAY IN THE MORNING 90 capsule 3    metoprolol succinate  MG Oral Tablet 24 Hr Take 1 tablet (100 mg total) by mouth every evening. 90 tablet 1    atorvastatin 20 MG Oral Tab Take 1 tablet (20 mg total) by mouth daily. 90 tablet 3    dapagliflozin (FARXIGA) 10 MG Oral Tab Take 1 tablet (10 mg total) by mouth daily. 90 tablet 1    CYCLOBENZAPRINE 10 MG Oral Tab TAKE 1 TABLET BY MOUTH EVERY DAY AT NIGHT 30 tablet 0    metFORMIN  MG Oral Tablet 24 Hr Take 4 tablets (2,000 mg total) by mouth daily with breakfast. 360 tablet 3    gabapentin 300 MG Oral Cap Take 1 capsule (300 mg total) by mouth 3 (three) times daily as needed. 270 capsule 1    Glucose Blood (ACCU-CHEK GUIDE) In Vitro Strip To monitor blood sugar three times daily 300 strip 3    losartan 100 MG Oral Tab Take 1 tablet (100 mg total) by mouth daily.  90 tablet 1    AMLODIPINE 10 MG Oral Tab TAKE 1 TABLET BY MOUTH EVERY DAY 90 tablet 1    tacrolimus 0.1 % External Ointment Apply 1 Application topically 2 (two) times daily. APPLY TO AFFECTED AREA 60 g 3    clobetasol 0.05 % External Ointment Apply 1 Application topically daily as needed. Apply to both arms and foot 2x/day as needed 120 g 1    mupirocin 2 % External Ointment Apply 1 Application topically 3 (three) times daily. 30 g 0    Accu-Chek FastClix Lancets Does not apply Misc To monitor blood sugar three times daily 300 each 3    aspirin 81 MG Oral Tab EC Take 1 tablet (81 mg total) by mouth daily. Allergies:    Cymbalta [Duloxetin*    RASH    ROS:       PHYSICAL EXAM:     Base Eye Exam       Visual Acuity (Snellen - Linear)         Right Left    Dist cc 20/30 +1 20/60 +1    Dist ph cc NI 20/50 -2      Correction: Glasses              Tonometry (Applanation, 8:37 AM)         Right Left    Pressure 16 15              Pachymetry (6/12/2019)         Right Left    Thickness 564/-1 571/-2              Pupils         Pupils    Right PERRL    Left PERRL              Neuro/Psych       Oriented x3:  Yes                  Slit Lamp and Fundus Exam       External Exam         Right Left    External Normal Normal              Slit Lamp Exam         Right Left    Lids/Lashes Dermatochalasis, Meibomian gland dysfunction Dermatochalasis, Meibomian gland dysfunction    Conjunctiva/Sclera ocular melanosis 360 ocular melanosis 360     Cornea clear- no KP, 2+ arcus clear- no KP, 2+ arcus    Anterior Chamber Deep and quiet Deep and quiet    Iris No transillumination defects No transillumination defects    Lens + pigment on anterior capsule , 1+ Nuclear sclerosis + pigment on anterior capsule , 1+ Nuclear sclerosis, Trace Cortical cataract infeiorly              Fundus Exam         Right Left    Disc Sloping margin, Peripapillary atrophy Sloping margin, Peripapillary atrophy    C/D Ratio 0.7 0.6                  Refraction       Wearing Rx         Sphere Cylinder Axis Add    Right -10.50 +2.50 170 +2.75    Left -12.75 +1.75 030 +2.75 Type: Progressive bifocal                     ASSESSMENT/PLAN:     Diagnoses and Plan:     Primary open angle glaucoma (POAG) of both eyes, moderate stage  Visual field and OCT completed in office today with stable results that were discussed with patient in office today. Intraocular pressure stable, tolerating medications. Will continue same regimen of Latanoprost at bedtime in both eyes. Will have patient back in 4 months for a diabetic eye exam and photos. Orders Placed This Encounter      Fundus Photos - OU - Both Eyes      Meds This Visit:  Requested Prescriptions      No prescriptions requested or ordered in this encounter        Follow up instructions:  Return in about 4 months (around 11/20/2023) for Diabetic eye exam, Photos.     7/20/2023  Scribed by: Isabela Flannery MD

## 2023-07-20 NOTE — PATIENT INSTRUCTIONS
Primary open angle glaucoma (POAG) of both eyes, moderate stage  Visual field and OCT completed in office today with stable results that were discussed with patient in office today. Intraocular pressure stable, tolerating medications. Will continue same regimen of Latanoprost at bedtime in both eyes. Will have patient back in 4 months for a diabetic eye exam and photos.

## 2023-07-20 NOTE — ASSESSMENT & PLAN NOTE
Visual field and OCT completed in office today with stable results that were discussed with patient in office today. Intraocular pressure stable, tolerating medications. Will continue same regimen of Latanoprost at bedtime in both eyes. Will have patient back in 4 months for a diabetic eye exam and photos.

## 2023-07-20 NOTE — TELEPHONE ENCOUNTER
Please review refill protocol failed/ no protocol  Requested Prescriptions   Pending Prescriptions Disp Refills    TRIAMCINOLONE 0.1 % External Ointment [Pharmacy Med Name: TRIAMCINOLONE 0.1% OINTMENT] 80 g 1     Sig: APPLY TO AFFECTED AREA TWICE A DAY       There is no refill protocol information for this order

## 2023-07-21 RX ORDER — AMLODIPINE BESYLATE 10 MG/1
10 TABLET ORAL DAILY
Qty: 90 TABLET | Refills: 0 | Status: SHIPPED | OUTPATIENT
Start: 2023-07-21

## 2023-07-21 NOTE — TELEPHONE ENCOUNTER
Please review. Protocol Failed or has No Protocol. Requested Prescriptions   Pending Prescriptions Disp Refills    amLODIPine 10 MG Oral Tab 90 tablet 1     Sig: Take 1 tablet (10 mg total) by mouth daily. Hypertensive Medications Protocol Failed - 7/20/2023  7:43 AM        Failed - CMP or BMP in past 6 months     No results found for this or any previous visit (from the past 4392 hour(s)).             Passed - In person appointment in the past 12 or next 3 months     Recent Outpatient Visits              Yesterday Primary open angle glaucoma (POAG) of both eyes, moderate stage    [address], Cathy Rodriguez MD    Office Visit    2 weeks ago Right foot pain    [address] [place] [address] [address] Visit    2 months ago Lumbar spondylosis    [address], Ezra Gomez MD    Office Visit    3 months ago Type 2 diabetes mellitus with diabetic polyneuropathy, without long-term current use of insulin [facility])    [address], Nilton Wiley MD    Office Visit    3 months ago Lumbar spondylosis    [facility] Hamlet Abraham MD    Office Visit          Future Appointments         Provider Department Appt Notes    In 3 months Titus Schmidt MD [address], [address] [facility] [place] [facility] EP/ DM EE and photos               Passed - Last BP reading less than 140/90     BP Readings from Last 1 Encounters:  04/17/23 : 130/68              Passed - In person appointment or virtual visit in the past 6 months     Recent Outpatient Visits              Yesterday Primary open angle glaucoma (POAG) of both eyes, moderate stage    [address], Cathy Rodriguez MD    Office Visit    2 weeks ago Right foot pain    [address] in [address] [facility]    2 months ago Lumbar spondylosis    [facility] Jason Branch Natha Portal, MD    Office Visit    3 months ago Type 2 diabetes mellitus with diabetic polyneuropathy, without long-term current use of insulin [facility])    Palmer Bautista MD    Office Visit    3 months ago Lumbar spondylosis    [facility] Chris Conroy MD    Office Visit          Future Appointments         Provider Department Appt Notes    In 3 months MD Shagufta Ríos, [address], [place] EP/ DM EE and photos               Passed - EGFRCR or GFRAA > 50     GFR Evaluation  EGFRCR: 61 , resulted on 12/1/2022               Recent Outpatient Visits              Yesterday Primary open angle glaucoma (POAG) of both eyes, moderate stage    [address] [facility], Royal Renteria MD    Office Visit    2 weeks ago Right foot pain    [address] [address] [address] Visit    2 months ago Lumbar spondylosis    [address] [facility] Lidia [facility]    Office Visit    3 months ago Type 2 diabetes mellitus with diabetic polyneuropathy, without long-term current use of insulin [facility])    [address], Luis [place], Lore Dandy, MD    Office Visit    3 months ago Lumbar spondylosis    [facility] Conroy MD    Office Visit            Future Appointments         Provider Department Appt Notes    In 3 months MD Shagufta Ríos, [address], [place] EP/ DM EE and photos

## 2023-07-26 DIAGNOSIS — I10 ESSENTIAL HYPERTENSION: ICD-10-CM

## 2023-07-27 NOTE — TELEPHONE ENCOUNTER
Please review. Protocol failed / No protocol. Requested Prescriptions   Pending Prescriptions Disp Refills    LOSARTAN 100 MG Oral Tab [Pharmacy Med Name: LOSARTAN POTASSIUM 100 MG TAB] 90 tablet 1     Sig: TAKE 1 TABLET BY MOUTH EVERY DAY       Hypertensive Medications Protocol Failed - 2023 12:17 AM        Failed - CMP or BMP in past 6 months     No results found for this or any previous visit (from the past 4392 hour(s)).             Passed - In person appointment in the past 12 or next 3 months     Recent Outpatient Visits              1 week ago Primary open angle glaucoma (POAG) of both eyes, moderate stage    [address] [place], [place] [address]Gonsalo MD    Office Visit    3 weeks ago Right foot pain    [address] [place] [address] [address] Visit    2 months ago Lumbar spondylosis    [address] [place] [address] [facility]    Office Visit    3 months ago Type 2 diabetes mellitus with diabetic polyneuropathy, without long-term current use of insulin [facility])    Ann Frederick, Alec Koch MD    Office Visit    3 months ago Lumbar spondylosis    [facility] Autumn Fall MD    Office Visit          Future Appointments         Provider Department Appt Notes    In 3 months MD Ann Arnold, [address] [facility] EP/ DM EE and photos               Passed - Last BP reading less than 140/90     BP Readings from Last 1 Encounters:  / : 130/68              Passed - In person appointment or virtual visit in the past 6 months     Recent Outpatient Visits              1 week ago Primary open angle glaucoma (POAG) of both eyes, moderate stage    [address] Gonsalo Farfan MD    Office Visit    3 weeks ago Right foot pain    [address] [facility] [address]    2 months ago Lumbar spondylosis    Stanford Palencia, [address], Maryann Gomez MD    Office Visit    3 months ago Type 2 diabetes mellitus with diabetic polyneuropathy, without long-term current use of insulin [facility])    Stan[place] Palencia, [place] Dobbs MD    Office Visit    3 months ago Lumbar spondylosis    [facility] Willis Alves MD    Office Visit          Future Appointments         Provider Department Appt Notes    In 3 months Estell Koyanagi, MD Emmit Cristal, [address], [address] EP/ DM EE and photos               Passed - EGFRCR or GFRAA > 50     GFR Evaluation  EGFRCR: 61 , resulted on 12/1/2022              Recent Outpatient Visits              1 week ago Primary open angle glaucoma (POAG) of both eyes, moderate stage    [address] [facility]    [facility]    3 weeks ago Right foot pain    [address] [address] [address] Visit    2 months ago Lumbar spondylosis    [address] [facility], Maryann Gomez MD    Office Visit    3 months ago Type 2 diabetes mellitus with diabetic polyneuropathy, without long-term current use of insulin [facility])    [place] Palencia, [facility] [place] [facility], Afua Heck MD    Office Visit    3 months ago Lumbar spondylosis    [facility] Willis Alves MD    Office Visit           Future Appointments         Provider Department Appt Notes    In 3 months Estell Koyanagi, MD Emmit Cristal, [address], [address] EP/ DM EE and photos

## 2023-07-28 RX ORDER — LOSARTAN POTASSIUM 100 MG/1
100 TABLET ORAL DAILY
Qty: 90 TABLET | Refills: 1 | Status: SHIPPED | OUTPATIENT
Start: 2023-07-28

## 2023-09-01 ENCOUNTER — HOSPITAL ENCOUNTER (OUTPATIENT)
Dept: CT IMAGING | Facility: HOSPITAL | Age: 68
Discharge: HOME | End: 2023-09-01
Payer: COMMERCIAL

## 2023-09-01 DIAGNOSIS — S92.001A RIGHT CALCANEAL FRACTURE: ICD-10-CM

## 2023-09-01 PROCEDURE — 73700 CT LOWER EXTREMITY W/O DYE: CPT

## 2023-09-18 ENCOUNTER — TELEPHONE (OUTPATIENT)
Dept: INTERNAL MEDICINE CLINIC | Facility: CLINIC | Age: 68
End: 2023-09-18

## 2023-10-10 DIAGNOSIS — E11.42 CONTROLLED TYPE 2 DIABETES MELLITUS WITH DIABETIC POLYNEUROPATHY, WITHOUT LONG-TERM CURRENT USE OF INSULIN (HCC): ICD-10-CM

## 2023-10-11 RX ORDER — GABAPENTIN 300 MG/1
300 CAPSULE ORAL 3 TIMES DAILY PRN
Qty: 270 CAPSULE | Refills: 1 | Status: SHIPPED | OUTPATIENT
Start: 2023-10-11

## 2023-10-11 NOTE — TELEPHONE ENCOUNTER
Refill passed per [place] [employer] protocol. Requested Prescriptions   Pending Prescriptions Disp Refills    gabapentin 300 MG Oral Cap 270 capsule 1     Sig: Take 1 capsule (300 mg total) by mouth 3 (three) times daily as needed.        Neurology Medications Passed - 10/10/2023  9:39 AM        Passed - In person appointment or virtual visit in the past 6 mos or appointment in next 3 mos     Recent Outpatient Visits              2 months ago Primary open angle glaucoma (POAG) of both eyes, moderate stage    Joey Ramos MD    Office Visit    3 months ago Right foot pain    [address] [address] Visit    5 months ago Lumbar spondylosis    Jason Ramos Clelia Rape, MD    Office Visit    5 months ago Type 2 diabetes mellitus with diabetic polyneuropathy, without long-term current use of insulin [facility])    Ilene Arceo MD    Office Visit    6 months ago Lumbar spondylosis    [address] Lalito Tang MD    Office Visit          Future Appointments         Provider Department Appt Notes    In 3 weeks MD Christian Anderson, [facility] EP/ DM EE and photos                         Recent Outpatient Visits              2 months ago Primary open angle glaucoma (POAG) of both eyes, moderate stage    Joey Ramos MD    Office Visit    3 months ago Right foot pain    [address] in [address] [facility]    5 months ago Lumbar spondylosis    Jason Ramos Clelia Rape, MD    Office Visit    5 months ago Type 2 diabetes mellitus with diabetic polyneuropathy, without long-term current use of insulin ([facility]    Omid Arceo Mauricio Vega MD    Office Visit    6 months ago Lumbar spondylosis    [address] Lilian Ortega MD    Office Visit          Future Appointments         Provider Department Appt Notes    In 3 weeks Bernardo Miguel MD [address], [address], [address] EP/ DM EE and photos

## 2023-10-22 DIAGNOSIS — I10 ESSENTIAL HYPERTENSION: ICD-10-CM

## 2023-10-23 RX ORDER — AMLODIPINE BESYLATE 10 MG/1
10 TABLET ORAL DAILY
Qty: 90 TABLET | Refills: 0 | Status: SHIPPED | OUTPATIENT
Start: 2023-10-23

## 2023-10-23 NOTE — TELEPHONE ENCOUNTER
Please review. Protocol failed / Has no protocol. [employer] message sent to patient to complete labs pended from [address] [place] [address] 4/17/23. Requested Prescriptions   Pending Prescriptions Disp Refills    amLODIPine 10 MG Oral Tab 90 tablet 0     Sig: Take 1 tablet (10 mg total) by mouth daily. Hypertensive Medications Protocol Failed - 10/22/2023  7:24 AM        Failed - CMP or BMP in past 6 months     No results found for this or any previous visit (from the past 4392 hour(s)).             Failed - In person appointment or virtual visit in the past 6 months     Recent Outpatient Visits              3 months ago Primary open angle glaucoma (POAG) of both eyes, moderate stage    Anil Scott MD    Office Visit    3 months ago Right foot pain    [address] [address] [address] Visit    5 months ago Lumbar spondylosis    Jason Scott Harlow Caddy, MD    Office Visit    6 months ago Type 2 diabetes mellitus with diabetic polyneuropathy, without long-term current use of insulin [facility])    [address], Dennis Vivas MD    Office Visit    6 months ago Lumbar spondylosis    [address] Richar Murillo MD    Office Visit          Future Appointments         Provider Department Appt Notes    In 1 week Octavio Brown MD [address], [address], [facility] EP/ DM EE and photos    In 1 month Dayan Mckeon MD [address], [address] , [place] A1c control                      Passed - In person appointment in the past 12 or next 3 months     Recent Outpatient Visits              3 months ago Primary open angle glaucoma (POAG) of both eyes, moderate stage    Anil Scott MD    Office Visit    3 months ago Right foot pain    [facility] [place] [address] [address] Visit    5 months ago Lumbar spondylosis    [address], [address], Marlin Gomez MD    Office Visit    6 months ago Type 2 diabetes mellitus with diabetic polyneuropathy, without long-term current use of insulin [facility])    [address], Loepz Lopez MD    Office Visit    6 months ago Lumbar spondylosis    [address] Virgil Bocanegra MD    Office Visit          Future Appointments         Provider Department Appt Notes    In 1 week Jeanette Velásquez MD [address], [address], [place] EP/ DM EE and photos    In 1 month Anna Collado MD [address], [address] [facility] A1c control                      Passed - Last BP reading less than 140/90     BP Readings from Last 1 Encounters:  04/17/23 : 130/68              Passed - EGFRCR or GFRAA > 50     GFR Evaluation  EGFRCR: 61 , resulted on 12/1/2022             Future Appointments         Provider Department Appt Notes    In 1 week Jeanette Velásquez MD [address], [address], [place] EP/ DM EE and photos    In 1 month Anna Collado MD [address], [address] [place] A1c control           Recent Outpatient Visits              3 months ago Primary open angle glaucoma (POAG) of both eyes, moderate stage    Semaj Ding MD    Office Visit    3 months ago Right foot pain    [address] [address] [address] Visit    5 months ago Lumbar spondylosis    Jason Ding Gearldine Fennel, MD    Office Visit    6 months ago Type 2 diabetes mellitus with diabetic polyneuropathy, without long-term current use of insulin [facility])    [address], [address] [place], Kehinde Peres MD    Office Visit    6 months ago Lumbar spondylosis    [address] Blanca Kaufman MD    Office Visit

## 2023-11-02 ENCOUNTER — OFFICE VISIT (OUTPATIENT)
Dept: OPHTHALMOLOGY | Facility: CLINIC | Age: 68
End: 2023-11-02
Payer: COMMERCIAL

## 2023-11-02 DIAGNOSIS — H40.1132 PRIMARY OPEN ANGLE GLAUCOMA (POAG) OF BOTH EYES, MODERATE STAGE: Primary | ICD-10-CM

## 2023-11-02 DIAGNOSIS — H25.13 AGE-RELATED NUCLEAR CATARACT OF BOTH EYES: ICD-10-CM

## 2023-11-02 DIAGNOSIS — H53.002 AMBLYOPIA, LEFT: ICD-10-CM

## 2023-11-02 DIAGNOSIS — H43.393 FLOATER, VITREOUS, BILATERAL: ICD-10-CM

## 2023-11-02 DIAGNOSIS — E11.9 DIABETES MELLITUS TYPE 2 WITHOUT RETINOPATHY (HCC): ICD-10-CM

## 2023-11-02 PROCEDURE — 92014 COMPRE OPH EXAM EST PT 1/>: CPT | Performed by: OPHTHALMOLOGY

## 2023-11-02 PROCEDURE — 92015 DETERMINE REFRACTIVE STATE: CPT | Performed by: OPHTHALMOLOGY

## 2023-11-02 PROCEDURE — 92250 FUNDUS PHOTOGRAPHY W/I&R: CPT | Performed by: OPHTHALMOLOGY

## 2023-11-02 PROCEDURE — 3072F LOW RISK FOR RETINOPATHY: CPT | Performed by: OPHTHALMOLOGY

## 2023-11-02 PROCEDURE — 2023F DILAT RTA XM W/O RTNOPTHY: CPT | Performed by: OPHTHALMOLOGY

## 2023-11-02 NOTE — ASSESSMENT & PLAN NOTE
Intraocular pressure stable, tolerating medications. Photos were taken today to document optic nerves. Will continue same regimen of Latanoprost at bedtime in both eyes. Will have patient back in 4 months for a pressure check. Candelaria Méndez

## 2023-11-02 NOTE — PATIENT INSTRUCTIONS
Primary open angle glaucoma (POAG) of both eyes, moderate stage  Intraocular pressure stable, tolerating medications. Photos were taken today to document optic nerves. Will continue same regimen of Latanoprost at bedtime in both eyes. Will have patient back in 4 months for a pressure check. .       Diabetes mellitus type 2 without retinopathy ([facility] [address]  Diabetes type II: no background of retinopathy, no signs of neovascularization noted. Discussed ocular and systemic benefits of blood sugar control. Diagnosis and treatment discussed in detail with patient. Amblyopia, left  Longstanding; no treatment. Age-related nuclear cataract of both eyes  Discussed mild cataracts in both eyes that are not affecting vision and are not surgical at this time. Floater, vitreous, bilateral  No treatment. High myopia, bilateral  New glasses today; update as needed. Discussed that new prescription is only a slight change.

## 2023-11-02 NOTE — PROGRESS NOTES
Flores Magallanes is a 70year old male. HPI:     HPI    Pt in today for a diabetic eye exam and photos. Per pt is taking Latanoprost in both eyes at bedtime as directed. Pt states vision is a little blurry, mainly in the left eye and feels he may need an updated glasses Rx. Pt has been a diabetic for 10 years       Pt's diabetes is currently controlled by pills   Pt checks BS daily    Pt's last blood sugar was 209 yesterday  Last HA1C was 7.2 on 4/17/23  Endocrinologist: none     Last edited by Linda Saldana OT on 11/2/2023  8:45 AM.        Patient History:  Past Medical History:   Diagnosis Date    Age-related nuclear cataract of both eyes 06/30/2015    Amblyopia-left eye 06/30/2015    Anemia     Appendicitis     Diabetes type 2, controlled ([facility] [address] 06/30/2015    Floater, vitreous 06/30/2015    Glaucoma     High blood pressure     High myopia 06/30/2015    Knee injury 2012    Obesity, unspecified     Other and unspecified hyperlipidemia     Primary open angle glaucoma (POAG) of both eyes, moderate stage 06/12/2019    Start Latanoprost qhs OU    Type II or unspecified type diabetes mellitus without mention of complication, not stated as uncontrolled     Unspecified essential hypertension        Surgical History: Flores Magallanes has a past surgical history that includes electrocardiogram, complete (11/28/2012) (scanned to media tab); appendectomy (2012); knee surgery; colonoscopy (2003); colonoscopy (12/18/2013); colonoscopy (N/A, 07/10/2020) (Procedure: COLONOSCOPY;  Surgeon: Randall Weaver MD;  Location: [address] [place] [address]); arthroscopy of joint unlisted; tonsillectomy; and hip replacement surgery (Left, 07/09/2021).     Family History   Problem Relation Age of Onset    Heart Disease Mother     Diabetes Mother     Cancer Sister         ovarian    Ovarian Cancer Sister     Heart Disease Brother         x2    Cancer Brother         bladder cancer    Other (Other) Father         killed in [address] [facility] [address] accident Breast Cancer Cousin     Lipids Neg         hyperlipidemia    Glaucoma Neg     Macular degeneration Neg        Social History:   Social History     Socioeconomic History    Marital status:    Tobacco Use    Smoking status: Former     Packs/day: 0.00     Years: 45.00     Additional pack years: 0.00     Total pack years: 0.00     Types: Cigarettes     Start date: 5/3/2019     Quit date: 2019     Years since quittin.5    Smokeless tobacco: Never   Vaping Use    Vaping Use: Never used   Substance and Sexual Activity    Alcohol use: Not Currently    Drug use: No    Sexual activity: Yes   Other Topics Concern    Caffeine Concern No     Comment: coffee 2 cups    Exercise No     Comment: yardwork   Social History Narrative    The patient does not use an assistive device. .      The patient does live in a home with stairs. Medications:  Current Outpatient Medications   Medication Sig Dispense Refill    LATANOPROST 0.005 % Ophthalmic Solution USE 1 DROP IN BOTH EYES NIGHTLY 7.5 mL 3    amLODIPine 10 MG Oral Tab Take 1 tablet (10 mg total) by mouth daily. 90 tablet 0    gabapentin 300 MG Oral Cap Take 1 capsule (300 mg total) by mouth 3 (three) times daily as needed. 270 capsule 1    losartan 100 MG Oral Tab Take 1 tablet (100 mg total) by mouth daily. 90 tablet 1    triamcinolone 0.1 % External Ointment Apply 1 Application topically 2 (two) times daily. APPLY TO AFFECTED AREA 80 g 1    levocetirizine 5 MG Oral Tab Take 1 tablet (5 mg total) by mouth daily. 90 tablet 3    omeprazole 20 MG Oral Capsule Delayed Release TAKE 1 CAPSULE BY MOUTH EVERY DAY IN THE MORNING 90 capsule 3    metoprolol succinate  MG Oral Tablet 24 Hr Take 1 tablet (100 mg total) by mouth every evening. 90 tablet 1    atorvastatin 20 MG Oral Tab Take 1 tablet (20 mg total) by mouth daily. 90 tablet 3    dapagliflozin (FARXIGA) 10 MG Oral Tab Take 1 tablet (10 mg total) by mouth daily.  90 tablet 1    CYCLOBENZAPRINE 10 MG Oral Tab TAKE 1 TABLET BY MOUTH EVERY DAY AT NIGHT 30 tablet 0    metFORMIN  MG Oral Tablet 24 Hr Take 4 tablets (2,000 mg total) by mouth daily with breakfast. 360 tablet 3    Glucose Blood (ACCU-CHEK GUIDE) In Vitro Strip To monitor blood sugar three times daily 300 strip 3    tacrolimus 0.1 % External Ointment Apply 1 Application topically 2 (two) times daily. APPLY TO AFFECTED AREA 60 g 3    clobetasol 0.05 % External Ointment Apply 1 Application topically daily as needed. Apply to both arms and foot 2x/day as needed 120 g 1    mupirocin 2 % External Ointment Apply 1 Application topically 3 (three) times daily. 30 g 0    Accu-Chek FastClix Lancets Does not apply Misc To monitor blood sugar three times daily 300 each 3    aspirin 81 MG Oral Tab EC Take 1 tablet (81 mg total) by mouth daily. Allergies:    Cymbalta [Duloxetin*    RASH    ROS:       PHYSICAL EXAM:     Base Eye Exam       Visual Acuity (Snellen - Linear)         Right Left    Dist cc 20/50 +2 20/50 -2    Dist ph cc 20/30 -2 20/40    Near cc 20/40- 20/50-      Correction: Glasses              Tonometry (Applanation, 8:55 AM)         Right Left    Pressure 15 15              Pachymetry (6/12/2019)         Right Left    Thickness 564/-1 571/-2              Pupils         Pupils    Right PERRL    Left PERRL              Visual Fields         Left Right     Full Full              Extraocular Movement         Right Left     Full, Ortho Full, Ortho              Neuro/Psych       Oriented x3:  Yes              Dilation       Both eyes: 1.0% Mydriacyl and 2.5% Roberto Synephrine @ 8:55 AM              Dilation #2       Both eyes: 1.0% Mydriacyl and 2.5% Roberto Synephrine @ 8:58 AM                  Slit Lamp and Fundus Exam       External Exam         Right Left    External Normal Normal              Slit Lamp Exam         Right Left    Lids/Lashes Dermatochalasis, Meibomian gland dysfunction Dermatochalasis, Meibomian gland dysfunction    Conjunctiva/Sclera ocular melanosis 360 ocular melanosis 360    Cornea clear- no KP, 2+ arcus clear- no KP, 2+ arcus    Anterior Chamber Deep and quiet Deep and quiet    Iris No transillumination defects No transillumination defects    Lens + pigment on anterior capsule, 1+ Nuclear sclerosis + pigment on anterior capsule, 1+ Nuclear sclerosis, Trace Cortical cataract infeiorly    Vitreous Vitreous floaters Vitreous floaters              Fundus Exam         Right Left    Disc Sloping margin, Peripapillary atrophy Sloping margin, Peripapillary atrophy    C/D Ratio 0.7 0.6    Macula Normal- no BDR Normal- no BDR    Vessels Normal Normal    Periphery Normal Normal                  Refraction       Wearing Rx         Sphere Cylinder Axis Add    Right -10.50 +2.50 170 +2.75    Left -12.75 +1.75 030 +2.75      Type: Progressive bifocal              Manifest Refraction         Sphere Cylinder [place] Dist [place] Add [place]    Right -10.00 +2.75 170 20/30- +3.00 20/20-    Left -12.50 +2.00 030 20/40 +3.00 20/30-              Final Rx         Sphere Cylinder Look[place] Dist VA Add Near VA    Right -10.00 +2.75 170 20/30- +3.00 20/20-    Left -12.50 +2.00 030 20/40 +3.00 20/30-      Type: Progressive bifocal                     ASSESSMENT/PLAN:     Diagnoses and Plan:     Primary open angle glaucoma (POAG) of both eyes, moderate stage  Intraocular pressure stable, tolerating medications. Photos were taken today to document optic nerves. Will continue same regimen of Latanoprost at bedtime in both eyes. Will have patient back in 4 months for a pressure check. .       Diabetes mellitus type 2 without retinopathy ([facility] [address]  Diabetes type II: no background of retinopathy, no signs of neovascularization noted. Discussed ocular and systemic benefits of blood sugar control. Diagnosis and treatment discussed in detail with patient. Amblyopia, left  Longstanding; no treatment.      Age-related nuclear cataract of both eyes  Discussed mild cataracts in both eyes that are not affecting vision and are not surgical at this time. Floater, vitreous, bilateral  No treatment. High myopia, bilateral  New glasses today; update as needed. Discussed that new prescription is only a slight change. No orders of the defined types were placed in this encounter. Meds This Visit:  Requested Prescriptions      No prescriptions requested or ordered in this encounter        Follow up instructions:  Return in about 4 months (around 3/2/2024) for IOP check.     11/2/2023  Scribed by: Tremaine Delvalle MD

## 2023-11-21 ENCOUNTER — LAB ENCOUNTER (OUTPATIENT)
Dept: LAB | Age: 68
End: 2023-11-21
Attending: INTERNAL MEDICINE
Payer: COMMERCIAL

## 2023-11-21 DIAGNOSIS — E11.42 TYPE 2 DIABETES MELLITUS WITH DIABETIC POLYNEUROPATHY, WITHOUT LONG-TERM CURRENT USE OF INSULIN (HCC): ICD-10-CM

## 2023-11-21 LAB
ALBUMIN SERPL-MCNC: 4.6 G/DL (ref 3.2–4.8)
ALBUMIN/GLOB SERPL: 1.6 {RATIO} (ref 1–2)
ALP LIVER SERPL-CCNC: 69 U/L
ALT SERPL-CCNC: 18 U/L
ANION GAP SERPL CALC-SCNC: 5 MMOL/L (ref 0–18)
AST SERPL-CCNC: 20 U/L (ref ?–34)
BASOPHILS # BLD AUTO: 0.03 X10(3) UL (ref 0–0.2)
BASOPHILS NFR BLD AUTO: 0.4 %
BILIRUB SERPL-MCNC: 0.4 MG/DL (ref 0.2–1.1)
BUN BLD-MCNC: 6 MG/DL (ref 9–23)
BUN/CREAT SERPL: 5.6 (ref 10–20)
CALCIUM BLD-MCNC: 9.2 MG/DL (ref 8.7–10.4)
CHLORIDE SERPL-SCNC: 105 MMOL/L (ref 98–112)
CHOLEST SERPL-MCNC: 109 MG/DL (ref ?–200)
CO2 SERPL-SCNC: 27 MMOL/L (ref 21–32)
CREAT BLD-MCNC: 1.07 MG/DL
CREAT UR-SCNC: 191.3 MG/DL
DEPRECATED RDW RBC AUTO: 44.3 FL (ref 35.1–46.3)
EGFRCR SERPLBLD CKD-EPI 2021: 75 ML/MIN/1.73M2 (ref 60–?)
EOSINOPHIL # BLD AUTO: 0.2 X10(3) UL (ref 0–0.7)
EOSINOPHIL NFR BLD AUTO: 3 %
ERYTHROCYTE [DISTWIDTH] IN BLOOD BY AUTOMATED COUNT: 13.2 % (ref 11–15)
EST. AVERAGE GLUCOSE BLD GHB EST-MCNC: 283 MG/DL (ref 68–126)
FASTING PATIENT LIPID ANSWER: YES
FASTING STATUS PATIENT QL REPORTED: YES
GLOBULIN PLAS-MCNC: 2.8 G/DL (ref 2.8–4.4)
GLUCOSE BLD-MCNC: 174 MG/DL (ref 70–99)
HBA1C MFR BLD: 11.5 % (ref ?–5.7)
HCT VFR BLD AUTO: 40.4 %
HDLC SERPL-MCNC: 37 MG/DL (ref 40–59)
HGB BLD-MCNC: 13.6 G/DL
IMM GRANULOCYTES # BLD AUTO: 0.02 X10(3) UL (ref 0–1)
IMM GRANULOCYTES NFR BLD: 0.3 %
LDLC SERPL CALC-MCNC: 56 MG/DL (ref ?–100)
LYMPHOCYTES # BLD AUTO: 1.97 X10(3) UL (ref 1–4)
LYMPHOCYTES NFR BLD AUTO: 29.5 %
MCH RBC QN AUTO: 30.7 PG (ref 26–34)
MCHC RBC AUTO-ENTMCNC: 33.7 G/DL (ref 31–37)
MCV RBC AUTO: 91.2 FL
MICROALBUMIN UR-MCNC: 3.1 MG/DL
MICROALBUMIN/CREAT 24H UR-RTO: 16.2 UG/MG (ref ?–30)
MONOCYTES # BLD AUTO: 0.52 X10(3) UL (ref 0.1–1)
MONOCYTES NFR BLD AUTO: 7.8 %
NEUTROPHILS # BLD AUTO: 3.93 X10 (3) UL (ref 1.5–7.7)
NEUTROPHILS # BLD AUTO: 3.93 X10(3) UL (ref 1.5–7.7)
NEUTROPHILS NFR BLD AUTO: 59 %
NONHDLC SERPL-MCNC: 72 MG/DL (ref ?–130)
OSMOLALITY SERPL CALC.SUM OF ELEC: 286 MOSM/KG (ref 275–295)
PLATELET # BLD AUTO: 301 10(3)UL (ref 150–450)
POTASSIUM SERPL-SCNC: 4.3 MMOL/L (ref 3.5–5.1)
PROT SERPL-MCNC: 7.4 G/DL (ref 5.7–8.2)
RBC # BLD AUTO: 4.43 X10(6)UL
SODIUM SERPL-SCNC: 137 MMOL/L (ref 136–145)
TRIGL SERPL-MCNC: 77 MG/DL (ref 30–149)
VIT B12 SERPL-MCNC: 464 PG/ML (ref 211–911)
VLDLC SERPL CALC-MCNC: 11 MG/DL (ref 0–30)
WBC # BLD AUTO: 6.7 X10(3) UL (ref 4–11)

## 2023-11-21 PROCEDURE — 80061 LIPID PANEL: CPT

## 2023-11-21 PROCEDURE — 82043 UR ALBUMIN QUANTITATIVE: CPT

## 2023-11-21 PROCEDURE — 36415 COLL VENOUS BLD VENIPUNCTURE: CPT

## 2023-11-21 PROCEDURE — 83036 HEMOGLOBIN GLYCOSYLATED A1C: CPT

## 2023-11-21 PROCEDURE — 3046F HEMOGLOBIN A1C LEVEL >9.0%: CPT | Performed by: INTERNAL MEDICINE

## 2023-11-21 PROCEDURE — 80053 COMPREHEN METABOLIC PANEL: CPT

## 2023-11-21 PROCEDURE — 82570 ASSAY OF URINE CREATININE: CPT

## 2023-11-21 PROCEDURE — 82607 VITAMIN B-12: CPT

## 2023-11-21 PROCEDURE — 85025 COMPLETE CBC W/AUTO DIFF WBC: CPT

## 2023-11-21 PROCEDURE — 3061F NEG MICROALBUMINURIA REV: CPT | Performed by: INTERNAL MEDICINE

## 2023-12-05 ENCOUNTER — OFFICE VISIT (OUTPATIENT)
Facility: CLINIC | Age: 68
End: 2023-12-05
Payer: COMMERCIAL

## 2023-12-05 VITALS
WEIGHT: 237 LBS | OXYGEN SATURATION: 95 % | BODY MASS INDEX: 38.09 KG/M2 | SYSTOLIC BLOOD PRESSURE: 118 MMHG | RESPIRATION RATE: 18 BRPM | DIASTOLIC BLOOD PRESSURE: 52 MMHG | HEART RATE: 61 BPM | HEIGHT: 66 IN

## 2023-12-05 DIAGNOSIS — E11.42 TYPE 2 DIABETES MELLITUS WITH DIABETIC POLYNEUROPATHY, WITHOUT LONG-TERM CURRENT USE OF INSULIN (HCC): ICD-10-CM

## 2023-12-05 DIAGNOSIS — I10 ESSENTIAL HYPERTENSION: Primary | ICD-10-CM

## 2023-12-05 PROCEDURE — 3078F DIAST BP <80 MM HG: CPT | Performed by: INTERNAL MEDICINE

## 2023-12-05 PROCEDURE — 90662 IIV NO PRSV INCREASED AG IM: CPT | Performed by: INTERNAL MEDICINE

## 2023-12-05 PROCEDURE — 3074F SYST BP LT 130 MM HG: CPT | Performed by: INTERNAL MEDICINE

## 2023-12-05 PROCEDURE — 90472 IMMUNIZATION ADMIN EACH ADD: CPT | Performed by: INTERNAL MEDICINE

## 2023-12-05 PROCEDURE — 99214 OFFICE O/P EST MOD 30 MIN: CPT | Performed by: INTERNAL MEDICINE

## 2023-12-05 PROCEDURE — 90677 PCV20 VACCINE IM: CPT | Performed by: INTERNAL MEDICINE

## 2023-12-05 PROCEDURE — 3008F BODY MASS INDEX DOCD: CPT | Performed by: INTERNAL MEDICINE

## 2023-12-05 PROCEDURE — 90471 IMMUNIZATION ADMIN: CPT | Performed by: INTERNAL MEDICINE

## 2023-12-05 RX ORDER — AMLODIPINE BESYLATE 10 MG/1
10 TABLET ORAL DAILY
Qty: 90 TABLET | Refills: 3 | Status: SHIPPED | OUTPATIENT
Start: 2023-12-05

## 2023-12-05 RX ORDER — HYDROCHLOROTHIAZIDE 12.5 MG/1
12.5 TABLET ORAL DAILY
COMMUNITY
Start: 2023-09-12

## 2023-12-05 NOTE — ASSESSMENT & PLAN NOTE
Patient's A1c was 7.2 in April and now it is 11.5. Patient has not taken [place] since April due to the fact that he never received it from the pharmacy. Advised patient that he should always call with any questions about medications and he should not simply stop taking them. Continue metformin at current dose. Foot exam today was normal.  Follow-up in 2 to 3 months with new PCP since I am retiring.

## 2023-12-05 NOTE — PATIENT INSTRUCTIONS
My last day will  be January 11, 2024. I recommend the following providers:    [facility] [employer] (Internal Medicine) [address] Dr. Therisa Brunet office. [phone]  Dr. Johnie Wharton or Lizabeth Resendiz [phone]  [address] [place] [address]        [email or web address]

## 2023-12-28 NOTE — TELEPHONE ENCOUNTER
Please review; protocol failed/ has no protocol    Requested Prescriptions   Pending Prescriptions Disp Refills    clobetasol 0.05 % External Ointment 120 g 1     Sig: Apply 1 Application  topically daily as needed.  Apply to both arms and foot 2x/day as needed       There is no refill protocol information for this order        Recent Outpatient Visits              3 weeks ago Essential hypertension    [address], [place] [address] Jesse Watters MD    Office Visit    1 month ago Primary open angle glaucoma (POAG) of both eyes, moderate stage    [facility], [address], [place]    Office Visit    5 months ago Primary open angle glaucoma (POAG) of both eyes, moderate stage    [facility], [address], [place], Minnie Corral MD    Office Visit    5 months ago Right foot pain    [address] [place] [address] [address] Visit    7 months ago Lumbar spondylosis    [place] Ding MD    Office Visit          Future Appointments         Provider Department Appt Notes    In 1 week Bety Toribio MD [address], [address] [facility],[address], [place] Possible NP w/Dr Karsten Gonzalez, wants to meet him, informed pt of late policy/KB    In 2 months Jeanette Velásquez MD [address], [address] [facility] EP/ IOP check
English

## 2023-12-28 NOTE — TELEPHONE ENCOUNTER
Please review; protocol failed/ No protocol     Medication pended for your review / approval      Requested Prescriptions   Pending Prescriptions Disp Refills    triamcinolone 0.1 % External Ointment 80 g 1     Sig: Apply 1 Application topically 2 (two) times daily.  APPLY TO AFFECTED AREA       There is no refill protocol information for this order        Future Appointments         Provider Department Appt Notes    In 1 week Edgardo Cardona MD [address], [address], [place] Possible NP w/Dr Meg Eddy, wants to meet him, informed pt of late policy/KB    In 2 months Bay Otero MD [address], [address] [facility] EP/ IOP check          Recent Outpatient Visits              3 weeks ago Essential hypertension    [address], Antonia Wagner MD    Office Visit    1 month ago Primary open angle glaucoma (POAG) of both eyes, moderate stage    [address], [address], [place], Liz Rolle MD    Office Visit    5 months ago Primary open angle glaucoma (POAG) of both eyes, moderate stage    Luis Eduardo Mayberry MD    Office Visit    5 months ago Right foot pain    [address] in [facility]    7 months ago Lumbar spondylosis    Jason Mayberry Laurent Sicks, MD    Office Visit

## 2023-12-29 RX ORDER — TRIAMCINOLONE ACETONIDE 1 MG/G
1 OINTMENT TOPICAL 2 TIMES DAILY
Qty: 80 G | Refills: 1 | Status: SHIPPED | OUTPATIENT
Start: 2023-12-29

## 2023-12-29 RX ORDER — CLOBETASOL PROPIONATE 0.5 MG/G
1 OINTMENT TOPICAL DAILY PRN
Qty: 120 G | Refills: 1 | Status: SHIPPED | OUTPATIENT
Start: 2023-12-29

## 2024-01-04 ENCOUNTER — OFFICE VISIT (OUTPATIENT)
Dept: INTERNAL MEDICINE CLINIC | Facility: CLINIC | Age: 69
End: 2024-01-04
Payer: COMMERCIAL

## 2024-01-04 VITALS
WEIGHT: 226 LBS | SYSTOLIC BLOOD PRESSURE: 126 MMHG | HEIGHT: 66 IN | BODY MASS INDEX: 36.32 KG/M2 | TEMPERATURE: 98 F | OXYGEN SATURATION: 97 % | DIASTOLIC BLOOD PRESSURE: 78 MMHG | HEART RATE: 74 BPM

## 2024-01-04 DIAGNOSIS — H52.13 HIGH MYOPIA, BILATERAL: ICD-10-CM

## 2024-01-04 DIAGNOSIS — H25.13 AGE-RELATED NUCLEAR CATARACT OF BOTH EYES: ICD-10-CM

## 2024-01-04 DIAGNOSIS — I10 ESSENTIAL HYPERTENSION: Primary | ICD-10-CM

## 2024-01-04 DIAGNOSIS — M47.816 LUMBAR SPONDYLOSIS: ICD-10-CM

## 2024-01-04 DIAGNOSIS — E11.9 DIABETES MELLITUS TYPE 2 WITHOUT RETINOPATHY (HCC): ICD-10-CM

## 2024-01-04 DIAGNOSIS — K21.9 GASTROESOPHAGEAL REFLUX DISEASE, UNSPECIFIED WHETHER ESOPHAGITIS PRESENT: ICD-10-CM

## 2024-01-04 DIAGNOSIS — R93.1 AGATSTON CORONARY ARTERY CALCIUM SCORE BETWEEN 200 AND 399: ICD-10-CM

## 2024-01-04 DIAGNOSIS — L43.9 LICHEN PLANUS: ICD-10-CM

## 2024-01-04 DIAGNOSIS — Z87.891 HISTORY OF NICOTINE DEPENDENCE: ICD-10-CM

## 2024-01-04 DIAGNOSIS — E78.00 HYPERCHOLESTEROLEMIA: ICD-10-CM

## 2024-01-04 DIAGNOSIS — I25.10 ATHEROSCLEROSIS OF NATIVE CORONARY ARTERY OF NATIVE HEART WITHOUT ANGINA PECTORIS: ICD-10-CM

## 2024-01-04 DIAGNOSIS — E11.65 TYPE 2 DIABETES MELLITUS WITH HYPERGLYCEMIA, WITHOUT LONG-TERM CURRENT USE OF INSULIN (HCC): ICD-10-CM

## 2024-01-04 DIAGNOSIS — H40.1132 PRIMARY OPEN ANGLE GLAUCOMA (POAG) OF BOTH EYES, MODERATE STAGE: ICD-10-CM

## 2024-01-04 PROBLEM — Z63.6 CAREGIVER STRESS: Status: RESOLVED | Noted: 2019-11-14 | Resolved: 2024-01-04

## 2024-01-04 PROBLEM — B35.3 TINEA PEDIS OF LEFT FOOT: Status: RESOLVED | Noted: 2018-08-20 | Resolved: 2024-01-04

## 2024-01-04 PROBLEM — M16.12 PRIMARY OSTEOARTHRITIS OF LEFT HIP: Status: RESOLVED | Noted: 2021-01-30 | Resolved: 2024-01-04

## 2024-01-04 PROBLEM — M51.369 BULGING LUMBAR DISC: Status: ACTIVE | Noted: 2024-01-04

## 2024-01-04 PROBLEM — M51.36 BULGING LUMBAR DISC: Status: ACTIVE | Noted: 2024-01-04

## 2024-01-04 PROBLEM — S92.009A CALCANEAL FRACTURE: Status: ACTIVE | Noted: 2024-01-04

## 2024-01-04 PROBLEM — G57.12 MERALGIA PARESTHETICA OF LEFT SIDE: Status: RESOLVED | Noted: 2020-03-03 | Resolved: 2024-01-04

## 2024-01-04 PROBLEM — G89.29 CHRONIC LOW BACK PAIN: Status: RESOLVED | Noted: 2021-06-15 | Resolved: 2024-01-04

## 2024-01-04 PROBLEM — M54.50 CHRONIC LOW BACK PAIN: Status: RESOLVED | Noted: 2021-06-15 | Resolved: 2024-01-04

## 2024-01-04 PROCEDURE — 99204 OFFICE O/P NEW MOD 45 MIN: CPT | Performed by: FAMILY MEDICINE

## 2024-01-04 PROCEDURE — 3008F BODY MASS INDEX DOCD: CPT | Performed by: FAMILY MEDICINE

## 2024-01-04 PROCEDURE — 3074F SYST BP LT 130 MM HG: CPT | Performed by: FAMILY MEDICINE

## 2024-01-04 PROCEDURE — 3078F DIAST BP <80 MM HG: CPT | Performed by: FAMILY MEDICINE

## 2024-01-04 RX ORDER — LOSARTAN POTASSIUM AND HYDROCHLOROTHIAZIDE 12.5; 1 MG/1; MG/1
1 TABLET ORAL DAILY
Qty: 90 TABLET | Refills: 3 | Status: SHIPPED | OUTPATIENT
Start: 2024-01-04

## 2024-01-04 NOTE — PROGRESS NOTES
FAMILY MEDICINE CLINIC NOTE    HPI  Cristobal Floyd is a 68 year old male presenting for     #DM  -Hba1c: 11.5 11/21/23  -Microalbuminuria:11/2023  -B12: future scree  -Pneumonia vaccine:Prevnar 20 12/2023  -HepB:Consider future screen  -Eye exam: Dr Contreras   -Diabetic foot exam: 1/4/23 Bilateral barefoot skin diabetic exam is normal, visualized feet and the appearance is normal. Bilateral monofilament/sensation of both feet is normal. Pulsation pedal pulse exam of both lower legs/feet is normal as well.  -Current medications: dapagliflozin 10 mg daily (was off and recently restarted), metformin ER 1000mg twice daily   -Blood sugars:  -AM:      -Noon:   -PM:      -hypoglycemia:    #Calcaneal fracture   -Xray foot 7/2023 - 10-mm cortical deformity along the plantar aspect the anterior-lateral calcaneal body.  The appearance suggests cortical fracture/avulsion.  5 mm linear structure, most likely representing cortical bone is within the soft tissues at the site of cortical deformity, with differential including form body.  Correlate with mechanism of injury.   -ortho Dr Apodaca  -now has plans to see podiatry Dr Ludwig Wasserman     #Lumbar spondylosis  #Lumbar bulging disc   #Neuropathy  -MRI 4/2017 - CONCLUSION: Disease and osteoarthritis within the lumbar spine as above, causing central canal and neuroforaminal narrowing.  Central canal narrowing is most advanced at L3-4.  Neuroforaminal narrowing is most advanced at L4-5.   -history of sciatica   -has had facet injection  -historically radiating from lower back down to left leg   -gabapentin 300 mg three times daily  -controlled   -overall back pain is well controlled without significant issues at this time   -physiatry Dr Hernandez Sanabria      #CAD  #HLD  #HTN  -cardiac cath 2012, being managed medically  -no CP, no SOB  -aspirin 81 mg daily   -losartan 100 mg daily ---will combine  -hydrochlorothiazide 12.5 mg daily   -amlodipine 10 mg daily   -metoprolol  succinate 100 mg daily   -atorvastatin 20 mg nightly     #GERD  -omeprazole 20 mg daily  -no issues     #Glaucoma   #Cataracts  #Myopia   -ophthalmology Dr Contreras   -lataonoprost solution    #Lichen planus  -tacrolimus9,1% ointment   -clobetasol 0.0.5% ointment  -triamcinolone 0.1%  -dermatology Clive Shannon     #Smoking history  -quit in 2019  -needs repeat CT lung screen in 3/2024    ROS  GENERAL: No fever/chills, no recent weight loss  HEENT: No visual changes, no changes in hearing, no sore throats  NECK: No pain, no swelling  RESP: No cough, no SOB  CV: No chest pain, no palpitations  GI: No abd pain, no N/V/D  MSK: No edema  SKIN: No new rashes  NEURO: No numbness, no tingling, no headaches    HEALTH MAINTENANCE  Health Maintenance Topics with due status: Overdue       Topic Date Due    Annual Physical 11/14/2023    Annual Depression Screening 01/01/2024    Fall Risk Screening (Annual) 01/01/2024    Diabetes Care Foot Exam (Annual) 01/01/2024       ALLERGIES  Allergies   Allergen Reactions    Cymbalta [Duloxetine Hcl] RASH       MEDICATIONS  Current Outpatient Medications   Medication Sig Dispense Refill    Losartan Potassium-HCTZ 100-12.5 MG Oral Tab Take 1 tablet by mouth daily. 90 tablet 3    clobetasol 0.05 % External Ointment Apply 1 Application  topically daily as needed. Apply to both arms and foot 2x/day as needed 120 g 1    triamcinolone 0.1 % External Ointment Apply 1 Application topically 2 (two) times daily. APPLY TO AFFECTED AREA 80 g 1    dapagliflozin (FARXIGA) 10 MG Oral Tab Take 1 tablet (10 mg total) by mouth daily. 90 tablet 1    amLODIPine 10 MG Oral Tab Take 1 tablet (10 mg total) by mouth daily. 90 tablet 3    gabapentin 300 MG Oral Cap Take 1 capsule (300 mg total) by mouth 3 (three) times daily as needed. 270 capsule 1    omeprazole 20 MG Oral Capsule Delayed Release TAKE 1 CAPSULE BY MOUTH EVERY DAY IN THE MORNING 90 capsule 3    metoprolol succinate  MG Oral Tablet 24 Hr Take  1 tablet (100 mg total) by mouth every evening. 90 tablet 1    LATANOPROST 0.005 % Ophthalmic Solution USE 1 DROP IN BOTH EYES NIGHTLY 7.5 mL 3    atorvastatin 20 MG Oral Tab Take 1 tablet (20 mg total) by mouth daily. 90 tablet 3    metFORMIN  MG Oral Tablet 24 Hr Take 4 tablets (2,000 mg total) by mouth daily with breakfast. 360 tablet 3    Glucose Blood (ACCU-CHEK GUIDE) In Vitro Strip To monitor blood sugar three times daily 300 strip 3    Accu-Chek FastClix Lancets Does not apply Misc To monitor blood sugar three times daily 300 each 3    aspirin 81 MG Oral Tab EC Take 1 tablet (81 mg total) by mouth daily.         ACTIVE PROBLEMS  Patient Active Problem List   Diagnosis    Atherosclerosis of native coronary artery of native heart without angina pectoris    GERD (gastroesophageal reflux disease)    Essential hypertension    Hypercholesterolemia    History of nicotine dependence    Age-related nuclear cataract of both eyes    High myopia, bilateral    Amblyopia, left    Type 2 diabetes mellitus with hyperglycemia, without long-term current use of insulin ([facility])    Diabetes mellitus type 2 without retinopathy ([facility])    Floater, vitreous, bilateral    Lichen planus    Primary open angle glaucoma (POAG) of both eyes, moderate stage    Agatston coronary artery calcium score between 200 and 399    Lumbar spondylosis    Morbid (severe) obesity due to excess calories ([facility])    Calcaneal fracture    Bulging lumbar disc       PAST MEDICAL HISTORY  Past Medical History:   Diagnosis Date    Age-related nuclear cataract of both eyes 06/30/2015    Amblyopia-left eye 06/30/2015    Bulging lumbar disc 01/04/2024    Essential hypertension 05/29/2013    Floater, vitreous 06/30/2015    GERD (gastroesophageal reflux disease) 11/13/2013    Glaucoma     High myopia 06/30/2015    Hypercholesterolemia 01/21/2013    Knee injury 2012    Lichen planus 08/15/2019    Meralgia paresthetica of left side 03/03/2020    Primary open angle  glaucoma (POAG) of both eyes, moderate stage 2019    Start Latanoprost qhs OU    Primary osteoarthritis of left hip 2021    Type 2 diabetes mellitus with diabetic polyneuropathy, without long-term current use of insulin ([facility]) 2016       PAST SOCIAL HISTORY  Social History     Socioeconomic History    Marital status:      Spouse name: Not on file    Number of children: Not on file    Years of education: Not on file    Highest education level: Not on file   Occupational History    Not on file   Tobacco Use    Smoking status: Former     Packs/day: 1.00     Years: 45.00     Additional pack years: 0.00     Total pack years: 45.00     Types: Cigarettes     Start date: 5/3/2019     Quit date: 2019     Years since quittin.6    Smokeless tobacco: Never   Vaping Use    Vaping Use: Never used   Substance and Sexual Activity    Alcohol use: Not Currently    Drug use: No    Sexual activity: Yes     Partners: Female     Comment: Wife   Other Topics Concern     Service Not Asked    Blood Transfusions Not Asked    Caffeine Concern No     Comment: coffee 2 cups    Occupational Exposure Not Asked    Hobby Hazards Not Asked    Sleep Concern Not Asked    Stress Concern Not Asked    Weight Concern Not Asked    Special Diet Not Asked    Back Care Not Asked    Exercise No     Comment: yardwork    Bike Helmet Not Asked    Seat Belt Not Asked    Self-Exams Not Asked   Social History Narrative    Relationships: Wife    Children:     Pets:     School:     Work:     Origin:     Interests:     Spiritual:              Social Determinants of Health     Financial Resource Strain: Not on file   Food Insecurity: Not on file   Transportation Needs: Not on file   Physical Activity: Not on file   Stress: Not on file   Social Connections: Not on file   Housing Stability: Not on file       PAST SURGICAL HISTORY  Past Surgical History:   Procedure Laterality Date    APPENDECTOMY  2012    ARTHROSCOPY OF JOINT  UNLISTED      COLONOSCOPY  2003    COLONOSCOPY  12/18/2013    COLONOSCOPY N/A 07/10/2020    Procedure: COLONOSCOPY;  Surgeon: Lex Cruz MD;  [facility]: [facility] ENDOSCOPY    ELECTROCARDIOGRAM, COMPLETE  11/28/2012    scanned to media tab    HIP REPLACEMENT SURGERY Left 07/09/2021    KNEE SURGERY Left     Meniscus    TONSILLECTOMY         PAST FAMILY HISTORY  Family History   Problem Relation Age of Onset    Heart Disease Mother     Diabetes Mother     Other (Other) Father         Killed in steel mill accident    Ovarian Cancer Sister     Obesity Sister     No Known Problems Sister     No Known Problems Brother     Cancer Brother         Gallbladder    Heart Disease Brother     Heart Disease Brother     No Known Problems Brother     Arthritis Brother     No Known Problems Brother     No Known Problems Brother     No Known Problems Maternal Grandmother     No Known Problems Maternal Grandfather     No Known Problems Paternal Grandmother     No Known Problems Paternal Grandfather     Breast Cancer Cousin     Colon Cancer Neg     Prostate Cancer Neg          PHYSICAL EXAM  Vitals:    01/04/24 0949 01/04/24 1027   BP: 142/76 126/78   Pulse: 74    Temp: 97.8 °F (36.6 °C)    SpO2: 97%    Weight: 226 lb (102.5 kg)    Height: 5' 6\" (1.676 m)       Body mass index is 36.48 kg/m².    GENERAL: NAD  NECK: Supple, non-tender  RESP: Non-labored respirations, CTAB, no wheezing, no rales, no ronchi  CV: RRR, no murmurs  MSK: No edema  SKIN: Warm and dry, no rashes. Bilateral barefoot skin diabetic exam is normal, visualized feet and the appearance is normal. Bilateral monofilament/sensation of both feet is normal. Pulsation pedal pulse exam of both lower legs/feet is normal as well.  NEURO: Answering questions appropriately    LABS  Lab Results   Component Value Date    WBC 6.7 11/21/2023    HGB 13.6 11/21/2023    HCT 40.4 11/21/2023    .0 11/21/2023    NEPERCENT 59.0 11/21/2023    LYPERCENT 29.5 11/21/2023    MOPERCENT 7.8  11/21/2023    EOPERCENT 3.0 11/21/2023    BAPERCENT 0.4 11/21/2023    NE 3.93 11/21/2023    LYMABS 1.97 11/21/2023    MOABSO 0.52 11/21/2023    EOABSO 0.20 11/21/2023    BAABSO 0.03 11/21/2023       Lab Results   Component Value Date     11/21/2023    K 4.3 11/21/2023     11/21/2023    CO2 27.0 11/21/2023    ANIONGAP 5 11/21/2023    BUN 6 (L) 11/21/2023    CREATSERUM 1.07 11/21/2023    BUNCREA 5.6 (L) 11/21/2023     (H) 11/21/2023    CA 9.2 11/21/2023    OSMOCALC 286 11/21/2023    GFRNAA 67 11/30/2021    GFRAA 77 11/30/2021    ALT 18 11/21/2023    AST 20 11/21/2023    ALKPHO 69 11/21/2023    BILT 0.4 11/21/2023    TP 7.4 11/21/2023    ALB 4.6 11/21/2023    GLOBULIN 2.8 11/21/2023    ELECTAG 1.6 11/21/2023    FASTING Yes 11/21/2023    FASTING Yes 11/21/2023         Lab Results   Component Value Date    CHOLEST 109 11/21/2023    TRIG 77 11/21/2023    HDL 37 (L) 11/21/2023    LDL 56 11/21/2023    VLDL 11 11/21/2023    TCHDLRATIO 4.0 09/28/2015    NONHDLC 72 11/21/2023    CALCNONHDL 118 05/21/2013        DIAGNOSTICS      ASSESSMENT/PLAN  Problem List Items Addressed This Visit          HCC Problems    Diabetes mellitus type 2 without retinopathy (HCC)     Following with ophthalmology.         Relevant Medications    Losartan Potassium-HCTZ 100-12.5 MG Oral Tab    Type 2 diabetes mellitus with hyperglycemia, without long-term current use of insulin ([facility])     Diabetes in November was poorly controlled.  This is due to the fact that he was off dapagliflozin when he ran out of his medications  He is back on dapagliflozin 10 mg daily and metformin ER 1000 mg twice daily.  Follow-up in 2 months to recheck A1c.         Relevant Medications    Losartan Potassium-HCTZ 100-12.5 MG Oral Tab       Cardiac and Vasculature    Agatston coronary artery calcium score between 200 and 399     On statin.         Relevant Medications    Losartan Potassium-HCTZ 100-12.5 MG Oral Tab    Atherosclerosis of native coronary  artery of native heart without angina pectoris     History of cardiac cath in 2012 with a history of coronary disease.  On aspirin 81 mg daily as well as atorvastatin 20 mg nightly.         Relevant Medications    Losartan Potassium-HCTZ 100-12.5 MG Oral Tab    Essential hypertension - Primary     Patient with a history of coronary artery disease as well as hypertension, hypercholesterolemia  Blood pressures are controlled at this time.  On aspirin 81 mg daily  Currently on losartan hydrochlorothiazide 100-12.5 mg daily, amlodipine 10 mg daily, metoprolol succinate 100 mg daily  Also on atorvastatin 20 mg nightly.         Relevant Medications    Losartan Potassium-HCTZ 100-12.5 MG Oral Tab    Hypercholesterolemia     Patient with a history of coronary artery disease as well as hypertension, hypercholesterolemia  Blood pressures are controlled at this time.  On aspirin 81 mg daily  Currently on losartan hydrochlorothiazide 100-12.5 mg daily, amlodipine 10 mg daily, metoprolol succinate 100 mg daily  Also on atorvastatin 20 mg nightly.            Neuro    Lumbar spondylosis     Patient with a history of lumbar spondylosis as well as lumbar bulging disc.  Does have a history of sciatica and neuropathy.  On gabapentin 300 mg 3 times daily which helps with discomfort.  He is overall not in significant pain at this time.  Has seen physiatry in the past  Monitor for now            Gastrointestinal and Abdominal    GERD (gastroesophageal reflux disease)     Patient with a history of very severe acid reflux  Currently on omeprazole 20 mg daily and doing well.            Eye    Age-related nuclear cataract of both eyes     Continue to follow with ophthalmology.         High myopia, bilateral     Following with ophthalmology.         Primary open angle glaucoma (POAG) of both eyes, moderate stage     On latanoprost  Continue to follow with ophthalmology.            Skin    Lichen planus     Patient with a history of lichen  planus  Follows with dermatology  Uses tacrolimus ointment  Also on clobetasol ointment.            Tobacco    History of nicotine dependence    Relevant Orders    CT LUNG LD SCREENING(CPT=71271)       Return in about 2 months (around 3/4/2024) for physical .    No topic due editable text     Kevin Rey MD  Family Medicine           Pre-chartin minutes  Reviewing/obtainin minutes  Medical Exam:1 minutes  Counseling/education: 10 minutes  Notes: 10 minutes  Referring/communicatin minutes  Care coordination: 0 minutes    My total time spent caring for the patient on the day of the encounter: 53 minutes

## 2024-01-04 NOTE — ASSESSMENT & PLAN NOTE
History of cardiac cath in 2012 with a history of coronary disease.  On aspirin 81 mg daily as well as atorvastatin 20 mg nightly.

## 2024-01-04 NOTE — ASSESSMENT & PLAN NOTE
Diabetes in November was poorly controlled.  This is due to the fact that he was off dapagliflozin when he ran out of his medications  He is back on dapagliflozin 10 mg daily and metformin ER 1000 mg twice daily.  Follow-up in 2 months to recheck A1c.

## 2024-01-04 NOTE — ASSESSMENT & PLAN NOTE
Patient with a history of lumbar spondylosis as well as lumbar bulging disc.  Does have a history of sciatica and neuropathy.  On gabapentin 300 mg 3 times daily which helps with discomfort.  He is overall not in significant pain at this time.  Has seen physiatry in the past  Monitor for now

## 2024-01-04 NOTE — ASSESSMENT & PLAN NOTE
Patient with a history of lichen planus  Follows with dermatology  Uses tacrolimus ointment  Also on clobetasol ointment.

## 2024-01-04 NOTE — ASSESSMENT & PLAN NOTE
Patient with a history of coronary artery disease as well as hypertension, hypercholesterolemia  Blood pressures are controlled at this time.  On aspirin 81 mg daily  Currently on losartan hydrochlorothiazide 100-12.5 mg daily, amlodipine 10 mg daily, metoprolol succinate 100 mg daily  Also on atorvastatin 20 mg nightly.

## 2024-01-04 NOTE — ASSESSMENT & PLAN NOTE
Patient with a history of very severe acid reflux  Currently on omeprazole 20 mg daily and doing well.

## 2024-01-04 NOTE — ASSESSMENT & PLAN NOTE
Patient with a recent calcaneal fracture, following with Ortho and has plans to see podiatry moving forward

## 2024-01-04 NOTE — PATIENT INSTRUCTIONS
PATIENT INSTRUCTIONS    Thank you for seeing me today, it was a pleasure taking care of you.  Please check out at the  and schedule a follow up appointment.  Return in about 2 months (around 3/4/2024) for physical .  Please remember that the keyanna period for all appointments is 5 minutes. This is to help maximize the amount of time that we can spend together at our visits.    Please continue all current medications  Losartan hydrochlorothiazide 100-12.5 mg daily - now all combined into one pill   See podiatry   Can schedule lung CT scan for end of February/beginning of March  Please call [phone] to schedule your imaging appointment.       Dr. Candido Armando

## 2024-01-16 ENCOUNTER — PATIENT MESSAGE (OUTPATIENT)
Dept: INTERNAL MEDICINE CLINIC | Facility: CLINIC | Age: 69
End: 2024-01-16

## 2024-01-16 DIAGNOSIS — E11.42 CONTROLLED TYPE 2 DIABETES MELLITUS WITH DIABETIC POLYNEUROPATHY, WITHOUT LONG-TERM CURRENT USE OF INSULIN (HCC): ICD-10-CM

## 2024-01-17 RX ORDER — GABAPENTIN 300 MG/1
300 CAPSULE ORAL 3 TIMES DAILY PRN
Qty: 270 CAPSULE | Refills: 1 | Status: SHIPPED | OUTPATIENT
Start: 2024-01-17

## 2024-01-17 NOTE — TELEPHONE ENCOUNTER
Last OV:1-4-24  Last refill:10-11-23 different provider    Next Appt: With Internal Medicine (Kevin Rey MD)  02/26/2024 at 8:00 AM

## 2024-01-17 NOTE — TELEPHONE ENCOUNTER
From: Cristobal Floyd  To: Kevin Rey  Sent: 1/16/2024 5:06 PM CST  Subject: Refill     Need refill of gabapentin

## 2024-02-26 ENCOUNTER — HOSPITAL ENCOUNTER (OUTPATIENT)
Dept: MRI IMAGING | Facility: HOSPITAL | Age: 69
Discharge: HOME | End: 2024-02-26
Attending: PODIATRIST
Payer: COMMERCIAL

## 2024-02-26 ENCOUNTER — OFFICE VISIT (OUTPATIENT)
Dept: INTERNAL MEDICINE CLINIC | Facility: CLINIC | Age: 69
End: 2024-02-26
Payer: COMMERCIAL

## 2024-02-26 VITALS
HEART RATE: 60 BPM | OXYGEN SATURATION: 98 % | HEIGHT: 66 IN | TEMPERATURE: 98 F | WEIGHT: 224 LBS | DIASTOLIC BLOOD PRESSURE: 76 MMHG | SYSTOLIC BLOOD PRESSURE: 124 MMHG | BODY MASS INDEX: 36 KG/M2

## 2024-02-26 DIAGNOSIS — H25.13 AGE-RELATED NUCLEAR CATARACT OF BOTH EYES: ICD-10-CM

## 2024-02-26 DIAGNOSIS — E11.9 DIABETES MELLITUS TYPE 2 WITHOUT RETINOPATHY (HCC): ICD-10-CM

## 2024-02-26 DIAGNOSIS — Z00.00 HEALTH MAINTENANCE EXAMINATION: Primary | ICD-10-CM

## 2024-02-26 DIAGNOSIS — I10 ESSENTIAL HYPERTENSION: ICD-10-CM

## 2024-02-26 DIAGNOSIS — E78.00 HYPERCHOLESTEROLEMIA: ICD-10-CM

## 2024-02-26 DIAGNOSIS — M47.816 LUMBAR SPONDYLOSIS: ICD-10-CM

## 2024-02-26 DIAGNOSIS — S92.001D CLOSED NONDISPLACED FRACTURE OF RIGHT CALCANEUS WITH ROUTINE HEALING, UNSPECIFIED PORTION OF CALCANEUS, SUBSEQUENT ENCOUNTER: ICD-10-CM

## 2024-02-26 DIAGNOSIS — R93.1 AGATSTON CORONARY ARTERY CALCIUM SCORE BETWEEN 200 AND 399: ICD-10-CM

## 2024-02-26 DIAGNOSIS — E66.01 CLASS 2 SEVERE OBESITY DUE TO EXCESS CALORIES WITH SERIOUS COMORBIDITY AND BODY MASS INDEX (BMI) OF 36.0 TO 36.9 IN ADULT (HCC): ICD-10-CM

## 2024-02-26 DIAGNOSIS — H40.1132 PRIMARY OPEN ANGLE GLAUCOMA (POAG) OF BOTH EYES, MODERATE STAGE: ICD-10-CM

## 2024-02-26 DIAGNOSIS — M65.271 CALCIFIC TENDONITIS OF RIGHT FOOT: ICD-10-CM

## 2024-02-26 DIAGNOSIS — Z87.891 HISTORY OF NICOTINE DEPENDENCE: ICD-10-CM

## 2024-02-26 DIAGNOSIS — K21.9 GASTROESOPHAGEAL REFLUX DISEASE, UNSPECIFIED WHETHER ESOPHAGITIS PRESENT: ICD-10-CM

## 2024-02-26 DIAGNOSIS — H52.13 HIGH MYOPIA, BILATERAL: ICD-10-CM

## 2024-02-26 DIAGNOSIS — S92.001A CALCANEUS FRACTURE, RIGHT: ICD-10-CM

## 2024-02-26 DIAGNOSIS — E11.65 TYPE 2 DIABETES MELLITUS WITH HYPERGLYCEMIA, WITHOUT LONG-TERM CURRENT USE OF INSULIN (HCC): ICD-10-CM

## 2024-02-26 DIAGNOSIS — L43.9 LICHEN PLANUS: ICD-10-CM

## 2024-02-26 DIAGNOSIS — M51.36 BULGING LUMBAR DISC: ICD-10-CM

## 2024-02-26 DIAGNOSIS — I25.10 ATHEROSCLEROSIS OF NATIVE CORONARY ARTERY OF NATIVE HEART WITHOUT ANGINA PECTORIS: ICD-10-CM

## 2024-02-26 PROBLEM — E66.812 CLASS 2 SEVERE OBESITY DUE TO EXCESS CALORIES WITH SERIOUS COMORBIDITY AND BODY MASS INDEX (BMI) OF 36.0 TO 36.9 IN ADULT (HCC): Status: ACTIVE | Noted: 2024-02-26

## 2024-02-26 LAB
ALBUMIN SERPL-MCNC: 5.1 G/DL (ref 3.2–4.8)
ALBUMIN/GLOB SERPL: 1.6 {RATIO} (ref 1–2)
ALP LIVER SERPL-CCNC: 75 U/L
ALT SERPL-CCNC: 23 U/L
ANION GAP SERPL CALC-SCNC: 10 MMOL/L (ref 0–18)
AST SERPL-CCNC: 24 U/L (ref ?–34)
BILIRUB SERPL-MCNC: 0.4 MG/DL (ref 0.2–1.1)
BUN BLD-MCNC: 10 MG/DL (ref 9–23)
BUN/CREAT SERPL: 9.2 (ref 10–20)
CALCIUM BLD-MCNC: 10.3 MG/DL (ref 8.7–10.4)
CHLORIDE SERPL-SCNC: 105 MMOL/L (ref 98–112)
CHOLEST SERPL-MCNC: 135 MG/DL (ref ?–200)
CO2 SERPL-SCNC: 24 MMOL/L (ref 21–32)
CREAT BLD-MCNC: 1.09 MG/DL
EGFRCR SERPLBLD CKD-EPI 2021: 73 ML/MIN/1.73M2 (ref 60–?)
EST. AVERAGE GLUCOSE BLD GHB EST-MCNC: 186 MG/DL (ref 68–126)
FASTING PATIENT LIPID ANSWER: YES
FASTING STATUS PATIENT QL REPORTED: YES
GLOBULIN PLAS-MCNC: 3.2 G/DL (ref 2.8–4.4)
GLUCOSE BLD-MCNC: 145 MG/DL (ref 70–99)
HBA1C MFR BLD: 8.1 % (ref ?–5.7)
HBV SURFACE AB SER QL: NONREACTIVE
HBV SURFACE AB SERPL IA-ACNC: <3.1 MIU/ML
HDLC SERPL-MCNC: 45 MG/DL (ref 40–59)
LDLC SERPL CALC-MCNC: 74 MG/DL (ref ?–100)
NONHDLC SERPL-MCNC: 90 MG/DL (ref ?–130)
OSMOLALITY SERPL CALC.SUM OF ELEC: 290 MOSM/KG (ref 275–295)
POTASSIUM SERPL-SCNC: 4.1 MMOL/L (ref 3.5–5.1)
PROT SERPL-MCNC: 8.3 G/DL (ref 5.7–8.2)
SODIUM SERPL-SCNC: 139 MMOL/L (ref 136–145)
TRIGL SERPL-MCNC: 84 MG/DL (ref 30–149)
VLDLC SERPL CALC-MCNC: 13 MG/DL (ref 0–30)

## 2024-02-26 PROCEDURE — 80053 COMPREHEN METABOLIC PANEL: CPT | Performed by: FAMILY MEDICINE

## 2024-02-26 PROCEDURE — 86706 HEP B SURFACE ANTIBODY: CPT | Performed by: FAMILY MEDICINE

## 2024-02-26 PROCEDURE — 3008F BODY MASS INDEX DOCD: CPT | Performed by: FAMILY MEDICINE

## 2024-02-26 PROCEDURE — 3078F DIAST BP <80 MM HG: CPT | Performed by: FAMILY MEDICINE

## 2024-02-26 PROCEDURE — 83036 HEMOGLOBIN GLYCOSYLATED A1C: CPT | Performed by: FAMILY MEDICINE

## 2024-02-26 PROCEDURE — 80061 LIPID PANEL: CPT | Performed by: FAMILY MEDICINE

## 2024-02-26 PROCEDURE — 3074F SYST BP LT 130 MM HG: CPT | Performed by: FAMILY MEDICINE

## 2024-02-26 PROCEDURE — 73721 MRI JNT OF LWR EXTRE W/O DYE: CPT | Performed by: PODIATRIST

## 2024-02-26 PROCEDURE — 99397 PER PM REEVAL EST PAT 65+ YR: CPT | Performed by: FAMILY MEDICINE

## 2024-02-26 NOTE — PROGRESS NOTES
FAMILY MEDICINE CLINIC NOTE    HPI  Cristobal Floyd is a 68 year old male presenting for physical    #Health Maintenance  -Diet: Good. Not eating fast food. Enjoys cooking - cooks sometimes healthy, sometimes not. BBQ in summer   -Exercise: Was exercising until he hurt his foot. Recently with exercise bike prior to foot injury.   -Lung cancer screen: Indicated - ordered  -Colon cancer screen: - Dr Lex Cruz 7/2020 - repeat in 10 years  -Prostate cancer screen: - 12/2022 within normal limits  -Aortic aneurysm screen: Indicated  -Statin:  Will check lipid panel  -ASA: Not indicated  -HIV screen: Not indicated  -Hep C screen: Indicated  -Gonorrhea/chlamydia:  Not indicated  -Syphillis: Not indicated  -TB: Not indicated  -Tobacco/alcohol: Per below  -Depression: PHQ-2 score of 0 (score >/= 3 do PHQ-9)  -Advanced Directive: Indicated    #Immunizations  -Tdap:  11/2022  -Flu shot:  12/2023  -PCV13:  11/2019    -PCV20:  12/2023    -PPSV23:  10/2018    -HPV: Not indicated  -RZV (preferred) or VZL: Indicated   -RSV: Indicated   -COVID: Indicated      #DM  -Hba1c: 11.5 11/21/23  -Microalbuminuria:11/2023  -B12: 11/2023  -Pneumonia vaccine:Prevnar 20 12/2023  -HepB:Consider future screen  -Eye exam: Dr Contreras   -Diabetic foot exam: 1/4/24 Bilateral barefoot skin diabetic exam is normal, visualized feet and the appearance is normal. Bilateral monofilament/sensation of both feet is normal. Pulsation pedal pulse exam of both lower legs/feet is normal as well.  -Current medications: dapagliflozin 10 mg daily (was off and recently restarted), metformin ER 1000mg twice daily   -Blood sugars:  -AM:      -Noon:   -PM:      -hypoglycemia:     #Calcaneal fracture   -Xray foot 7/2023 - 10-mm cortical deformity along the plantar aspect the anterior-lateral calcaneal body.  The appearance suggests cortical fracture/avulsion.  5 mm linear structure, most likely representing cortical bone is within the soft tissues at the site of  cortical deformity, with differential including form body.  Correlate with mechanism of injury.   -ortho Dr Apodaca  -podiatry Dr Ludwig Wasserman - has MRI ordered     #Lumbar spondylosis  #Lumbar bulging disc   #Neuropathy  -MRI 4/2017 - CONCLUSION: Disease and osteoarthritis within the lumbar spine as above, causing central canal and neuroforaminal narrowing.  Central canal narrowing is most advanced at L3-4.  Neuroforaminal narrowing is most advanced at L4-5.   -history of sciatica   -has had facet injection  -historically radiating from lower back down to left leg   -gabapentin 300 mg three times daily  -controlled   -overall back pain is well controlled without significant issues at this time   -physiatry Dr Hernandez Sanabria        #CAD  #HLD  #HTN  -cardiac cath 2012, being managed medically  -no CP, no SOB  -aspirin 81 mg daily   -losartan hydrochlorothiazide 100-12.5 mg daily   -amlodipine 10 mg daily   -metoprolol succinate 100 mg daily   -atorvastatin 20 mg nightly      #GERD  -omeprazole 20 mg daily  -no issues     #Glaucoma   #Cataracts  #Myopia   -ophthalmology Dr Contreras   -lataonoprost solution     #Lichen planus  -tacrolimus9,1% ointment   -clobetasol 0.0.5% ointment  -triamcinolone 0.1%  -dermatology Clive Shannon      #Smoking history  -quit in 2019  -needs repeat CT lung screen in 3/2024    #Patient Care Team  Patient Care Team:  Kevin Rey MD as PCP - General (Family Medicine)  Jean Pierre Murray DPM (PODIATRIST)  Cali Irwin MD (Physical Medicine)  Daniele Ng MD (Physical Medicine)  Ez Apodaca MD (SURGERY, ORTHOPEDIC)  Ludwig Wasserman (PODIATRIST)  Clive Shannon PA-C (Physician Assistant)  Ja Contreras MD (OPHTHALMOLOGY)    ROS  GENERAL: No fever/chills, no recent weight loss   HEENT: No visual changes, no changes in hearing, no sore throats  NECK: No pain, no swelling  RESP: No cough, no SOB  CV: No chest pain, no palpitations  GI: No abd pain, no N/V/D  MSK: No  edema, no pain  SKIN: No new rashes  NEURO: No numbness, no tingling, no HA    HEALTH MAINTENANCE CHECKLIST  Health Maintenance Topics with due status: Overdue       Topic Date Due    Lung Cancer Screening 02/22/2023    Annual Physical 11/14/2023    Fall Risk Screening (Annual) 01/01/2024    Diabetes Care A1C 02/21/2024       ALLERGIES  Allergies   Allergen Reactions    Cymbalta [Duloxetine Hcl] RASH       MEDICATIONS  Current Outpatient Medications   Medication Sig Dispense Refill    dapagliflozin (FARXIGA) 10 MG Oral Tab Take 1 tablet (10 mg total) by mouth daily. 90 tablet 1    metFORMIN  MG Oral Tablet 24 Hr Take 4 tablets (2,000 mg total) by mouth daily with breakfast. 360 tablet 3    gabapentin 300 MG Oral Cap Take 1 capsule (300 mg total) by mouth 3 (three) times daily as needed. 270 capsule 1    Losartan Potassium-HCTZ 100-12.5 MG Oral Tab Take 1 tablet by mouth daily. 90 tablet 3    clobetasol 0.05 % External Ointment Apply 1 Application  topically daily as needed. Apply to both arms and foot 2x/day as needed 120 g 1    triamcinolone 0.1 % External Ointment Apply 1 Application topically 2 (two) times daily. APPLY TO AFFECTED AREA 80 g 1    amLODIPine 10 MG Oral Tab Take 1 tablet (10 mg total) by mouth daily. 90 tablet 3    omeprazole 20 MG Oral Capsule Delayed Release TAKE 1 CAPSULE BY MOUTH EVERY DAY IN THE MORNING 90 capsule 3    metoprolol succinate  MG Oral Tablet 24 Hr Take 1 tablet (100 mg total) by mouth every evening. 90 tablet 1    LATANOPROST 0.005 % Ophthalmic Solution USE 1 DROP IN BOTH EYES NIGHTLY 7.5 mL 3    atorvastatin 20 MG Oral Tab Take 1 tablet (20 mg total) by mouth daily. 90 tablet 3    Glucose Blood (ACCU-CHEK GUIDE) In Vitro Strip To monitor blood sugar three times daily 300 strip 3    Accu-Chek FastClix Lancets Does not apply Misc To monitor blood sugar three times daily 300 each 3    aspirin 81 MG Oral Tab EC Take 1 tablet (81 mg total) by mouth daily.         ACTIVE  PROBLEM  Patient Active Problem List   Diagnosis    Atherosclerosis of native coronary artery of native heart without angina pectoris    GERD (gastroesophageal reflux disease)    Essential hypertension    Hypercholesterolemia    History of nicotine dependence    Age-related nuclear cataract of both eyes    High myopia, bilateral    Amblyopia, left    Type 2 diabetes mellitus with hyperglycemia, without long-term current use of insulin ([facility])    Diabetes mellitus type 2 without retinopathy ([facility])    Floater, vitreous, bilateral    Lichen planus    Primary open angle glaucoma (POAG) of both eyes, moderate stage    Agatston coronary artery calcium score between 200 and 399    Lumbar spondylosis    Calcaneal fracture    Bulging lumbar disc    Health maintenance examination    Class 2 severe obesity due to excess calories with serious comorbidity and body mass index (BMI) of 36.0 to 36.9 in adult ([facility])       PAST MEDICAL HISTORY  Past Medical History:   Diagnosis Date    Age-related nuclear cataract of both eyes 06/30/2015    Amblyopia-left eye 06/30/2015    Bulging lumbar disc 01/04/2024    Essential hypertension 05/29/2013    Floater, vitreous 06/30/2015    GERD (gastroesophageal reflux disease) 11/13/2013    Glaucoma     High myopia 06/30/2015    Hypercholesterolemia 01/21/2013    Knee injury 2012    Lichen planus 08/15/2019    Meralgia paresthetica of left side 03/03/2020    Primary open angle glaucoma (POAG) of both eyes, moderate stage 06/12/2019    Start Latanoprost qhs OU    Primary osteoarthritis of left hip 01/30/2021    Type 2 diabetes mellitus with diabetic polyneuropathy, without long-term current use of insulin ([facility]) 07/28/2016       PAST SOCIAL HISTORY  Social History     Socioeconomic History    Marital status:      Spouse name: Not on file    Number of children: Not on file    Years of education: Not on file    Highest education level: Not on file   Occupational History    Not on file   Tobacco Use     Smoking status: Former     Packs/day: 1.00     Years: 45.00     Additional pack years: 0.00     Total pack years: 45.00     Types: Cigarettes     Start date: 5/3/2019     Quit date: 2019     Years since quittin.8    Smokeless tobacco: Never   Vaping Use    Vaping Use: Never used   Substance and Sexual Activity    Alcohol use: Not Currently    Drug use: No    Sexual activity: Yes     Partners: Female     Comment: Wife   Other Topics Concern     Service Not Asked    Blood Transfusions Not Asked    Caffeine Concern No     Comment: coffee 2 cups    Occupational Exposure Not Asked    Hobby Hazards Not Asked    Sleep Concern Not Asked    Stress Concern Not Asked    Weight Concern Not Asked    Special Diet Not Asked    Back Care Not Asked    Exercise No     Comment: yardwork    Bike Helmet Not Asked    Seat Belt Not Asked    Self-Exams Not Asked   Social History Narrative    Relationships: Wife- Brandon    Children: Bebo Lennon - adults    Pets: Dog- Becca    School: N/A    Work: Working part time -  at [employer]. Previously working for [employer].     Origin: Originally from [place].     Interests: Enjoys gardening. Spending time with grandchildren. Enjoys cooking. [employer] fan.     Spiritual: Believes in God.      Social Determinants of Health     Financial Resource Strain: Not on file   Food Insecurity: Not on file   Transportation Needs: Not on file   Physical Activity: Not on file   Stress: Not on file   Social Connections: Not on file   Housing Stability: Not on file       PAST SURGICAL HISTORY  Past Surgical History:   Procedure Laterality Date    APPENDECTOMY      ARTHROSCOPY OF JOINT UNLISTED      COLONOSCOPY      COLONOSCOPY  2013    COLONOSCOPY N/A 07/10/2020    Procedure: COLONOSCOPY;  Surgeon: Lex Cruz MD;  [facility]: [facility] ENDOSCOPY    ELECTROCARDIOGRAM, COMPLETE  2012    scanned to media tab    HIP REPLACEMENT SURGERY Left 2021     KNEE SURGERY Left     Meniscus    TONSILLECTOMY         PAST FAMILY HISTORY  Family History   Problem Relation Age of Onset    Heart Disease Mother     Diabetes Mother     Other (Other) Father         Killed in steel mill accident    Ovarian Cancer Sister     Obesity Sister     No Known Problems Sister     No Known Problems Brother     Cancer Brother         Gallbladder    Heart Disease Brother     Heart Disease Brother     No Known Problems Brother     Arthritis Brother     No Known Problems Brother     No Known Problems Brother     No Known Problems Maternal Grandmother     No Known Problems Maternal Grandfather     No Known Problems Paternal Grandmother     No Known Problems Paternal Grandfather     Breast Cancer Cousin     Colon Cancer Neg     Prostate Cancer Neg        PHYSICAL EXAM  Vitals:    02/26/24 0802   BP: 124/76   Pulse: 60   Temp: 98 °F (36.7 °C)   SpO2: 98%   Weight: 224 lb (101.6 kg)   Height: 5' 6\" (1.676 m)      Body mass index is 36.15 kg/m².    GENERAL: NAD  HEENT: Moist mucous membranes, no tonsillar swelling or erythema, PERRLA bilat, TM translucent and non-bulging  NECK: Supple, non-tender  RESP: CTAB, no wheezing, no rales, no ronchi  CV: RRR, no murmurs  GI: Soft, non-distended, non-tender, no guarding, no rebound, no masses  MSK: No edema  SKIN: Warm and dry, no rashes  NEURO: Answering questions appropriately    LABS  Lab Results   Component Value Date    WBC 6.7 11/21/2023    HGB 13.6 11/21/2023    HCT 40.4 11/21/2023    .0 11/21/2023    NEPERCENT 59.0 11/21/2023    LYPERCENT 29.5 11/21/2023    MOPERCENT 7.8 11/21/2023    EOPERCENT 3.0 11/21/2023    BAPERCENT 0.4 11/21/2023    NE 3.93 11/21/2023    LYMABS 1.97 11/21/2023    MOABSO 0.52 11/21/2023    EOABSO 0.20 11/21/2023    BAABSO 0.03 11/21/2023       Lab Results   Component Value Date     11/21/2023    K 4.3 11/21/2023     11/21/2023    CO2 27.0 11/21/2023    ANIONGAP 5 11/21/2023    BUN 6 (L) 11/21/2023     CREATSERUM 1.07 11/21/2023    BUNCREA 5.6 (L) 11/21/2023     (H) 11/21/2023    CA 9.2 11/21/2023    OSMOCALC 286 11/21/2023    GFRNAA 67 11/30/2021    GFRAA 77 11/30/2021    ALT 18 11/21/2023    AST 20 11/21/2023    ALKPHO 69 11/21/2023    BILT 0.4 11/21/2023    TP 7.4 11/21/2023    ALB 4.6 11/21/2023    GLOBULIN 2.8 11/21/2023    ELECTAG 1.6 11/21/2023    FASTING Yes 11/21/2023    FASTING Yes 11/21/2023         Lab Results   Component Value Date    CHOLEST 109 11/21/2023    TRIG 77 11/21/2023    HDL 37 (L) 11/21/2023    LDL 56 11/21/2023    VLDL 11 11/21/2023    TCHDLRATIO 4.0 09/28/2015    NONHDLC 72 11/21/2023    CALCNONHDL 118 05/21/2013        DIAGNOSTICS    ASSESSMENT/PLAN  Problem List Items Addressed This Visit          HCC Problems    Class 2 severe obesity due to excess calories with serious comorbidity and body mass index (BMI) of 36.0 to 36.9 in adult ([facility])     Diet and exercise advised.  Check lab         Diabetes mellitus type 2 without retinopathy ([facility])     Following with ophthalmology.         Relevant Orders    Comp Metabolic Panel (14)    Lipid Panel    Hemoglobin A1C    Hepatitis B Surface Antibody    Type 2 diabetes mellitus with hyperglycemia, without long-term current use of insulin ([facility])     Diabetes in November was poorly controlled.  This is due to the fact that he was off dapagliflozin when he ran out of his medications  Goal A1c less than 7  He is back on dapagliflozin 10 mg daily and metformin ER 1000 mg twice daily.  Check A1c, CMP today.  Will adjust medications depending on upon results.   Follow up in 6 months if A1c less than 7 or worse 3 months if A1c greater than 7.            Cardiac and Vasculature    Agatston coronary artery calcium score between 200 and 399     On statin.         Atherosclerosis of native coronary artery of native heart without angina pectoris     History of cardiac cath in 2012 with a history of coronary disease.  On aspirin 81 mg daily as well as  atorvastatin 20 mg nightly.         Essential hypertension     Patient with a history of coronary artery disease as well as hypertension, hypercholesterolemia  Blood pressures are controlled at this time.  On aspirin 81 mg daily  Currently on losartan hydrochlorothiazide 100-12.5 mg daily, amlodipine 10 mg daily, metoprolol succinate 100 mg daily  Also on atorvastatin 20 mg nightly.         Hypercholesterolemia     Patient with a history of coronary artery disease as well as hypertension, hypercholesterolemia  Blood pressures are controlled at this time.  On aspirin 81 mg daily  Currently on losartan hydrochlorothiazide 100-12.5 mg daily, amlodipine 10 mg daily, metoprolol succinate 100 mg daily  Also on atorvastatin 20 mg nightly.            Neuro    Bulging lumbar disc     Patient with a history of lumbar spondylosis as well as lumbar bulging disc.  Does have a history of sciatica and neuropathy.  On gabapentin 300 mg 3 times daily which helps with discomfort.  He is overall not in significant pain at this time.  Has seen physiatry in the past  Monitor for now         Lumbar spondylosis     Patient with a history of lumbar spondylosis as well as lumbar bulging disc.  Does have a history of sciatica and neuropathy.  On gabapentin 300 mg 3 times daily which helps with discomfort.  He is overall not in significant pain at this time.  Has seen physiatry in the past  Monitor for now            Gastrointestinal and Abdominal    GERD (gastroesophageal reflux disease)     Patient with a history of very severe acid reflux  Currently on omeprazole 20 mg daily and doing well.            Musculoskeletal and Injuries    Calcaneal fracture     Patient with a recent calcaneal fracture, following with Ortho and has plans to see podiatry moving forward            Eye    Age-related nuclear cataract of both eyes     Continue to follow with ophthalmology.         High myopia, bilateral     Following with ophthalmology.          Primary open angle glaucoma (POAG) of both eyes, moderate stage     On latanoprost  Continue to follow with ophthalmology.            Skin    Lichen planus     Patient with a history of lichen planus  Follows with dermatology  Uses tacrolimus ointment  Also on clobetasol ointment.            Tobacco    History of nicotine dependence     Check CT lung screen.  AAA ultrasound         Relevant Orders    US ABDOMINAL AORTIC ANEURYSM SCREENING (CPT=76706)       Health Encounters    Health maintenance examination - Primary     Exercise and diet advised.  CMP, lipid panel, [id number] C screen  Shingles vaccine advised  RSV vaccine advised  Advanced directive information provided.  CT lung screen ordered.  AAA screen ordered.         Relevant Orders    US ABDOMINAL AORTIC ANEURYSM SCREENING (CPT=76706)    Comp Metabolic Panel (14)    Lipid Panel    Hemoglobin A1C       Return in about 6 months (around 8/26/2024) for follow up.    Kevin Rey MD  Family Medicine

## 2024-02-26 NOTE — ASSESSMENT & PLAN NOTE
Exercise and diet advised.  CMP, lipid panel, [id number] C screen  Shingles vaccine advised  RSV vaccine advised  Advanced directive information provided.  CT lung screen ordered.  AAA screen ordered.

## 2024-02-26 NOTE — ASSESSMENT & PLAN NOTE
Diabetes in November was poorly controlled.  This is due to the fact that he was off dapagliflozin when he ran out of his medications  Goal A1c less than 7  He is back on dapagliflozin 10 mg daily and metformin ER 1000 mg twice daily.  Check A1c, CMP today.  Will adjust medications depending on upon results.   Follow up in 6 months if A1c less than 7 or worse 3 months if A1c greater than 7.

## 2024-02-26 NOTE — PATIENT INSTRUCTIONS
PATIENT INSTRUCTIONS    Thank you for seeing me today, it was a pleasure taking care of you.  Please check out at the  and schedule a follow up appointment.  Return in about 6 months (around 8/26/2024) for follow up.  Please remember that the keyanna period for all appointments is 5 minutes. This is to help maximize the amount of time that we can spend together at our visits.    Please get your labs drawn - you may need to schedule a lab appointment if this was not completed at your recent doctor's visit.  The following imaging studies were ordered: CT lungs, AAA ultrasound  Please call [phone] to schedule your imaging appointment.   Please also follow up with the following specialists: Podiatry, Ophthalmology, Dermatology   Please fill out the advance directive information (power of  documents) and bring a copy to our clinic.  Focus on a healthy diet  Exercise when able  Continue current medications for now  Will adjust as needed - I will call you     Best,   Dr. Rey

## 2024-03-11 DIAGNOSIS — I10 ESSENTIAL HYPERTENSION: ICD-10-CM

## 2024-03-11 DIAGNOSIS — E11.42 CONTROLLED TYPE 2 DIABETES MELLITUS WITH DIABETIC POLYNEUROPATHY, WITHOUT LONG-TERM CURRENT USE OF INSULIN (HCC): ICD-10-CM

## 2024-03-11 RX ORDER — GABAPENTIN 300 MG/1
300 CAPSULE ORAL 3 TIMES DAILY PRN
Qty: 270 CAPSULE | Refills: 1 | Status: SHIPPED | OUTPATIENT
Start: 2024-03-11

## 2024-03-11 RX ORDER — METOPROLOL SUCCINATE 100 MG/1
100 TABLET, EXTENDED RELEASE ORAL EVERY EVENING
Qty: 90 TABLET | Refills: 0 | Status: SHIPPED | OUTPATIENT
Start: 2024-03-11

## 2024-03-11 RX ORDER — METFORMIN HYDROCHLORIDE 500 MG/1
2000 TABLET, EXTENDED RELEASE ORAL
Qty: 360 TABLET | Refills: 1 | Status: CANCELLED | OUTPATIENT
Start: 2024-03-11

## 2024-03-11 RX ORDER — LOSARTAN POTASSIUM AND HYDROCHLOROTHIAZIDE 12.5; 1 MG/1; MG/1
1 TABLET ORAL DAILY
Qty: 90 TABLET | Refills: 1 | Status: SHIPPED | OUTPATIENT
Start: 2024-03-11

## 2024-03-11 NOTE — TELEPHONE ENCOUNTER
Patient called for refills:    Gabapentin 300 MG Oral Cap    Losartan Potassium-hydrochlorothiazide 100-12.5     Metformin Succinate  MG Oral      [id number] - [place], [place] - [address] AT [facility], [phone], [phone]   [address] [place] [id number]   Phone: [phone] Fax: [phone]   Hours: Open 24 hours

## 2024-03-14 ENCOUNTER — OFFICE VISIT (OUTPATIENT)
Dept: OPHTHALMOLOGY | Facility: CLINIC | Age: 69
End: 2024-03-14
Payer: COMMERCIAL

## 2024-03-14 DIAGNOSIS — H40.1132 PRIMARY OPEN ANGLE GLAUCOMA (POAG) OF BOTH EYES, MODERATE STAGE: Primary | ICD-10-CM

## 2024-03-14 PROCEDURE — 99213 OFFICE O/P EST LOW 20 MIN: CPT | Performed by: OPHTHALMOLOGY

## 2024-03-14 NOTE — PROGRESS NOTES
Cristobal Floyd is a 68 year old male.    HPI:     HPI    Patient is here for an IOP check. He is taking  Latanoprost in both eyes at bedtime as directed. Pt states that his vision is stable with his glasses and has no complaints.    Last edited by Anusha Dunlap OT on 3/14/2024  8:25 AM.        Patient History:  Past Medical History:   Diagnosis Date    Age-related nuclear cataract of both eyes 06/30/2015    Amblyopia-left eye 06/30/2015    Bulging lumbar disc 01/04/2024    Essential hypertension 05/29/2013    Floater, vitreous 06/30/2015    GERD (gastroesophageal reflux disease) 11/13/2013    Glaucoma     High myopia 06/30/2015    Hypercholesterolemia 01/21/2013    Knee injury 2012    Lichen planus 08/15/2019    Meralgia paresthetica of left side 03/03/2020    Primary open angle glaucoma (POAG) of both eyes, moderate stage 06/12/2019    Start Latanoprost qhs OU    Primary osteoarthritis of left hip 01/30/2021    Type 2 diabetes mellitus with diabetic polyneuropathy, without long-term current use of insulin ([facility]) 07/28/2016       Surgical History: Cristobal Floyd has a past surgical history that includes electrocardiogram, complete (11/28/2012) (scanned to media tab); appendectomy (2012); knee surgery (Left) (Meniscus); colonoscopy (2003); colonoscopy (12/18/2013); colonoscopy (N/A, 07/10/2020) (Procedure: COLONOSCOPY;  Surgeon: Lex Cruz MD;  Location: [facility]); arthroscopy of joint unlisted; tonsillectomy; and hip replacement surgery (Left, 07/09/2021).    Family History   Problem Relation Age of Onset    Heart Disease Mother     Diabetes Mother     Other (Other) Father         Killed in steel mill accident    Ovarian Cancer Sister     Obesity Sister     No Known Problems Sister     No Known Problems Brother     Cancer Brother         Gallbladder    Heart Disease Brother     Heart Disease Brother     No Known Problems Brother     Arthritis Brother     No Known Problems Brother     No Known  Problems Brother     No Known Problems Maternal Grandmother     No Known Problems Maternal Grandfather     No Known Problems Paternal Grandmother     No Known Problems Paternal Grandfather     Breast Cancer Cousin     Colon Cancer Neg     Prostate Cancer Neg        Social History:   Social History     Socioeconomic History    Marital status:    Tobacco Use    Smoking status: Former     Packs/day: 1.00     Years: 45.00     Additional pack years: 0.00     Total pack years: 45.00     Types: Cigarettes     Start date: 5/3/2019     Quit date: 2019     Years since quittin.8    Smokeless tobacco: Never   Vaping Use    Vaping Use: Never used   Substance and Sexual Activity    Alcohol use: Not Currently    Drug use: No    Sexual activity: Yes     Partners: Female     Comment: Wife   Other Topics Concern    Caffeine Concern No     Comment: coffee 2 cups    Exercise No     Comment: yardwork   Social History Narrative    Relationships: Wife- Brandon    Children: Bebo Lennon - adults    Pets: Dog- Becca    School: N/A    Work: Working part time -  at [employer]. Previously working for [employer].     Origin: Originally from [place].     Interests: Enjoys gardening. Spending time with grandchildren. Enjoys cooking. [employer] fan.     Spiritual: Believes in God.        Medications:  Current Outpatient Medications   Medication Sig Dispense Refill    gabapentin 300 MG Oral Cap Take 1 capsule (300 mg total) by mouth 3 (three) times daily as needed. 270 capsule 1    Losartan Potassium-HCTZ 100-12.5 MG Oral Tab Take 1 tablet by mouth daily. 90 tablet 1    metoprolol succinate  MG Oral Tablet 24 Hr Take 1 tablet (100 mg total) by mouth every evening. 90 tablet 0    semaglutide (OZEMPIC, 0.25 OR 0.5 MG/DOSE,) 2 MG/3ML Subcutaneous Solution Pen-injector Inject 0.25 mg into the skin once a week. 1 each 12    clobetasol 0.05 % External Ointment Apply 1 Application  topically daily as needed.  Apply to both arms and foot 2x/day as needed 120 g 1    triamcinolone 0.1 % External Ointment Apply 1 Application topically 2 (two) times daily. APPLY TO AFFECTED AREA 80 g 1    dapagliflozin (FARXIGA) 10 MG Oral Tab Take 1 tablet (10 mg total) by mouth daily. 90 tablet 1    amLODIPine 10 MG Oral Tab Take 1 tablet (10 mg total) by mouth daily. 90 tablet 3    omeprazole 20 MG Oral Capsule Delayed Release TAKE 1 CAPSULE BY MOUTH EVERY DAY IN THE MORNING 90 capsule 3    LATANOPROST 0.005 % Ophthalmic Solution USE 1 DROP IN BOTH EYES NIGHTLY 7.5 mL 3    atorvastatin 20 MG Oral Tab Take 1 tablet (20 mg total) by mouth daily. 90 tablet 3    metFORMIN  MG Oral Tablet 24 Hr Take 4 tablets (2,000 mg total) by mouth daily with breakfast. 360 tablet 3    Glucose Blood (ACCU-CHEK GUIDE) In Vitro Strip To monitor blood sugar three times daily 300 strip 3    Accu-Chek FastClix Lancets Does not apply Misc To monitor blood sugar three times daily 300 each 3    aspirin 81 MG Oral Tab EC Take 1 tablet (81 mg total) by mouth daily.         Allergies:  Allergies   Allergen Reactions    Cymbalta [Duloxetine Hcl] RASH       ROS:       PHYSICAL EXAM:     Base Eye Exam       Visual Acuity (Snellen - Linear)         Right Left    Dist cc 20/50 20/50    Dist ph cc 20/40 20/30      Correction: Glasses              Tonometry (Applanation, 8:31 AM)         Right Left    Pressure 16 16              Pachymetry (6/12/2019)         Right Left    Thickness 564/-1 571/-2              Pupils         Pupils    Right PERRL    Left PERRL              Neuro/Psych       Oriented x3: Yes                  Slit Lamp and Fundus Exam       External Exam         Right Left    External Normal Normal              Slit Lamp Exam         Right Left    Lids/Lashes Dermatochalasis, Meibomian gland dysfunction Dermatochalasis, Meibomian gland dysfunction    Conjunctiva/Sclera ocular melanosis 360 ocular melanosis 360     Cornea clear- no KP, 2+ arcus clear- no  KP, 2+ arcus    Anterior Chamber Deep and quiet Deep and quiet    Iris No transillumination defects No transillumination defects    Lens + pigment on anterior capsule , 1+ Nuclear sclerosis + pigment on anterior capsule , 1+ Nuclear sclerosis, Trace Cortical cataract infeiorly              Fundus Exam         Right Left    Disc Sloping margin, Peripapillary atrophy Sloping margin, Peripapillary atrophy    C/D Ratio 0.7 0.6                  Refraction       Wearing Rx         Sphere Cylinder Axis Add    Right -10.50 +2.50 170 +2.75    Left -12.75 +1.75 030 +2.75      Type: Progressive bifocal                     ASSESSMENT/PLAN:     Diagnoses and Plan:     Primary open angle glaucoma (POAG) of both eyes, moderate stage  Intraocular pressure stable, tolerating medications.    Will continue same regimen of Latanoprost at bedtime in both eyes.    Will have patient back in 4 months for a visual field, OCT and pressure check.      Orders Placed This Encounter   Procedures    June Visual Field - OU - Both Eyes    OCT, Optic Nerve - OU - Both Eyes       Meds This Visit:  Requested Prescriptions      No prescriptions requested or ordered in this encounter        Follow up instructions:  Return in about 4 months (around 7/14/2024) for Visual Field, OCT, IOP check.    3/14/2024  Scribed by: Ja Contreras MD

## 2024-04-07 RX ORDER — LATANOPROST 50 UG/ML
SOLUTION/ DROPS OPHTHALMIC
Qty: 3 EACH | Refills: 3 | Status: SHIPPED | OUTPATIENT
Start: 2024-04-07

## 2024-04-22 ENCOUNTER — HOSPITAL ENCOUNTER (OUTPATIENT)
Dept: CT IMAGING | Facility: HOSPITAL | Age: 69
Discharge: HOME | End: 2024-04-22
Attending: FAMILY MEDICINE
Payer: COMMERCIAL

## 2024-04-22 ENCOUNTER — HOSPITAL ENCOUNTER (OUTPATIENT)
Dept: ULTRASOUND IMAGING | Facility: HOSPITAL | Age: 69
Discharge: HOME | End: 2024-04-22
Attending: FAMILY MEDICINE
Payer: COMMERCIAL

## 2024-04-22 DIAGNOSIS — Z87.891 HISTORY OF NICOTINE DEPENDENCE: ICD-10-CM

## 2024-04-22 DIAGNOSIS — Z00.00 HEALTH MAINTENANCE EXAMINATION: ICD-10-CM

## 2024-04-22 PROCEDURE — 71271 CT THORAX LUNG CANCER SCR C-: CPT | Performed by: FAMILY MEDICINE

## 2024-04-22 PROCEDURE — 76706 US ABDL AORTA SCREEN AAA: CPT | Performed by: FAMILY MEDICINE

## 2024-05-31 ENCOUNTER — PATIENT MESSAGE (OUTPATIENT)
Dept: INTERNAL MEDICINE CLINIC | Facility: CLINIC | Age: 69
End: 2024-05-31

## 2024-05-31 DIAGNOSIS — E11.42 CONTROLLED TYPE 2 DIABETES MELLITUS WITH DIABETIC POLYNEUROPATHY, WITHOUT LONG-TERM CURRENT USE OF INSULIN (HCC): ICD-10-CM

## 2024-06-01 NOTE — TELEPHONE ENCOUNTER
A refill request was received for:  Requested Prescriptions     Pending Prescriptions Disp Refills    metFORMIN  MG Oral Tablet 24 Hr 360 tablet 3     Sig: Take 4 tablets (2,000 mg total) by mouth daily with breakfast.     Last refill date:  3/22/23    Last office visit: 2/26/24      Future Appointments   Date Time Provider Department Center   8/7/2024  8:00 AM Kevin Rey MD [facility] [address]   8/8/2024  9:00 AM Ja Contreras MD [facility]

## 2024-06-01 NOTE — TELEPHONE ENCOUNTER
From: Cristobal Floyd  To: Kevin Rey  Sent: 5/31/2024 1:59 PM CDT  Subject: Metformin refill    Need refill my chart won’t let me access because it’s was last ordered by Dr Nunez

## 2024-06-03 RX ORDER — METFORMIN HYDROCHLORIDE 500 MG/1
2000 TABLET, EXTENDED RELEASE ORAL
Qty: 360 TABLET | Refills: 3 | Status: SHIPPED | OUTPATIENT
Start: 2024-06-03

## 2024-07-11 DIAGNOSIS — E11.42 TYPE 2 DIABETES MELLITUS WITH DIABETIC POLYNEUROPATHY, WITHOUT LONG-TERM CURRENT USE OF INSULIN (HCC): ICD-10-CM

## 2024-07-11 DIAGNOSIS — I10 ESSENTIAL HYPERTENSION: ICD-10-CM

## 2024-07-11 DIAGNOSIS — K21.9 GASTROESOPHAGEAL REFLUX DISEASE: ICD-10-CM

## 2024-07-11 DIAGNOSIS — I25.10 ATHEROSCLEROSIS OF NATIVE CORONARY ARTERY OF NATIVE HEART WITHOUT ANGINA PECTORIS: ICD-10-CM

## 2024-07-11 DIAGNOSIS — E11.42 CONTROLLED TYPE 2 DIABETES MELLITUS WITH DIABETIC POLYNEUROPATHY, WITHOUT LONG-TERM CURRENT USE OF INSULIN (HCC): ICD-10-CM

## 2024-07-11 RX ORDER — AMLODIPINE BESYLATE 10 MG/1
10 TABLET ORAL DAILY
Qty: 90 TABLET | Refills: 3 | Status: SHIPPED | OUTPATIENT
Start: 2024-07-11

## 2024-07-11 RX ORDER — GABAPENTIN 300 MG/1
300 CAPSULE ORAL 3 TIMES DAILY PRN
Qty: 270 CAPSULE | Refills: 0 | Status: SHIPPED | OUTPATIENT
Start: 2024-07-11

## 2024-07-11 RX ORDER — LANCETS
EACH MISCELLANEOUS
Qty: 300 EACH | Refills: 3 | Status: SHIPPED | OUTPATIENT
Start: 2024-07-11

## 2024-07-11 RX ORDER — ATORVASTATIN CALCIUM 20 MG/1
20 TABLET, FILM COATED ORAL DAILY
Qty: 90 TABLET | Refills: 3 | Status: SHIPPED | OUTPATIENT
Start: 2024-07-11

## 2024-07-11 RX ORDER — OMEPRAZOLE 20 MG/1
CAPSULE, DELAYED RELEASE ORAL
Qty: 90 CAPSULE | Refills: 3 | Status: SHIPPED | OUTPATIENT
Start: 2024-07-11

## 2024-07-11 RX ORDER — DAPAGLIFLOZIN 10 MG/1
10 TABLET, FILM COATED ORAL DAILY
Qty: 90 TABLET | Refills: 1 | Status: SHIPPED | OUTPATIENT
Start: 2024-07-11

## 2024-07-11 RX ORDER — TRIAMCINOLONE ACETONIDE 1 MG/G
1 OINTMENT TOPICAL 2 TIMES DAILY
Qty: 80 G | Refills: 1 | Status: SHIPPED | OUTPATIENT
Start: 2024-07-11

## 2024-07-11 NOTE — TELEPHONE ENCOUNTER
Patient requesting refill on all previous medications managed.     Reorder the once I was able to see his previous md refill and pended for approval       Last OV:2-26-24    Last refill:12--2023      Next Appt: With Internal Medicine (Kevin Rey MD)  08/07/2024 at 8:00 AM

## 2024-07-11 NOTE — TELEPHONE ENCOUNTER
Pt called and asked if Dr. Rey could refill/prescribe the medications that Dr. Kaufman used to give him. Pt mentioned that he is getting messages that the medications are being rejected from the pharmacy and is hoping that he can get the medication switched over to Dr. Rey and refilled.

## 2024-07-11 NOTE — TELEPHONE ENCOUNTER
Please clarify which medication needs to be refill and refill request can be sent for review thank you

## 2024-08-07 ENCOUNTER — MED REC SCAN ONLY (OUTPATIENT)
Dept: INTERNAL MEDICINE CLINIC | Facility: CLINIC | Age: 69
End: 2024-08-07

## 2024-08-07 ENCOUNTER — OFFICE VISIT (OUTPATIENT)
Dept: INTERNAL MEDICINE CLINIC | Facility: CLINIC | Age: 69
End: 2024-08-07
Payer: COMMERCIAL

## 2024-08-07 VITALS
TEMPERATURE: 98 F | OXYGEN SATURATION: 98 % | SYSTOLIC BLOOD PRESSURE: 128 MMHG | DIASTOLIC BLOOD PRESSURE: 76 MMHG | WEIGHT: 223 LBS | HEART RATE: 69 BPM | BODY MASS INDEX: 36 KG/M2

## 2024-08-07 DIAGNOSIS — Z51.5 END OF LIFE CARE: ICD-10-CM

## 2024-08-07 DIAGNOSIS — Z00.00 HEALTH MAINTENANCE EXAMINATION: ICD-10-CM

## 2024-08-07 DIAGNOSIS — E11.9 DIABETES MELLITUS TYPE 2 WITHOUT RETINOPATHY (HCC): ICD-10-CM

## 2024-08-07 DIAGNOSIS — E11.9 TYPE 2 DIABETES MELLITUS WITHOUT COMPLICATION, WITHOUT LONG-TERM CURRENT USE OF INSULIN (HCC): Primary | ICD-10-CM

## 2024-08-07 DIAGNOSIS — E66.01 CLASS 2 SEVERE OBESITY DUE TO EXCESS CALORIES WITH SERIOUS COMORBIDITY AND BODY MASS INDEX (BMI) OF 35.0 TO 35.9 IN ADULT (HCC): ICD-10-CM

## 2024-08-07 DIAGNOSIS — E78.00 HYPERCHOLESTEROLEMIA: ICD-10-CM

## 2024-08-07 DIAGNOSIS — I10 ESSENTIAL HYPERTENSION: ICD-10-CM

## 2024-08-07 PROBLEM — E66.812 CLASS 2 SEVERE OBESITY DUE TO EXCESS CALORIES WITH SERIOUS COMORBIDITY AND BODY MASS INDEX (BMI) OF 35.0 TO 35.9 IN ADULT (HCC): Status: ACTIVE | Noted: 2024-02-26

## 2024-08-07 LAB — HEMOGLOBIN A1C: 6.1 % (ref 4.3–5.6)

## 2024-08-07 PROCEDURE — 3044F HG A1C LEVEL LT 7.0%: CPT | Performed by: FAMILY MEDICINE

## 2024-08-07 PROCEDURE — 3078F DIAST BP <80 MM HG: CPT | Performed by: FAMILY MEDICINE

## 2024-08-07 PROCEDURE — 3074F SYST BP LT 130 MM HG: CPT | Performed by: FAMILY MEDICINE

## 2024-08-07 PROCEDURE — 90471 IMMUNIZATION ADMIN: CPT | Performed by: FAMILY MEDICINE

## 2024-08-07 PROCEDURE — 83036 HEMOGLOBIN GLYCOSYLATED A1C: CPT | Performed by: FAMILY MEDICINE

## 2024-08-07 PROCEDURE — 3052F HG A1C>EQUAL 8.0%<EQUAL 9.0%: CPT | Performed by: FAMILY MEDICINE

## 2024-08-07 PROCEDURE — 90750 HZV VACC RECOMBINANT IM: CPT | Performed by: FAMILY MEDICINE

## 2024-08-07 PROCEDURE — 90472 IMMUNIZATION ADMIN EACH ADD: CPT | Performed by: FAMILY MEDICINE

## 2024-08-07 PROCEDURE — 99214 OFFICE O/P EST MOD 30 MIN: CPT | Performed by: FAMILY MEDICINE

## 2024-08-07 PROCEDURE — 90746 HEPB VACCINE 3 DOSE ADULT IM: CPT | Performed by: FAMILY MEDICINE

## 2024-08-07 RX ORDER — SEMAGLUTIDE 0.68 MG/ML
0.5 INJECTION, SOLUTION SUBCUTANEOUS WEEKLY
Qty: 12 EACH | Refills: 3 | Status: SHIPPED | OUTPATIENT
Start: 2024-08-07

## 2024-08-07 NOTE — PROGRESS NOTES
FAMILY MEDICINE CLINIC NOTE    HPI  Cristobal Floyd is a 69 year old male presenting for       #DM  -Hba1c: 11.5 11/21/23 ,2/2024 8.1, 8/7/24 6.1  -Microalbuminuria:11/2023  -B12: 11/2023  -Pneumonia vaccine:Prevnar 20 12/2023  -HepB: Indicated vaccine  -Eye exam: Dr Contreras appointment tomorrow. History of open angle glaucoma  -Diabetic foot exam: 1/4/24 Bilateral barefoot skin diabetic exam is normal, visualized feet and the appearance is normal. Bilateral monofilament/sensation of both feet is normal. Pulsation pedal pulse exam of both lower legs/feet is normal as well.  -Current medications: dapagliflozin 10 mg daily, metformin ER 1000mg twice daily , ozempic 0.25 mg weekly  -Blood sugars:  -AM:      -Noon:   -PM:      -hypoglycemia:    #CAD  #HLD  #HTN  -cardiac cath 2012, being managed medically  -no CP, no SOB  -aspirin 81 mg daily   -losartan hydrochlorothiazide 100-12.5 mg daily   -amlodipine 10 mg daily   -metoprolol succinate 100 mg daily   -atorvastatin 20 mg nightly          #End of life cares  -POA is Judy Floyd (wife) followed by Caity Izabel Floyd   -quality of life is imost important to him    ---other     #Calcaneal fracture   -Xray foot 7/2023 - 10-mm cortical deformity along the plantar aspect the anterior-lateral calcaneal body.  The appearance suggests cortical fracture/avulsion.  5 mm linear structure, most likely representing cortical bone is within the soft tissues at the site of cortical deformity, with differential including form body.  Correlate with mechanism of injury.   -ortho Dr Apodaca  -podiatry Dr Ludwig Wasserman - has MRI ordered     #Lumbar spondylosis  #Lumbar bulging disc   #Neuropathy  -MRI 4/2017 - CONCLUSION: Disease and osteoarthritis within the lumbar spine as above, causing central canal and neuroforaminal narrowing.  Central canal narrowing is most advanced at L3-4.  Neuroforaminal narrowing is most advanced at L4-5.   -history of sciatica    -has had facet injection  -historically radiating from lower back down to left leg   -gabapentin 300 mg three times daily  -controlled   -overall back pain is well controlled without significant issues at this time   -physiatry Dr Hernandez Sanabria       #GERD  -omeprazole 20 mg daily  -no issues     #Glaucoma   #Cataracts  #Myopia   -ophthalmology Dr Contreras   -lataonoprost solution     #Lichen planus  -tacrolimus9,1% ointment   -clobetasol 0.0.5% ointment  -triamcinolone 0.1%  -dermatology Jorgetania Shiva      #Smoking history  -quit in 2019  -needs repeat CT lung screen in 4/2025      ROS  GENERAL: No fever/chills, no recent weight loss  HEENT: No visual changes, no changes in hearing, no sore throats  NECK: No pain, no swelling  RESP: No cough, no SOB  CV: No chest pain, no palpitations  GI: No abd pain, no N/V/D  MSK: No edema  SKIN: No new rashes  NEURO: No numbness, no tingling, no headaches    HEALTH MAINTENANCE  Health Maintenance Topics with due status: Overdue       Topic Date Due    Zoster Vaccines Never done    Diabetes Care A1C 05/26/2024     Health Maintenance Topics with due status: Due Soon       Topic Date Due    Diabetes Care Dilated Eye Exam 11/02/2024       ALLERGIES  Allergies   Allergen Reactions    Cymbalta [Duloxetine Hcl] RASH       MEDICATIONS  Current Outpatient Medications   Medication Sig Dispense Refill    semaglutide (OZEMPIC, 0.25 OR 0.5 MG/DOSE,) 2 MG/3ML Subcutaneous Solution Pen-injector Inject 0.5 mg into the skin once a week. 12 each 3    LATANOPROST 0.005 % Ophthalmic Solution USE 1 DROP IN BOTH EYES NIGHTLY 3 each 3    gabapentin 300 MG Oral Cap Take 1 capsule (300 mg total) by mouth 3 (three) times daily as needed. 270 capsule 0    amLODIPine 10 MG Oral Tab Take 1 tablet (10 mg total) by mouth daily. 90 tablet 3    Accu-Chek FastClix Lancets Does not apply Misc To monitor blood sugar three times daily 300 each 3    atorvastatin 20 MG Oral Tab Take 1 tablet (20 mg total) by mouth  daily. 90 tablet 3    dapagliflozin (FARXIGA) 10 MG Oral Tab Take 1 tablet (10 mg total) by mouth daily. 90 tablet 1    omeprazole 20 MG Oral Capsule Delayed Release TAKE 1 CAPSULE BY MOUTH EVERY DAY IN THE MORNING 90 capsule 3    triamcinolone 0.1 % External Ointment Apply 1 Application topically 2 (two) times daily. APPLY TO AFFECTED AREA 80 g 1    metFORMIN  MG Oral Tablet 24 Hr Take 4 tablets (2,000 mg total) by mouth daily with breakfast. 360 tablet 3    Losartan Potassium-HCTZ 100-12.5 MG Oral Tab Take 1 tablet by mouth daily. 90 tablet 1    metoprolol succinate  MG Oral Tablet 24 Hr Take 1 tablet (100 mg total) by mouth every evening. 90 tablet 0    clobetasol 0.05 % External Ointment Apply 1 Application  topically daily as needed. Apply to both arms and foot 2x/day as needed 120 g 1    Glucose Blood (ACCU-CHEK GUIDE) In Vitro Strip To monitor blood sugar three times daily 300 strip 3    aspirin 81 MG Oral Tab EC Take 1 tablet (81 mg total) by mouth daily.         ACTIVE PROBLEMS  Patient Active Problem List   Diagnosis    Atherosclerosis of native coronary artery of native heart without angina pectoris    GERD (gastroesophageal reflux disease)    Essential hypertension    Hypercholesterolemia    History of nicotine dependence    Age-related nuclear cataract of both eyes    High myopia, bilateral    Amblyopia, left    Type 2 diabetes mellitus without complication, without long-term current use of insulin ([facility])    Diabetes mellitus type 2 without retinopathy ([facility])    Floater, vitreous, bilateral    Lichen planus    Primary open angle glaucoma (POAG) of both eyes, moderate stage    Agatston coronary artery calcium score between 200 and 399    Lumbar spondylosis    Calcaneal fracture    Bulging lumbar disc    Health maintenance examination    Class 2 severe obesity due to excess calories with serious comorbidity and body mass index (BMI) of 35.0 to 35.9 in adult ([facility])    End of life care       PAST  MEDICAL HISTORY  Past Medical History:    Age-related nuclear cataract of both eyes    Amblyopia-left eye    Bulging lumbar disc    End of life care    Patient with advanced directive forms completed  Desires power of  to be Judy Floyd (wife) followed by Caity Floyd   Quality of life is most important to him.       Essential hypertension    Floater, vitreous    GERD (gastroesophageal reflux disease)    Glaucoma    High myopia    Hypercholesterolemia    Knee injury    Lichen planus    Meralgia paresthetica of left side    Primary open angle glaucoma (POAG) of both eyes, moderate stage    Start Latanoprost qhs OU    Primary osteoarthritis of left hip    Type 2 diabetes mellitus with diabetic polyneuropathy, without long-term current use of insulin ([facility])       PAST SOCIAL HISTORY  Social History     Socioeconomic History    Marital status:      Spouse name: Not on file    Number of children: Not on file    Years of education: Not on file    Highest education level: Not on file   Occupational History    Not on file   Tobacco Use    Smoking status: Former     Current packs/day: 0.00     Average packs/day: 1 pack/day for 45.0 years (45.0 ttl pk-yrs)     Types: Cigarettes     Start date: 5/3/2019     Quit date: 2019     Years since quittin.2    Smokeless tobacco: Never   Vaping Use    Vaping status: Never Used   Substance and Sexual Activity    Alcohol use: Not Currently    Drug use: No    Sexual activity: Yes     Partners: Female     Comment: Wife   Other Topics Concern     Service Not Asked    Blood Transfusions Not Asked    Caffeine Concern No     Comment: coffee 2 cups    Occupational Exposure Not Asked    Hobby Hazards Not Asked    Sleep Concern Not Asked    Stress Concern Not Asked    Weight Concern Not Asked    Special Diet Not Asked    Back Care Not Asked    Exercise No     Comment: yardwork    Bike Helmet Not Asked    Seat Belt Not Asked    Self-Exams  Not Asked   Social History Narrative    Relationships: Wife- Brandon    Children: Caity Lennon - adults    Pets: Dog- Becca    School: N/A    Work: Working part time -  at [employer]. Previously working for [employer].     Origin: Originally from [place].     Interests: Enjoys gardening. Spending time with grandchildren. Enjoys cooking. [employer] fan.     Spiritual: Believes in God.      Social Determinants of Health     Financial Resource Strain: Not on file   Food Insecurity: Not on file   Transportation Needs: Not on file   Physical Activity: Not on file   Stress: Not on file   Social Connections: Not on file   Housing Stability: Not on file       PAST SURGICAL HISTORY  Past Surgical History:   Procedure Laterality Date    Appendectomy  2012    Arthroscopy of joint unlisted      Colonoscopy  2003    Colonoscopy  12/18/2013    Colonoscopy N/A 07/10/2020    Procedure: COLONOSCOPY;  Surgeon: Lex Cruz MD;  Location: [facility]    Electrocardiogram, complete  11/28/2012    scanned to media tab    Hip replacement surgery Left 07/09/2021    Knee surgery Left     Meniscus    Tonsillectomy         PAST FAMILY HISTORY  Family History   Problem Relation Age of Onset    Heart Disease Mother     Diabetes Mother     Other (Other) Father         Killed in steel mill accident    Ovarian Cancer Sister     Obesity Sister     No Known Problems Sister     No Known Problems Brother     Cancer Brother         Gallbladder    Heart Disease Brother     Heart Disease Brother     No Known Problems Brother     Arthritis Brother     No Known Problems Brother     No Known Problems Brother     No Known Problems Maternal Grandmother     No Known Problems Maternal Grandfather     No Known Problems Paternal Grandmother     No Known Problems Paternal Grandfather     Breast Cancer Cousin     Colon Cancer Neg     Prostate Cancer Neg          PHYSICAL EXAM  Vitals:    08/07/24 0757   BP: 128/76   Pulse: 69    Temp: 98.2 °F (36.8 °C)   SpO2: 98%   Weight: 223 lb (101.2 kg)      Body mass index is 35.99 kg/m².    GENERAL: NAD  RESP: Non-labored respirations, CTAB, no wheezing, no rales, no ronchi  CV: RRR, no murmurs  MSK: No edema  SKIN: Warm and dry, no rashes  NEURO: Answering questions appropriately    LABS  Lab Results   Component Value Date    WBC 6.7 11/21/2023    HGB 13.6 11/21/2023    HCT 40.4 11/21/2023    .0 11/21/2023    NEPERCENT 59.0 11/21/2023    LYPERCENT 29.5 11/21/2023    MOPERCENT 7.8 11/21/2023    EOPERCENT 3.0 11/21/2023    BAPERCENT 0.4 11/21/2023    NE 3.93 11/21/2023    LYMABS 1.97 11/21/2023    MOABSO 0.52 11/21/2023    EOABSO 0.20 11/21/2023    BAABSO 0.03 11/21/2023       Lab Results   Component Value Date     02/26/2024    K 4.1 02/26/2024     02/26/2024    CO2 24.0 02/26/2024    ANIONGAP 10 02/26/2024    BUN 10 02/26/2024    CREATSERUM 1.09 02/26/2024    BUNCREA 9.2 (L) 02/26/2024     (H) 02/26/2024    CA 10.3 02/26/2024    OSMOCALC 290 02/26/2024    GFRNAA 67 11/30/2021    GFRAA 77 11/30/2021    ALT 23 02/26/2024    AST 24 02/26/2024    ALKPHO 75 02/26/2024    BILT 0.4 02/26/2024    TP 8.3 (H) 02/26/2024    ALB 5.1 (H) 02/26/2024    GLOBULIN 3.2 02/26/2024    ELECTAG 1.6 02/26/2024    FASTING Yes 02/26/2024    FASTING Yes 02/26/2024         Lab Results   Component Value Date    CHOLEST 135 02/26/2024    TRIG 84 02/26/2024    HDL 45 02/26/2024    LDL 74 02/26/2024    VLDL 13 02/26/2024    TCHDLRATIO 4.0 09/28/2015    NONHDLC 90 02/26/2024    CALCNONHDL 118 05/21/2013        DIAGNOSTICS      ASSESSMENT/PLAN  Problem List Items Addressed This Visit          HCC Problems    Class 2 severe obesity due to excess calories with serious comorbidity and body mass index (BMI) of 35.0 to 35.9 in adult (HCC)     Continue to focus on diet and exercise  Continue Ozempic         Relevant Medications    semaglutide (OZEMPIC, 0.25 OR 0.5 MG/DOSE,) 2 MG/3ML Subcutaneous Solution  Pen-injector    Diabetes mellitus type 2 without retinopathy (HCC)     Following with ophthalmology.         Relevant Medications    semaglutide (OZEMPIC, 0.25 OR 0.5 MG/DOSE,) 2 MG/3ML Subcutaneous Solution Pen-injector    Other Relevant Orders    POC Glycohemoglobin [05757]    Hep B, Adult [67323]    Type 2 diabetes mellitus without complication, without long-term current use of insulin (HCC) - Primary     Diabetes now controlled.   Goal A1c less than 7  Continue dapagliflozin 10 mg daily and metformin ER 1000 mg twice daily.  Increase semaglutide to 0.5 mg weekly         Relevant Medications    semaglutide (OZEMPIC, 0.25 OR 0.5 MG/DOSE,) 2 MG/3ML Subcutaneous Solution Pen-injector    Other Relevant Orders    POC Glycohemoglobin [43595]    Hep B, Adult [27869]       Cardiac and Vasculature    Essential hypertension     Patient with a history of coronary artery disease as well as hypertension, hypercholesterolemia  Blood pressures are controlled at this time.  On aspirin 81 mg daily  Currently on losartan hydrochlorothiazide 100-12.5 mg daily, amlodipine 10 mg daily, metoprolol succinate 100 mg daily  Also on atorvastatin 20 mg nightly.         Hypercholesterolemia     Patient with a history of coronary artery disease as well as hypertension, hypercholesterolemia  Blood pressures are controlled at this time.  On aspirin 81 mg daily  Currently on losartan hydrochlorothiazide 100-12.5 mg daily, amlodipine 10 mg daily, metoprolol succinate 100 mg daily  Also on atorvastatin 20 mg nightly.         Relevant Medications    semaglutide (OZEMPIC, 0.25 OR 0.5 MG/DOSE,) 2 MG/3ML Subcutaneous Solution Pen-injector       Advance Directives and General Issues    End of life care     Patient with advanced directive forms completed  Desires power of  to be Mariancuca Floyd (wife) followed by Caity Floyd   Quality of life is most important to him.             Health Encounters    Health maintenance  examination    Relevant Orders    ZOSTER VACC RECOMBINANT IM NJX       Return in about 6 months (around 2025) for physical.    No topic due editable text     Kevin Rey MD  Family Medicine           Pre-chartin minutes  Reviewing/obtainin minutes  Medical Exam:1 minutes  Counseling/education: 5 minutes  Notes: 5 minutes  Referring/communicatin minutes  Care coordination: 0 minutes    My total time spent caring for the patient on the day of the encounter: 18 minutes

## 2024-08-07 NOTE — ASSESSMENT & PLAN NOTE
Diabetes now controlled.   Goal A1c less than 7  Continue dapagliflozin 10 mg daily and metformin ER 1000 mg twice daily.  Increase semaglutide to 0.5 mg weekly

## 2024-08-07 NOTE — ASSESSMENT & PLAN NOTE
Patient with advanced directive forms completed  Desires power of  to be Judy Floyd (wife) followed by Caity Floyd   Quality of life is most important to him.

## 2024-08-07 NOTE — PATIENT INSTRUCTIONS
PATIENT INSTRUCTIONS    Thank you for seeing me today, it was a pleasure taking care of you.  Please check out at the  and schedule a follow up appointment.  Return in about 6 months (around 2/7/2025) for physical.  Please remember that the keyanna period for all appointments is 5 minutes. This is to help maximize the amount of time that we can spend together at our visits.    Increase ozempic to 0.5 mg weekly  Continue all other medications       Best,  Dr. Rey

## 2024-08-08 ENCOUNTER — OFFICE VISIT (OUTPATIENT)
Dept: OPHTHALMOLOGY | Facility: CLINIC | Age: 69
End: 2024-08-08
Payer: COMMERCIAL

## 2024-08-08 DIAGNOSIS — H40.1132 PRIMARY OPEN ANGLE GLAUCOMA (POAG) OF BOTH EYES, MODERATE STAGE: Primary | ICD-10-CM

## 2024-08-08 PROCEDURE — 99213 OFFICE O/P EST LOW 20 MIN: CPT | Performed by: OPHTHALMOLOGY

## 2024-08-08 NOTE — PROGRESS NOTES
Cristobal Floyd is a 69 year old male.    HPI:     HPI    Patient is here for an IOP check.  He is taking Latanoprost in both eyes at bedtime.  He feels his vision is good.    Last edited by Anusha Dunlap OT on 8/8/2024  9:14 AM.        Patient History:  Past Medical History:    Age-related nuclear cataract of both eyes    Amblyopia-left eye    Bulging lumbar disc    End of life care    Patient with advanced directive forms completed  Desires power of  to be Judy Floyd (wife) followed by Caity Floyd   Quality of life is most important to him.       Essential hypertension    Floater, vitreous    GERD (gastroesophageal reflux disease)    Glaucoma    High myopia    Hypercholesterolemia    Knee injury    Lichen planus    Meralgia paresthetica of left side    Primary open angle glaucoma (POAG) of both eyes, moderate stage    Start Latanoprost qhs OU    Primary osteoarthritis of left hip    Type 2 diabetes mellitus with diabetic polyneuropathy, without long-term current use of insulin ([facility])       Surgical History: Cristobal Floyd has a past surgical history that includes electrocardiogram, complete (11/28/2012) (scanned to media tab); appendectomy (2012); knee surgery (Left) (Meniscus); colonoscopy (2003); colonoscopy (12/18/2013); colonoscopy (N/A, 07/10/2020) (Procedure: COLONOSCOPY;  Surgeon: Lex Cruz MD;  Location: [facility] ENDOSCOPY); arthroscopy of joint unlisted; tonsillectomy; and hip replacement surgery (Left, 07/09/2021).    Family History   Problem Relation Age of Onset    Heart Disease Mother     Diabetes Mother     Other (Other) Father         Killed in steel mill accident    Ovarian Cancer Sister     Obesity Sister     No Known Problems Sister     No Known Problems Brother     Cancer Brother         Gallbladder    Heart Disease Brother     Heart Disease Brother     No Known Problems Brother     Arthritis Brother     No Known Problems Brother     No Known  Problems Brother     No Known Problems Maternal Grandmother     No Known Problems Maternal Grandfather     No Known Problems Paternal Grandmother     No Known Problems Paternal Grandfather     Breast Cancer Cousin     Colon Cancer Neg     Prostate Cancer Neg        Social History:   Social History     Socioeconomic History    Marital status:    Tobacco Use    Smoking status: Former     Current packs/day: 0.00     Average packs/day: 1 pack/day for 45.0 years (45.0 ttl pk-yrs)     Types: Cigarettes     Start date: 5/3/2019     Quit date: 2019     Years since quittin.2    Smokeless tobacco: Never   Vaping Use    Vaping status: Never Used   Substance and Sexual Activity    Alcohol use: Not Currently    Drug use: No    Sexual activity: Yes     Partners: Female     Comment: Wife   Other Topics Concern    Caffeine Concern No     Comment: coffee 2 cups    Exercise No     Comment: yardwork   Social History Narrative    Relationships: Wife- Brandon    Children: Caity Lennon - adults    Pets: Dog- Becca    School: N/A    Work: Working part time -  at [employer]. 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     Origin: Originally from [place].     Interests: Enjoys gardening. Spending time with grandchildren. Enjoys cooking. [employer] fan.     Spiritual: Believes in God.        Medications:  Current Outpatient Medications   Medication Sig Dispense Refill    semaglutide (OZEMPIC, 0.25 OR 0.5 MG/DOSE,) 2 MG/3ML Subcutaneous Solution Pen-injector Inject 0.5 mg into the skin once a week. 12 each 3    gabapentin 300 MG Oral Cap Take 1 capsule (300 mg total) by mouth 3 (three) times daily as needed. 270 capsule 0    amLODIPine 10 MG Oral Tab Take 1 tablet (10 mg total) by mouth daily. 90 tablet 3    Accu-Chek FastClix Lancets Does not apply Misc To monitor blood sugar three times daily 300 each 3    atorvastatin 20 MG Oral Tab Take 1 tablet (20 mg total) by mouth daily. 90 tablet 3    dapagliflozin  (FARXIGA) 10 MG Oral Tab Take 1 tablet (10 mg total) by mouth daily. 90 tablet 1    omeprazole 20 MG Oral Capsule Delayed Release TAKE 1 CAPSULE BY MOUTH EVERY DAY IN THE MORNING 90 capsule 3    triamcinolone 0.1 % External Ointment Apply 1 Application topically 2 (two) times daily. APPLY TO AFFECTED AREA 80 g 1    metFORMIN  MG Oral Tablet 24 Hr Take 4 tablets (2,000 mg total) by mouth daily with breakfast. 360 tablet 3    LATANOPROST 0.005 % Ophthalmic Solution USE 1 DROP IN BOTH EYES NIGHTLY 3 each 3    Losartan Potassium-HCTZ 100-12.5 MG Oral Tab Take 1 tablet by mouth daily. 90 tablet 1    metoprolol succinate  MG Oral Tablet 24 Hr Take 1 tablet (100 mg total) by mouth every evening. 90 tablet 0    clobetasol 0.05 % External Ointment Apply 1 Application  topically daily as needed. Apply to both arms and foot 2x/day as needed 120 g 1    Glucose Blood (ACCU-CHEK GUIDE) In Vitro Strip To monitor blood sugar three times daily 300 strip 3    aspirin 81 MG Oral Tab EC Take 1 tablet (81 mg total) by mouth daily.         Allergies:  Allergies   Allergen Reactions    Cymbalta [Duloxetine Hcl] RASH       ROS:       PHYSICAL EXAM:     Base Eye Exam       Visual Acuity (Snellen - Linear)         Right Left    Dist cc 20/40 -2 20/50 -2    Dist ph cc NI 20/40      Correction: Glasses              Tonometry (Applanation, 9:21 AM)         Right Left    Pressure 16 16              Pachymetry (6/12/2019)         Right Left    Thickness 564/-1 571/-2              Neuro/Psych       Oriented x3: Yes                  Slit Lamp and Fundus Exam       Slit Lamp Exam         Right Left    Lids/Lashes Dermatochalasis, Meibomian gland dysfunction Dermatochalasis, Meibomian gland dysfunction    Conjunctiva/Sclera ocular melanosis 360 ocular melanosis 360     Cornea clear- no KP, 2+ arcus clear- no KP, 2+ arcus    Anterior Chamber Deep and quiet Deep and quiet    Iris No transillumination defects No transillumination defects     Lens + pigment on anterior capsule , 1+ Nuclear sclerosis + pigment on anterior capsule , 1+ Nuclear sclerosis, Trace Cortical cataract infeiorly              Fundus Exam         Right Left    Disc Sloping margin, Peripapillary atrophy Sloping margin, Peripapillary atrophy    C/D Ratio 0.7 0.6                  Refraction       Wearing Rx         Sphere Cylinder Axis Add    Right -10.50 +2.50 170 +2.75    Left -12.75 +1.75 030 +2.75      Type: Progressive bifocal                     ASSESSMENT/PLAN:     Diagnoses and Plan:     Primary open angle glaucoma (POAG) of both eyes, moderate stage  Intraocular pressure stable, tolerating medications.    Will continue same regimen of Latanoprost at bedtime in both eyes.    Will have patient back for next available visual field and OCT with no MD, then 4 months for a diabetic exam     Orders Placed This Encounter   Procedures    June Visual Field - OU - Both Eyes    OCT, Optic Nerve - OU - Both Eyes       Meds This Visit:  Requested Prescriptions      No prescriptions requested or ordered in this encounter        Follow up instructions:  Return for Next Avail VF, OCT with no MD then 4 months for a diabetic exam.    8/8/2024  Scribed by: Ja Contreras MD

## 2024-08-08 NOTE — ASSESSMENT & PLAN NOTE
Intraocular pressure stable, tolerating medications.    Will continue same regimen of Latanoprost at bedtime in both eyes.    Will have patient back for next available visual field and OCT with no MD, then 4 months for a diabetic exam

## 2024-08-08 NOTE — PATIENT INSTRUCTIONS
Primary open angle glaucoma (POAG) of both eyes, moderate stage  Intraocular pressure stable, tolerating medications.    Will continue same regimen of Latanoprost at bedtime in both eyes.    Will have patient back for next available visual field and OCT with no MD, then 4 months for a diabetic exam

## 2024-08-13 ENCOUNTER — NURSE ONLY (OUTPATIENT)
Dept: OPHTHALMOLOGY | Facility: CLINIC | Age: 69
End: 2024-08-13
Payer: COMMERCIAL

## 2024-08-13 DIAGNOSIS — H40.1132 PRIMARY OPEN ANGLE GLAUCOMA (POAG) OF BOTH EYES, MODERATE STAGE: ICD-10-CM

## 2024-08-13 PROCEDURE — 92133 CPTRZD OPH DX IMG PST SGM ON: CPT | Performed by: OPHTHALMOLOGY

## 2024-08-13 PROCEDURE — 92083 EXTENDED VISUAL FIELD XM: CPT | Performed by: OPHTHALMOLOGY

## 2024-08-21 NOTE — PROGRESS NOTES
Cristobal Floyd is a 69 year old male.    HPI:     HPI    Patient is here for a VF and OCT with no MD.    Last edited by Anusha Dunlap OT on 8/13/2024 10:08 AM.        Patient History:  Past Medical History:    Age-related nuclear cataract of both eyes    Amblyopia-left eye    Bulging lumbar disc    End of life care    Patient with advanced directive forms completed  Desires power of  to be Judy Floyd (wife) followed by Caity Izabel Floyd   Quality of life is most important to him.       Essential hypertension    Floater, vitreous    GERD (gastroesophageal reflux disease)    Glaucoma    High myopia    Hypercholesterolemia    Knee injury    Lichen planus    Meralgia paresthetica of left side    Primary open angle glaucoma (POAG) of both eyes, moderate stage    Start Latanoprost qhs OU    Primary osteoarthritis of left hip    Type 2 diabetes mellitus with diabetic polyneuropathy, without long-term current use of insulin ([facility])       Surgical History: Cristobal Floyd has a past surgical history that includes electrocardiogram, complete (11/28/2012) (scanned to media tab); appendectomy (2012); knee surgery (Left) (Meniscus); colonoscopy (2003); colonoscopy (12/18/2013); colonoscopy (N/A, 07/10/2020) (Procedure: COLONOSCOPY;  Surgeon: Lex Cruz MD;  Location: [facility] ENDOSCOPY); arthroscopy of joint unlisted; tonsillectomy; and hip replacement surgery (Left, 07/09/2021).    Family History   Problem Relation Age of Onset    Heart Disease Mother     Diabetes Mother     Other (Other) Father         Killed in steel mill accident    Ovarian Cancer Sister     Obesity Sister     No Known Problems Sister     No Known Problems Brother     Cancer Brother         Gallbladder    Heart Disease Brother     Heart Disease Brother     No Known Problems Brother     Arthritis Brother     No Known Problems Brother     No Known Problems Brother     No Known Problems Maternal Grandmother     No Known  Problems Maternal Grandfather     No Known Problems Paternal Grandmother     No Known Problems Paternal Grandfather     Breast Cancer Cousin     Colon Cancer Neg     Prostate Cancer Neg        Social History:   Social History     Socioeconomic History    Marital status:    Tobacco Use    Smoking status: Former     Current packs/day: 0.00     Average packs/day: 1 pack/day for 45.0 years (45.0 ttl pk-yrs)     Types: Cigarettes     Start date: 5/3/2019     Quit date: 2019     Years since quittin.3    Smokeless tobacco: Never   Vaping Use    Vaping status: Never Used   Substance and Sexual Activity    Alcohol use: Not Currently    Drug use: No    Sexual activity: Yes     Partners: Female     Comment: Wife   Other Topics Concern    Caffeine Concern No     Comment: coffee 2 cups    Exercise No     Comment: yardwork   Social History Narrative    Relationships: Wife- Brandon    Children: Caity Lennon - adults    Pets: Dog- Becca    School: N/A    Work: Working part time -  at [employer]. Previously working for [employer].     Origin: Originally from [place].     Interests: Enjoys gardening. Spending time with grandchildren. Enjoys cooking. [employer] fan.     Spiritual: Believes in God.        Medications:  Current Outpatient Medications   Medication Sig Dispense Refill    semaglutide (OZEMPIC, 0.25 OR 0.5 MG/DOSE,) 2 MG/3ML Subcutaneous Solution Pen-injector Inject 0.5 mg into the skin once a week. 12 each 3    gabapentin 300 MG Oral Cap Take 1 capsule (300 mg total) by mouth 3 (three) times daily as needed. 270 capsule 0    amLODIPine 10 MG Oral Tab Take 1 tablet (10 mg total) by mouth daily. 90 tablet 3    Accu-Chek FastClix Lancets Does not apply Misc To monitor blood sugar three times daily 300 each 3    atorvastatin 20 MG Oral Tab Take 1 tablet (20 mg total) by mouth daily. 90 tablet 3    dapagliflozin (FARXIGA) 10 MG Oral Tab Take 1 tablet (10 mg total) by mouth daily. 90  tablet 1    omeprazole 20 MG Oral Capsule Delayed Release TAKE 1 CAPSULE BY MOUTH EVERY DAY IN THE MORNING 90 capsule 3    triamcinolone 0.1 % External Ointment Apply 1 Application topically 2 (two) times daily. APPLY TO AFFECTED AREA 80 g 1    metFORMIN  MG Oral Tablet 24 Hr Take 4 tablets (2,000 mg total) by mouth daily with breakfast. 360 tablet 3    LATANOPROST 0.005 % Ophthalmic Solution USE 1 DROP IN BOTH EYES NIGHTLY 3 each 3    Losartan Potassium-HCTZ 100-12.5 MG Oral Tab Take 1 tablet by mouth daily. 90 tablet 1    metoprolol succinate  MG Oral Tablet 24 Hr Take 1 tablet (100 mg total) by mouth every evening. 90 tablet 0    clobetasol 0.05 % External Ointment Apply 1 Application  topically daily as needed. Apply to both arms and foot 2x/day as needed 120 g 1    Glucose Blood (ACCU-CHEK GUIDE) In Vitro Strip To monitor blood sugar three times daily 300 strip 3    aspirin 81 MG Oral Tab EC Take 1 tablet (81 mg total) by mouth daily.         Allergies:  Allergies   Allergen Reactions    Cymbalta [Duloxetine Hcl] RASH       ROS:       PHYSICAL EXAM:   Not recorded          ASSESSMENT/PLAN:     Diagnoses and Plan:     Primary open angle glaucoma (POAG) of both eyes, moderate stage  Normal visual field, both eyes.  Abnormal OCT, both eyes.  Continue Latanoprost, 1 drop, both eyes, at bedtime.    No orders of the defined types were placed in this encounter.      Meds This Visit:  Requested Prescriptions      No prescriptions requested or ordered in this encounter        Follow up instructions:  Return in about 4 months at [facility] in [place] for Diabetic dilated eye exam.    8/21/2024  Scribed by: Ja Contreras MD

## 2024-09-21 DIAGNOSIS — I10 ESSENTIAL HYPERTENSION: ICD-10-CM

## 2024-09-23 RX ORDER — LOSARTAN POTASSIUM AND HYDROCHLOROTHIAZIDE 12.5; 1 MG/1; MG/1
1 TABLET ORAL DAILY
Qty: 90 TABLET | Refills: 1 | Status: SHIPPED | OUTPATIENT
Start: 2024-09-23

## 2024-10-14 RX ORDER — LANCETS
EACH MISCELLANEOUS
Qty: 300 EACH | Refills: 3 | Status: SHIPPED | OUTPATIENT
Start: 2024-10-14

## 2024-11-16 DIAGNOSIS — E11.42 TYPE 2 DIABETES MELLITUS WITH DIABETIC POLYNEUROPATHY, WITHOUT LONG-TERM CURRENT USE OF INSULIN (HCC): ICD-10-CM

## 2024-11-18 RX ORDER — DAPAGLIFLOZIN 10 MG/1
10 TABLET, FILM COATED ORAL DAILY
Qty: 90 TABLET | Refills: 1 | Status: SHIPPED | OUTPATIENT
Start: 2024-11-18

## 2024-12-28 DIAGNOSIS — I10 ESSENTIAL HYPERTENSION: ICD-10-CM

## 2024-12-30 RX ORDER — METOPROLOL SUCCINATE 100 MG/1
100 TABLET, EXTENDED RELEASE ORAL EVERY EVENING
Qty: 90 TABLET | Refills: 0 | Status: SHIPPED | OUTPATIENT
Start: 2024-12-30

## 2025-01-01 RX ORDER — CLOBETASOL PROPIONATE 0.5 MG/G
1 OINTMENT TOPICAL DAILY PRN
Qty: 120 G | Refills: 1 | OUTPATIENT
Start: 2025-01-01

## 2025-01-02 DIAGNOSIS — I10 ESSENTIAL HYPERTENSION: ICD-10-CM

## 2025-01-02 RX ORDER — LOSARTAN POTASSIUM AND HYDROCHLOROTHIAZIDE 12.5; 1 MG/1; MG/1
1 TABLET ORAL DAILY
Qty: 90 TABLET | Refills: 1 | Status: SHIPPED | OUTPATIENT
Start: 2025-01-02

## 2025-01-23 DIAGNOSIS — E11.42 CONTROLLED TYPE 2 DIABETES MELLITUS WITH DIABETIC POLYNEUROPATHY, WITHOUT LONG-TERM CURRENT USE OF INSULIN (HCC): ICD-10-CM

## 2025-01-23 RX ORDER — METFORMIN HYDROCHLORIDE 500 MG/1
2000 TABLET, EXTENDED RELEASE ORAL
Qty: 360 TABLET | Refills: 0 | Status: SHIPPED | OUTPATIENT
Start: 2025-01-23

## 2025-02-03 ENCOUNTER — OFFICE VISIT (OUTPATIENT)
Dept: INTERNAL MEDICINE CLINIC | Facility: CLINIC | Age: 70
End: 2025-02-03
Payer: COMMERCIAL

## 2025-02-03 VITALS
HEART RATE: 86 BPM | SYSTOLIC BLOOD PRESSURE: 126 MMHG | BODY MASS INDEX: 35.03 KG/M2 | OXYGEN SATURATION: 97 % | DIASTOLIC BLOOD PRESSURE: 88 MMHG | WEIGHT: 218 LBS | HEIGHT: 66 IN

## 2025-02-03 DIAGNOSIS — H52.13 HIGH MYOPIA, BILATERAL: ICD-10-CM

## 2025-02-03 DIAGNOSIS — S92.001D CLOSED NONDISPLACED FRACTURE OF RIGHT CALCANEUS WITH ROUTINE HEALING, UNSPECIFIED PORTION OF CALCANEUS, SUBSEQUENT ENCOUNTER: ICD-10-CM

## 2025-02-03 DIAGNOSIS — H25.13 AGE-RELATED NUCLEAR CATARACT OF BOTH EYES: ICD-10-CM

## 2025-02-03 DIAGNOSIS — I10 ESSENTIAL HYPERTENSION: ICD-10-CM

## 2025-02-03 DIAGNOSIS — E66.01 CLASS 2 SEVERE OBESITY DUE TO EXCESS CALORIES WITH SERIOUS COMORBIDITY AND BODY MASS INDEX (BMI) OF 35.0 TO 35.9 IN ADULT (HCC): ICD-10-CM

## 2025-02-03 DIAGNOSIS — H40.1132 PRIMARY OPEN ANGLE GLAUCOMA (POAG) OF BOTH EYES, MODERATE STAGE: ICD-10-CM

## 2025-02-03 DIAGNOSIS — E66.812 CLASS 2 SEVERE OBESITY DUE TO EXCESS CALORIES WITH SERIOUS COMORBIDITY AND BODY MASS INDEX (BMI) OF 35.0 TO 35.9 IN ADULT (HCC): ICD-10-CM

## 2025-02-03 DIAGNOSIS — E11.9 TYPE 2 DIABETES MELLITUS WITHOUT COMPLICATION, WITHOUT LONG-TERM CURRENT USE OF INSULIN (HCC): Primary | ICD-10-CM

## 2025-02-03 DIAGNOSIS — E11.9 DIABETES MELLITUS TYPE 2 WITHOUT RETINOPATHY (HCC): ICD-10-CM

## 2025-02-03 DIAGNOSIS — E78.00 HYPERCHOLESTEROLEMIA: ICD-10-CM

## 2025-02-03 DIAGNOSIS — M72.2 PLANTAR FASCIITIS: ICD-10-CM

## 2025-02-03 DIAGNOSIS — Z00.00 HEALTH MAINTENANCE EXAMINATION: ICD-10-CM

## 2025-02-03 PROBLEM — S92.009A CALCANEAL FRACTURE: Status: RESOLVED | Noted: 2024-01-04 | Resolved: 2025-02-03

## 2025-02-03 PROBLEM — H43.393 FLOATER, VITREOUS, BILATERAL: Status: RESOLVED | Noted: 2018-02-07 | Resolved: 2025-02-03

## 2025-02-03 LAB
CREAT UR-SCNC: 204.4 MG/DL
HEMOGLOBIN A1C: 5.9 % (ref 4.3–5.6)
MICROALBUMIN UR-MCNC: 0.3 MG/DL
MICROALBUMIN/CREAT 24H UR-RTO: 1.5 UG/MG (ref ?–30)

## 2025-02-03 PROCEDURE — 82043 UR ALBUMIN QUANTITATIVE: CPT | Performed by: FAMILY MEDICINE

## 2025-02-03 PROCEDURE — 82570 ASSAY OF URINE CREATININE: CPT | Performed by: FAMILY MEDICINE

## 2025-02-03 RX ORDER — METFORMIN HYDROCHLORIDE 500 MG/1
2000 TABLET, EXTENDED RELEASE ORAL
Qty: 360 TABLET | Refills: 1 | Status: SHIPPED | OUTPATIENT
Start: 2025-02-03

## 2025-02-03 NOTE — PROGRESS NOTES
FAMILY MEDICINE CLINIC NOTE    HPI  Cristobal Floyd is a 69 year old male presenting for       #DM  -Hba1c: 11.5 11/21/23 ,2/2024 8.1, 8/7/24 6.1, 2/2025 5.9  -Microalbuminuria: Indicated  -B12: 11/2023  -Pneumonia vaccine:Prevnar 20 12/2023  -HepB: Indicated vaccine  -Eye exam: Dr Contreras. Going to see Dr Elroy Blanc moving forward   -Diabetic foot exam: 2/3/25 Bilateral barefoot skin diabetic exam is normal, visualized feet and the appearance is normal. Bilateral monofilament/sensation of both feet is normal. Pulsation pedal pulse exam of both lower legs/feet is normal as well.  -Current medications: dapagliflozin 10 mg daily, metformin ER 1000mg twice daily , ozempic 0.5 mg weekly. Slight nausea the day after using the ozempic.   -Blood sugars:  -AM:      -Noon:   -PM:      -hypoglycemia:      #CAD  #HLD  #HTN  -cardiac cath 2012, being managed medically  -no CP, no SOB  -aspirin 81 mg daily   -losartan hydrochlorothiazide 100-12.5 mg daily   -amlodipine 10 mg daily   -metoprolol succinate 100 mg daily   -atorvastatin 20 mg nightly         #Calcaneal fracture ---resolved  #Plantar fasciitis  -Xray foot 7/2023 - 10-mm cortical deformity along the plantar aspect the anterior-lateral calcaneal body.  The appearance suggests cortical fracture/avulsion.  5 mm linear structure, most likely representing cortical bone is within the soft tissues at the site of cortical deformity, with differential including form body.  Correlate with mechanism of injury.   -ortho Dr Apodaca  -podiatry Dr Ludwig Wasserman   -no further issues with fracture    #Glaucoma   #Cataracts  #Myopia   -ophthalmology Dr Contreras. Going to see Dr Elroy Blanc.   -lataonoprost solution       ---other          #Lumbar spondylosis  #Lumbar bulging disc   #Neuropathy  -MRI 4/2017 - CONCLUSION: Disease and osteoarthritis within the lumbar spine as above, causing central canal and neuroforaminal narrowing.  Central canal narrowing is most advanced at  L3-4.  Neuroforaminal narrowing is most advanced at L4-5.   -history of sciatica   -has had facet injection  -historically radiating from lower back down to left leg   -gabapentin 300 mg three times daily  -controlled   -overall back pain is well controlled without significant issues at this time   -physiatry Dr Hernandez Sanabria        #GERD  -omeprazole 20 mg daily  -no issues       #Lichen planus  -tacrolimus9,1% ointment   -clobetasol 0.0.5% ointment  -triamcinolone 0.1%  -dermatology Clive Shannon      #Smoking history  -quit in 2019  -needs repeat CT lung screen in 4/2025    #End of life cares  -POA is Judy Floyd (wife) followed by Caity Floyd   -quality of life is imost important to him         ROS  GENERAL: No fever/chills, no recent weight loss  HEENT: No visual changes, no changes in hearing, no sore throats  NECK: No pain, no swelling  RESP: No cough, no SOB  CV: No chest pain, no palpitations  GI: No abd pain, no N/V/D  MSK: No edema  SKIN: No new rashes  NEURO: No numbness, no tingling, no headaches    HEALTH MAINTENANCE  Health Maintenance Topics with due status: Overdue       Topic Date Due    COVID-19 Vaccine 09/01/2024    Influenza Vaccine 10/01/2024    Zoster Vaccines 10/02/2024    Diabetes Care Dilated Eye Exam 11/02/2024    PSA 12/01/2024    Annual Depression Screening 01/01/2025    Fall Risk Screening (Annual) 01/01/2025    Diabetes Care: Foot Exam (Annual) 01/01/2025    Diabetes Care: Microalb/Creat Ratio (Annual) 01/01/2025     Health Maintenance Topics with due status: Due Soon       Topic Date Due    Diabetes Care A1C 02/07/2025    Annual Physical 02/26/2025       ALLERGIES  Allergies[1]    MEDICATIONS  Current Outpatient Medications   Medication Sig Dispense Refill    metFORMIN  MG Oral Tablet 24 Hr Take 4 tablets (2,000 mg total) by mouth daily with breakfast. 360 tablet 1    LOSARTAN POTASSIUM-HCTZ 100-12.5 MG Oral Tab TAKE 1 TABLET BY MOUTH EVERY DAY 90  tablet 1    METOPROLOL SUCCINATE  MG Oral Tablet 24 Hr TAKE 1 TABLET BY MOUTH EVERY EVENING. 90 tablet 0    FARXIGA 10 MG Oral Tab TAKE 1 TABLET BY MOUTH EVERY DAY 90 tablet 1    Accu-Chek FastClix Lancets Does not apply Misc To monitor blood sugar three times daily 300 each 3    semaglutide (OZEMPIC, 0.25 OR 0.5 MG/DOSE,) 2 MG/3ML Subcutaneous Solution Pen-injector Inject 0.5 mg into the skin once a week. 12 each 3    gabapentin 300 MG Oral Cap Take 1 capsule (300 mg total) by mouth 3 (three) times daily as needed. 270 capsule 0    amLODIPine 10 MG Oral Tab Take 1 tablet (10 mg total) by mouth daily. 90 tablet 3    atorvastatin 20 MG Oral Tab Take 1 tablet (20 mg total) by mouth daily. 90 tablet 3    omeprazole 20 MG Oral Capsule Delayed Release TAKE 1 CAPSULE BY MOUTH EVERY DAY IN THE MORNING 90 capsule 3    triamcinolone 0.1 % External Ointment Apply 1 Application topically 2 (two) times daily. APPLY TO AFFECTED AREA 80 g 1    LATANOPROST 0.005 % Ophthalmic Solution USE 1 DROP IN BOTH EYES NIGHTLY 3 each 3    clobetasol 0.05 % External Ointment Apply 1 Application  topically daily as needed. Apply to both arms and foot 2x/day as needed 120 g 1    Glucose Blood (ACCU-CHEK GUIDE) In Vitro Strip To monitor blood sugar three times daily 300 strip 3    aspirin 81 MG Oral Tab EC Take 1 tablet (81 mg total) by mouth daily.         ACTIVE PROBLEMS  Patient Active Problem List   Diagnosis    Atherosclerosis of native coronary artery of native heart without angina pectoris    GERD (gastroesophageal reflux disease)    Essential hypertension    Hypercholesterolemia    History of nicotine dependence    Age-related nuclear cataract of both eyes    High myopia, bilateral    Amblyopia, left    Type 2 diabetes mellitus without complication, without long-term current use of insulin (HCC)    Diabetes mellitus type 2 without retinopathy (HCC)    Lichen planus    Primary open angle glaucoma (POAG) of both eyes, moderate stage     Agatston coronary artery calcium score between 200 and 399    Lumbar spondylosis    Bulging lumbar disc    Health maintenance examination    Class 2 severe obesity due to excess calories with serious comorbidity and body mass index (BMI) of 35.0 to 35.9 in adult ([facility])    End of life care    Plantar fasciitis       PAST MEDICAL HISTORY  Past Medical History:    Age-related nuclear cataract of both eyes    Amblyopia-left eye    Bulging lumbar disc    End of life care    Patient with advanced directive forms completed  Desires power of  to be Judy Springer Everett (wife) followed by Caity Floyd   Quality of life is most important to him.       Essential hypertension    Floater, vitreous    GERD (gastroesophageal reflux disease)    Glaucoma    High myopia    Hypercholesterolemia    Knee injury    Lichen planus    Meralgia paresthetica of left side    Primary open angle glaucoma (POAG) of both eyes, moderate stage    Start Latanoprost qhs OU    Primary osteoarthritis of left hip    Type 2 diabetes mellitus with diabetic polyneuropathy, without long-term current use of insulin ([facility])       PAST SOCIAL HISTORY  Social History     Socioeconomic History    Marital status:      Spouse name: Not on file    Number of children: Not on file    Years of education: Not on file    Highest education level: Not on file   Occupational History    Not on file   Tobacco Use    Smoking status: Former     Current packs/day: 0.00     Average packs/day: 1 pack/day for 45.0 years (45.0 ttl pk-yrs)     Types: Cigarettes     Start date: 5/3/2019     Quit date: 2019     Years since quittin.7    Smokeless tobacco: Never   Vaping Use    Vaping status: Never Used   Substance and Sexual Activity    Alcohol use: Not Currently    Drug use: No    Sexual activity: Yes     Partners: Female     Comment: Wife   Other Topics Concern     Service Not Asked    Blood Transfusions Not Asked    Caffeine Concern  No     Comment: coffee 2 cups    Occupational Exposure Not Asked    Hobby Hazards Not Asked    Sleep Concern Not Asked    Stress Concern Not Asked    Weight Concern Not Asked    Special Diet Not Asked    Back Care Not Asked    Exercise No     Comment: yardwork    Bike Helmet Not Asked    Seat Belt Not Asked    Self-Exams Not Asked   Social History Narrative    Relationships: Wife- Brandon    Children: Caity Lennon - adults    Pets: Dog- Becca    School: N/A    Work: Working part time -  at [employer]. Previously working for [employer].     Origin: Originally from [place].     Interests: Enjoys gardening. Spending time with grandchildren. Enjoys cooking. [employer] fan.     Spiritual: Believes in God.      Social Drivers of Health     Financial Resource Strain: Not on file   Food Insecurity: Not on file   Transportation Needs: Not on file   Physical Activity: Not on file   Stress: Not on file   Social Connections: Not on file   Housing Stability: Not on file       PAST SURGICAL HISTORY  Past Surgical History:   Procedure Laterality Date    Appendectomy  2012    Arthroscopy of joint unlisted      Colonoscopy  2003    Colonoscopy  12/18/2013    Colonoscopy N/A 07/10/2020    Procedure: COLONOSCOPY;  Surgeon: Lex Cruz MD;  Location: [facility] ENDOSCOPY    Electrocardiogram, complete  11/28/2012    scanned to media tab    Hip replacement surgery Left 07/09/2021    Knee surgery Left     Meniscus    Tonsillectomy         PAST FAMILY HISTORY  Family History   Problem Relation Age of Onset    Heart Disease Mother     Diabetes Mother     Other (Other) Father         Killed in steel mill accident    Ovarian Cancer Sister     Obesity Sister     No Known Problems Sister     No Known Problems Brother     Cancer Brother         Gallbladder    Heart Disease Brother     Heart Disease Brother     No Known Problems Brother     Arthritis Brother     No Known Problems Brother     No Known Problems Brother      No Known Problems Maternal Grandmother     No Known Problems Maternal Grandfather     No Known Problems Paternal Grandmother     No Known Problems Paternal Grandfather     Breast Cancer Cousin     Colon Cancer Neg     Prostate Cancer Neg          PHYSICAL EXAM  Vitals:    02/03/25 1501   BP: 126/88   Pulse: 86   SpO2: 97%   Weight: 218 lb (98.9 kg)      Body mass index is 35.19 kg/m².    GENERAL: NAD  RESP: Non-labored respirations, CTAB, no wheezing, no rales, no rhonchi  CV: RRR, no murmurs  MSK: No edema. Bilateral barefoot skin diabetic exam is normal, visualized feet and the appearance is normal. Bilateral monofilament/sensation of both feet is normal. Pulsation pedal pulse exam of both lower legs/feet is normal as well.  SKIN: Warm and dry, no rashes  NEURO: Answering questions appropriately    LABS  Lab Results   Component Value Date    WBC 6.7 11/21/2023    HGB 13.6 11/21/2023    HCT 40.4 11/21/2023    .0 11/21/2023    NEPERCENT 59.0 11/21/2023    LYPERCENT 29.5 11/21/2023    MOPERCENT 7.8 11/21/2023    EOPERCENT 3.0 11/21/2023    BAPERCENT 0.4 11/21/2023    NE 3.93 11/21/2023    LYMABS 1.97 11/21/2023    MOABSO 0.52 11/21/2023    EOABSO 0.20 11/21/2023    BAABSO 0.03 11/21/2023       Lab Results   Component Value Date     02/26/2024    K 4.1 02/26/2024     02/26/2024    CO2 24.0 02/26/2024    ANIONGAP 10 02/26/2024    BUN 10 02/26/2024    CREATSERUM 1.09 02/26/2024    BUNCREA 9.2 (L) 02/26/2024     (H) 02/26/2024    CA 10.3 02/26/2024    OSMOCALC 290 02/26/2024    GFRNAA 67 11/30/2021    GFRAA 77 11/30/2021    ALT 23 02/26/2024    AST 24 02/26/2024    ALKPHO 75 02/26/2024    BILT 0.4 02/26/2024    TP 8.3 (H) 02/26/2024    ALB 5.1 (H) 02/26/2024    GLOBULIN 3.2 02/26/2024    ELECTAG 1.6 02/26/2024    FASTING Yes 02/26/2024    FASTING Yes 02/26/2024         Lab Results   Component Value Date    CHOLEST 135 02/26/2024    TRIG 84 02/26/2024    HDL 45 02/26/2024    LDL 74 02/26/2024     VLDL 13 02/26/2024    TCHDLRATIO 4.0 09/28/2015    NONHDLC 90 02/26/2024    CALCNONHDL 118 05/21/2013        DIAGNOSTICS      ASSESSMENT/PLAN  Problem List Items Addressed This Visit          HCC Problems    Class 2 severe obesity due to excess calories with serious comorbidity and body mass index (BMI) of 35.0 to 35.9 in adult (HCC)     Continue to focus on diet and exercise  Continue Ozempic         Relevant Medications    metFORMIN  MG Oral Tablet 24 Hr    Diabetes mellitus type 2 without retinopathy (HCC)     Needs to see a new opthalmologist         Relevant Medications    metFORMIN  MG Oral Tablet 24 Hr    Other Relevant Orders    OPHTHALMOLOGY - EXTERNAL    Type 2 diabetes mellitus without complication, without long-term current use of insulin (HCC) - Primary     Diabetes controlled.   Goal A1c less than 7  Continue dapagliflozin 10 mg daily and metformin ER 1000 mg twice daily.  Continue semaglutide to 0.5 mg weekly  Mild nausea on semaglutide the day after, but overall tolerating.  Prefers to continue the semaglutide.  Urine microalbumin.  Follow up in 6 months.         Relevant Medications    metFORMIN  MG Oral Tablet 24 Hr    Other Relevant Orders    POC Glycohemoglobin [[id number]] (Completed)    Microalb/Creat Ratio, Random Urine       Cardiac and Vasculature    Essential hypertension     Patient with a history of coronary artery disease as well as hypertension, hypercholesterolemia  Blood pressures are controlled at this time.  On aspirin 81 mg daily  Currently on losartan hydrochlorothiazide 100-12.5 mg daily, amlodipine 10 mg daily, metoprolol succinate 100 mg daily  Also on atorvastatin 20 mg nightly.         Hypercholesterolemia     Patient with a history of coronary artery disease as well as hypertension, hypercholesterolemia  Blood pressures are controlled at this time.  On aspirin 81 mg daily  Currently on losartan hydrochlorothiazide 100-12.5 mg daily, amlodipine 10 mg daily,  metoprolol succinate 100 mg daily  Also on atorvastatin 20 mg nightly.         Relevant Medications    metFORMIN  MG Oral Tablet 24 Hr       Musculoskeletal and Injuries    RESOLVED: Calcaneal fracture     No further issues         Plantar fasciitis     Following with podiatry            Eye    Age-related nuclear cataract of both eyes     Continue to follow with ophthalmology.         High myopia, bilateral     Following with ophthalmology.         Primary open angle glaucoma (POAG) of both eyes, moderate stage     On latanoprost  Continue to follow with ophthalmology.            Health Encounters    Health maintenance examination    Relevant Orders    Hep B, Adult [[id number]]    ZOSTER VACC RECOMBINANT IM NJX       Return in about 6 months (around 8/3/2025) for physical.    No topic due editable text     Kevin Rey MD  Family Medicine           Pre-chartin minutes  Reviewing/obtainin minutes  Medical Exam:1 minutes  Counseling/education: 5 minutes  Notes: 5 minutes  Referring/communicatin minutes  Care coordination: 0 minutes    My total time spent caring for the patient on the day of the encounter: 18 minutes         [1]   Allergies  Allergen Reactions    Cymbalta [Duloxetine Hcl] RASH

## 2025-02-03 NOTE — ASSESSMENT & PLAN NOTE
Diabetes controlled.   Goal A1c less than 7  Continue dapagliflozin 10 mg daily and metformin ER 1000 mg twice daily.  Continue semaglutide to 0.5 mg weekly  Mild nausea on semaglutide the day after, but overall tolerating.  Prefers to continue the semaglutide.  Urine microalbumin.  Follow up in 6 months.

## 2025-02-03 NOTE — PATIENT INSTRUCTIONS
PATIENT INSTRUCTIONS    Thank you for seeing me today, it was a pleasure taking care of you.  Please check out at the  and schedule a follow up appointment.  Return in about 6 months (around 8/3/2025) for physical.  Please remember that the preferred keyanna period for appointments is 5 minutes. This is to help maximize the amount of time that we can spend together at our visits.    Have Dr Blanc fax over eye report   Please have the doctor fax his/her note and examination to our office at [phone].  Continue current medications   Focus on diet and exercise    Tr,  Dr. Rey

## 2025-03-11 ENCOUNTER — PATIENT MESSAGE (OUTPATIENT)
Dept: INTERNAL MEDICINE CLINIC | Facility: CLINIC | Age: 70
End: 2025-03-11

## 2025-03-11 RX ORDER — BLOOD SUGAR DIAGNOSTIC
STRIP MISCELLANEOUS
Qty: 270 STRIP | Refills: 0 | Status: SHIPPED | OUTPATIENT
Start: 2025-03-11

## 2025-03-26 DIAGNOSIS — I10 ESSENTIAL HYPERTENSION: ICD-10-CM

## 2025-03-26 RX ORDER — METOPROLOL SUCCINATE 100 MG/1
100 TABLET, EXTENDED RELEASE ORAL EVERY EVENING
Qty: 90 TABLET | Refills: 0 | Status: SHIPPED | OUTPATIENT
Start: 2025-03-26

## 2025-04-11 ENCOUNTER — ORDER TRANSCRIPTION (OUTPATIENT)
Dept: ADMINISTRATIVE | Facility: HOSPITAL | Age: 70
End: 2025-04-11

## 2025-04-11 DIAGNOSIS — G62.9 NEUROPATHY: ICD-10-CM

## 2025-04-11 DIAGNOSIS — G57.51 TARSAL TUNNEL SYNDROME OF RIGHT SIDE: Primary | ICD-10-CM

## 2025-05-01 ENCOUNTER — PROCEDURE VISIT (OUTPATIENT)
Dept: PHYSICAL MEDICINE AND REHAB | Facility: CLINIC | Age: 70
End: 2025-05-01
Payer: COMMERCIAL

## 2025-05-01 DIAGNOSIS — E11.42 DIABETIC POLYNEUROPATHY ASSOCIATED WITH TYPE 2 DIABETES MELLITUS (HCC): Primary | ICD-10-CM

## 2025-05-01 PROCEDURE — 95885 MUSC TST DONE W/NERV TST LIM: CPT | Performed by: PHYSICAL MEDICINE & REHABILITATION

## 2025-05-01 PROCEDURE — 95909 NRV CNDJ TST 5-6 STUDIES: CPT | Performed by: PHYSICAL MEDICINE & REHABILITATION

## 2025-05-01 PROCEDURE — 95886 MUSC TEST DONE W/N TEST COMP: CPT | Performed by: PHYSICAL MEDICINE & REHABILITATION

## 2025-05-01 NOTE — PROGRESS NOTES
[facility]  Electromyography Consultation      History of Present Illness:    Dear Ludwig Wasserman D.P.M.  Thank you for the opportunity to see Cristobal Mccloud Everett for electrodiagnostic consultation today. As you know the patient is a 69 year old male with a chief complaint of right foot numbness tingling and pain.  He has had diabetes since 2013, under good control recently.  He injured himself at [employer] several years ago while bending to adjust a carpet developing sharp foot pain across the instep.  He was found to have a fracture calcaneus.  He had persistent plantar pain, numbness and tingling and eventually underwent plantar fascia release surgically.  Unfortunately these interventions have not helped.  He was referred for an EMG looking for tarsal tunnel versus neuropathy.    PAST MEDICAL HISTORY:  Past Medical History[1]    SURGICAL HISTORY:  Past Surgical History[2]    SOCIAL HISTORY:   Social History     Occupational History    Not on file   Tobacco Use    Smoking status: Former     Current packs/day: 0.00     Average packs/day: 1 pack/day for 45.0 years (45.0 ttl pk-yrs)     Types: Cigarettes     Start date: 5/3/2019     Quit date: 2019     Years since quittin.0    Smokeless tobacco: Never   Vaping Use    Vaping status: Never Used   Substance and Sexual Activity    Alcohol use: Not Currently    Drug use: No    Sexual activity: Yes     Partners: Female     Comment: Wife       FAMILY HISTORY:   Family History[3]    CURRENT MEDICATIONS:   Current Medications[4]    PHYSICAL EXAM:   There were no vitals taken for this visit.    There is no height or weight on file to calculate BMI.      General: No immediate distress   Extremities: peripheral pulses intact, no lower extremity edema bilaterally   Skin: No lesions noted.   Neuro:   Strength: 4 extremities have 5/5 strength  Muscle bulk: No atrophy  Sensation: Normal lower extremities  Reflexes: Areflexic  lower extremities  SLR: Negative bilaterally      EMG/NCV  Sensory NCS      Nerve / Sites Distance Segments Peak Lat NP Amp    cm  ms µV   R SURAL - Antidr      Calf 14 Calf - Lat Mall NR NR      Ref.  Ref. 4.20 5.0   L SURAL - Antidr      Calf 14 Calf - Lat Mall NR NR      Ref.  Ref. 4.20 5.0   L MEDIAL PLANTAR      Sole 14 Sole - Med Mall NR NR      Ref.  Ref. 4.20    R MEDIAL PLANTAR      Sole 14 Sole - Med Mall NR NR      Ref.  Ref. 4.20        Motor NCS      Nerve / Sites Distance Segments Latency Amplitude Velocity Amp.1-2 Peak Dur. Area    cm  ms mV m/s % ms mVms   R COMM PERONEAL - EDB      Ankle 8 Ankle - EDB 4.75 5.9  100 5.30 15.2      Ref.  Ref. 6.00 2.0          Fib Head 33 Fib Head - Ankle 13.15 5.1 39.3 86 6.10 16.0      Ref.  Ref.   40.0         Knee 8 Knee - Fib Head 15.30 4.5 37.2 76.2 6.30 14.8   R TIBIAL (KNEE) - AH      Ankle 8 Ankle - AH 6.40 3.2  100 6.20 10.0      Ref.  Ref. 6.50 2.5          Knee 40 Knee - Ankle 16.95 2.8 37.9 86.8 6.90 9.7      Ref.  Ref.   40.0          EMG Summary Table     Spontaneous MUAP Recruitment    IA Fib PSW Fasc H.F. Amp Dur. PPP Pattern   R. TIB ANTERIOR 1+ None None None None N N N N   R. PERON LONGUS N 2+ None None None N N N N   R. GASTROCN (MED) 2+ None None None None N N N N   R. ABD  HALLUCIS N None 1+ None None N N N N   R. D INTEROSS (LL) N None 2+ None None N N N N   R. ABD DIG MIN (LL) N None 1+ None None N N N N   R. FLEX DIG MINIMI N None 2+ None None N N N N   L. D INTEROSS (LL) N None 2+ None None N N N N       Findings: Extremities were warmed with hot packs for 15 minutes prior to testing and skin temperature maintained above 32 C.  Sensory nerve conduction studies revealed bilaterally absent sural sensory and mixed nerve medial plantar responses.  Motor nerve conduction studies revealed normal latencies and amplitudes with slow conduction velocities bilaterally.  Needle EMG primarily of the right lower extremity revealed fibrillations in all  intrinsic foot musculature and the peroneus longus.  There is also increased insertional activity in the tibialis anterior and medial gastrocnemius.  Normal recruitment.  The left foot dorsal interosseous also had fibrillations.  Impression:  1.  Abnormal study.  2.  Electrodiagnostic evidence is consistent with peripheral polyneuropathy.  This characterized by distal sensorimotor axon loss and demyelination.  3.  No electrodiagnostic evidence of focal tibial neuropathy at the ankle.  4.  No electrodiagnostic evidence of right lumbar radiculopathy.      ASSESSMENT AND PLAN:  1. Diabetic polyneuropathy associated with type 2 diabetes mellitus (HCC)  The patient's EMG is consistent with diabetic polyneuropathy.        Thank you for the opportunity to participate in the care of this patient.  Sincerely,    Daniele Ng M.D.  Diplomate American Board of Physical Medicine and Rehabilitation         [1]   Past Medical History:   Age-related nuclear cataract of both eyes    Amblyopia-left eye    Bulging lumbar disc    End of life care    Patient with advanced directive forms completed  Desires power of  to be Judy Bradenney (wife) followed by Caity Floyd   Quality of life is most important to him.       Essential hypertension    Floater, vitreous    GERD (gastroesophageal reflux disease)    Glaucoma    High myopia    Hypercholesterolemia    Knee injury    Lichen planus    Meralgia paresthetica of left side    Primary open angle glaucoma (POAG) of both eyes, moderate stage    Start Latanoprost qhs OU    Primary osteoarthritis of left hip    Type 2 diabetes mellitus with diabetic polyneuropathy, without long-term current use of insulin (HCC)   [2]   Past Surgical History:  Procedure Laterality Date    Appendectomy  2012    Arthroscopy of joint unlisted      Colonoscopy  2003    Colonoscopy  12/18/2013    Colonoscopy N/A 07/10/2020    Procedure: COLONOSCOPY;  Surgeon: Lex Cruz,  MD;  Location: [facility] ENDOSCOPY    Electrocardiogram, complete  11/28/2012    scanned to media tab    Hip replacement surgery Left 07/09/2021    Knee surgery Left     Meniscus    Tonsillectomy     [3]   Family History  Problem Relation Age of Onset    Heart Disease Mother     Diabetes Mother     Other (Other) Father         Killed in steel mill accident    Ovarian Cancer Sister     Obesity Sister     No Known Problems Sister     No Known Problems Brother     Cancer Brother         Gallbladder    Heart Disease Brother     Heart Disease Brother     No Known Problems Brother     Arthritis Brother     No Known Problems Brother     No Known Problems Brother     No Known Problems Maternal Grandmother     No Known Problems Maternal Grandfather     No Known Problems Paternal Grandmother     No Known Problems Paternal Grandfather     Breast Cancer Cousin     Colon Cancer Neg     Prostate Cancer Neg    [4]   Current Outpatient Medications   Medication Sig Dispense Refill    METOPROLOL SUCCINATE  MG Oral Tablet 24 Hr TAKE 1 TABLET BY MOUTH EVERY EVENING. 90 tablet 0    Glucose Blood (ACCU-CHEK GUIDE TEST) In Vitro Strip to monitor blood sugar three times daily 270 strip 0    metFORMIN  MG Oral Tablet 24 Hr Take 4 tablets (2,000 mg total) by mouth daily with breakfast. 360 tablet 1    LOSARTAN POTASSIUM-HCTZ 100-12.5 MG Oral Tab TAKE 1 TABLET BY MOUTH EVERY DAY 90 tablet 1    FARXIGA 10 MG Oral Tab TAKE 1 TABLET BY MOUTH EVERY DAY 90 tablet 1    Accu-Chek FastClix Lancets Does not apply Misc To monitor blood sugar three times daily 300 each 3    semaglutide (OZEMPIC, 0.25 OR 0.5 MG/DOSE,) 2 MG/3ML Subcutaneous Solution Pen-injector Inject 0.5 mg into the skin once a week. 12 each 3    gabapentin 300 MG Oral Cap Take 1 capsule (300 mg total) by mouth 3 (three) times daily as needed. 270 capsule 0    amLODIPine 10 MG Oral Tab Take 1 tablet (10 mg total) by mouth daily. 90 tablet 3    atorvastatin 20 MG Oral Tab Take 1  tablet (20 mg total) by mouth daily. 90 tablet 3    omeprazole 20 MG Oral Capsule Delayed Release TAKE 1 CAPSULE BY MOUTH EVERY DAY IN THE MORNING 90 capsule 3    triamcinolone 0.1 % External Ointment Apply 1 Application topically 2 (two) times daily. APPLY TO AFFECTED AREA 80 g 1    LATANOPROST 0.005 % Ophthalmic Solution USE 1 DROP IN BOTH EYES NIGHTLY 3 each 3    clobetasol 0.05 % External Ointment Apply 1 Application  topically daily as needed. Apply to both arms and foot 2x/day as needed 120 g 1    Glucose Blood (ACCU-CHEK GUIDE) In Vitro Strip To monitor blood sugar three times daily 300 strip 3    aspirin 81 MG Oral Tab EC Take 1 tablet (81 mg total) by mouth daily.

## 2025-06-09 RX ORDER — BLOOD SUGAR DIAGNOSTIC
1 STRIP MISCELLANEOUS 3 TIMES DAILY
Qty: 300 STRIP | Refills: 1 | Status: SHIPPED | OUTPATIENT
Start: 2025-06-09

## 2025-06-09 NOTE — TELEPHONE ENCOUNTER
Refill passes per [facility] protocol.    Future Appointments   Date Time Provider Department Center   8/4/2025  8:00 AM Kevin Rey MD [facility] [address]

## 2025-06-23 DIAGNOSIS — E11.42 TYPE 2 DIABETES MELLITUS WITH DIABETIC POLYNEUROPATHY, WITHOUT LONG-TERM CURRENT USE OF INSULIN (HCC): ICD-10-CM

## 2025-06-23 DIAGNOSIS — I10 ESSENTIAL HYPERTENSION: ICD-10-CM

## 2025-06-23 DIAGNOSIS — K21.9 GASTROESOPHAGEAL REFLUX DISEASE: ICD-10-CM

## 2025-06-23 DIAGNOSIS — I25.10 ATHEROSCLEROSIS OF NATIVE CORONARY ARTERY OF NATIVE HEART WITHOUT ANGINA PECTORIS: ICD-10-CM

## 2025-06-24 DIAGNOSIS — I10 ESSENTIAL HYPERTENSION: ICD-10-CM

## 2025-06-25 RX ORDER — ATORVASTATIN CALCIUM 20 MG/1
20 TABLET, FILM COATED ORAL DAILY
Qty: 90 TABLET | Refills: 3 | Status: SHIPPED | OUTPATIENT
Start: 2025-06-25

## 2025-06-25 RX ORDER — DAPAGLIFLOZIN 10 MG/1
10 TABLET, FILM COATED ORAL DAILY
Qty: 90 TABLET | Refills: 3 | Status: SHIPPED | OUTPATIENT
Start: 2025-06-25

## 2025-06-25 RX ORDER — LOSARTAN POTASSIUM AND HYDROCHLOROTHIAZIDE 12.5; 1 MG/1; MG/1
1 TABLET ORAL DAILY
Qty: 90 TABLET | Refills: 3 | Status: SHIPPED | OUTPATIENT
Start: 2025-06-25

## 2025-06-25 RX ORDER — OMEPRAZOLE 20 MG/1
20 CAPSULE, DELAYED RELEASE ORAL EVERY MORNING
Qty: 90 CAPSULE | Refills: 3 | Status: SHIPPED | OUTPATIENT
Start: 2025-06-25

## 2025-06-26 RX ORDER — METOPROLOL SUCCINATE 100 MG/1
100 TABLET, EXTENDED RELEASE ORAL EVERY EVENING
Qty: 90 TABLET | Refills: 3 | Status: SHIPPED | OUTPATIENT
Start: 2025-06-26

## 2025-08-04 ENCOUNTER — OFFICE VISIT (OUTPATIENT)
Dept: INTERNAL MEDICINE CLINIC | Facility: CLINIC | Age: 70
End: 2025-08-04

## 2025-08-04 VITALS
OXYGEN SATURATION: 98 % | TEMPERATURE: 98 F | DIASTOLIC BLOOD PRESSURE: 80 MMHG | SYSTOLIC BLOOD PRESSURE: 124 MMHG | HEART RATE: 74 BPM | BODY MASS INDEX: 36.16 KG/M2 | HEIGHT: 66 IN | WEIGHT: 225 LBS

## 2025-08-04 DIAGNOSIS — I25.10 ATHEROSCLEROSIS OF NATIVE CORONARY ARTERY OF NATIVE HEART WITHOUT ANGINA PECTORIS: ICD-10-CM

## 2025-08-04 DIAGNOSIS — K21.9 GASTROESOPHAGEAL REFLUX DISEASE, UNSPECIFIED WHETHER ESOPHAGITIS PRESENT: ICD-10-CM

## 2025-08-04 DIAGNOSIS — M72.2 PLANTAR FASCIITIS: ICD-10-CM

## 2025-08-04 DIAGNOSIS — L43.9 LICHEN PLANUS: ICD-10-CM

## 2025-08-04 DIAGNOSIS — E66.812 CLASS 2 SEVERE OBESITY DUE TO EXCESS CALORIES WITH SERIOUS COMORBIDITY AND BODY MASS INDEX (BMI) OF 36.0 TO 36.9 IN ADULT (HCC): ICD-10-CM

## 2025-08-04 DIAGNOSIS — E78.00 HYPERCHOLESTEROLEMIA: ICD-10-CM

## 2025-08-04 DIAGNOSIS — M51.369 BULGING LUMBAR DISC: ICD-10-CM

## 2025-08-04 DIAGNOSIS — Z00.00 HEALTH MAINTENANCE EXAMINATION: Primary | ICD-10-CM

## 2025-08-04 DIAGNOSIS — H52.13 HIGH MYOPIA, BILATERAL: ICD-10-CM

## 2025-08-04 DIAGNOSIS — I10 ESSENTIAL HYPERTENSION: ICD-10-CM

## 2025-08-04 DIAGNOSIS — M47.816 LUMBAR SPONDYLOSIS: ICD-10-CM

## 2025-08-04 DIAGNOSIS — R06.83 SNORING: ICD-10-CM

## 2025-08-04 DIAGNOSIS — R93.1 AGATSTON CORONARY ARTERY CALCIUM SCORE BETWEEN 200 AND 399: ICD-10-CM

## 2025-08-04 DIAGNOSIS — Z87.891 HISTORY OF NICOTINE DEPENDENCE: ICD-10-CM

## 2025-08-04 DIAGNOSIS — E66.01 CLASS 2 SEVERE OBESITY DUE TO EXCESS CALORIES WITH SERIOUS COMORBIDITY AND BODY MASS INDEX (BMI) OF 36.0 TO 36.9 IN ADULT (HCC): ICD-10-CM

## 2025-08-04 DIAGNOSIS — E11.9 TYPE 2 DIABETES MELLITUS WITHOUT COMPLICATION, WITHOUT LONG-TERM CURRENT USE OF INSULIN (HCC): ICD-10-CM

## 2025-08-04 DIAGNOSIS — H25.13 AGE-RELATED NUCLEAR CATARACT OF BOTH EYES: ICD-10-CM

## 2025-08-04 DIAGNOSIS — H40.1132 PRIMARY OPEN ANGLE GLAUCOMA (POAG) OF BOTH EYES, MODERATE STAGE: ICD-10-CM

## 2025-08-16 ENCOUNTER — LAB ENCOUNTER (OUTPATIENT)
Dept: LAB | Age: 70
End: 2025-08-16
Attending: FAMILY MEDICINE

## 2025-08-16 DIAGNOSIS — Z00.00 HEALTH MAINTENANCE EXAMINATION: ICD-10-CM

## 2025-08-16 LAB
ALBUMIN SERPL-MCNC: 5.1 G/DL (ref 3.2–4.8)
ALBUMIN/GLOB SERPL: 1.8 (ref 1–2)
ALP LIVER SERPL-CCNC: 60 U/L (ref 45–117)
ALT SERPL-CCNC: 20 U/L (ref 10–49)
ANION GAP SERPL CALC-SCNC: 8 MMOL/L (ref 0–18)
AST SERPL-CCNC: 24 U/L (ref ?–34)
BASOPHILS # BLD AUTO: 0.03 X10(3) UL (ref 0–0.2)
BASOPHILS NFR BLD AUTO: 0.4 %
BILIRUB SERPL-MCNC: 0.4 MG/DL (ref 0.2–1.1)
BUN BLD-MCNC: 14 MG/DL (ref 9–23)
BUN/CREAT SERPL: 10.4 (ref 10–20)
CALCIUM BLD-MCNC: 9.8 MG/DL (ref 8.7–10.4)
CHLORIDE SERPL-SCNC: 106 MMOL/L (ref 98–112)
CHOLEST SERPL-MCNC: 129 MG/DL (ref ?–200)
CO2 SERPL-SCNC: 25 MMOL/L (ref 21–32)
COMPLEXED PSA SERPL-MCNC: 2.57 NG/ML (ref ?–4)
CREAT BLD-MCNC: 1.35 MG/DL (ref 0.7–1.3)
DEPRECATED RDW RBC AUTO: 49.8 FL (ref 35.1–46.3)
EGFRCR SERPLBLD CKD-EPI 2021: 56 ML/MIN/1.73M2 (ref 60–?)
EOSINOPHIL # BLD AUTO: 0.1 X10(3) UL (ref 0–0.7)
EOSINOPHIL NFR BLD AUTO: 1.4 %
ERYTHROCYTE [DISTWIDTH] IN BLOOD BY AUTOMATED COUNT: 14.5 % (ref 11–15)
FASTING PATIENT LIPID ANSWER: YES
FASTING STATUS PATIENT QL REPORTED: YES
GLOBULIN PLAS-MCNC: 2.9 G/DL (ref 2–3.5)
GLUCOSE BLD-MCNC: 122 MG/DL (ref 70–99)
HCT VFR BLD AUTO: 42.3 % (ref 39–53)
HCV AB SERPL QL IA: NONREACTIVE
HDLC SERPL-MCNC: 41 MG/DL (ref 40–59)
HGB BLD-MCNC: 14.1 G/DL (ref 13–17.5)
IMM GRANULOCYTES # BLD AUTO: 0.01 X10(3) UL (ref 0–1)
IMM GRANULOCYTES NFR BLD: 0.1 %
LDLC SERPL CALC-MCNC: 68 MG/DL (ref ?–100)
LYMPHOCYTES # BLD AUTO: 1.99 X10(3) UL (ref 1–4)
LYMPHOCYTES NFR BLD AUTO: 27.5 %
MCH RBC QN AUTO: 31.3 PG (ref 26–34)
MCHC RBC AUTO-ENTMCNC: 33.3 G/DL (ref 31–37)
MCV RBC AUTO: 94 FL (ref 80–100)
MONOCYTES # BLD AUTO: 0.56 X10(3) UL (ref 0.1–1)
MONOCYTES NFR BLD AUTO: 7.7 %
NEUTROPHILS # BLD AUTO: 4.55 X10 (3) UL (ref 1.5–7.7)
NEUTROPHILS # BLD AUTO: 4.55 X10(3) UL (ref 1.5–7.7)
NEUTROPHILS NFR BLD AUTO: 62.9 %
NONHDLC SERPL-MCNC: 88 MG/DL (ref ?–130)
OSMOLALITY SERPL CALC.SUM OF ELEC: 290 MOSM/KG (ref 275–295)
PLATELET # BLD AUTO: 306 10(3)UL (ref 150–450)
POTASSIUM SERPL-SCNC: 4 MMOL/L (ref 3.5–5.1)
PROT SERPL-MCNC: 8 G/DL (ref 5.7–8.2)
RBC # BLD AUTO: 4.5 X10(6)UL (ref 3.8–5.8)
SODIUM SERPL-SCNC: 139 MMOL/L (ref 136–145)
TRIGL SERPL-MCNC: 108 MG/DL (ref 30–149)
VLDLC SERPL CALC-MCNC: 16 MG/DL (ref 0–30)
WBC # BLD AUTO: 7.2 X10(3) UL (ref 4–11)

## 2025-08-16 PROCEDURE — 80053 COMPREHEN METABOLIC PANEL: CPT | Performed by: FAMILY MEDICINE

## 2025-08-16 PROCEDURE — 84153 ASSAY OF PSA TOTAL: CPT | Performed by: FAMILY MEDICINE

## 2025-08-16 PROCEDURE — 86803 HEPATITIS C AB TEST: CPT | Performed by: FAMILY MEDICINE

## 2025-08-16 PROCEDURE — 85025 COMPLETE CBC W/AUTO DIFF WBC: CPT | Performed by: FAMILY MEDICINE

## 2025-08-16 PROCEDURE — 80061 LIPID PANEL: CPT | Performed by: FAMILY MEDICINE

## 2025-08-20 ENCOUNTER — HOSPITAL ENCOUNTER (OUTPATIENT)
Dept: CT IMAGING | Facility: HOSPITAL | Age: 70
Discharge: HOME | End: 2025-08-20
Attending: FAMILY MEDICINE

## 2025-08-20 DIAGNOSIS — Z00.00 HEALTH MAINTENANCE EXAMINATION: ICD-10-CM

## 2025-08-20 DIAGNOSIS — Z87.891 HISTORY OF NICOTINE DEPENDENCE: ICD-10-CM

## 2025-08-20 PROCEDURE — 71271 CT THORAX LUNG CANCER SCR C-: CPT | Performed by: FAMILY MEDICINE

## (undated) DIAGNOSIS — E11.42 TYPE 2 DIABETES MELLITUS WITH DIABETIC POLYNEUROPATHY, WITHOUT LONG-TERM CURRENT USE OF INSULIN (HCC): ICD-10-CM

## (undated) DIAGNOSIS — E11.42 CONTROLLED TYPE 2 DIABETES MELLITUS WITH DIABETIC POLYNEUROPATHY, WITHOUT LONG-TERM CURRENT USE OF INSULIN (HCC): ICD-10-CM

## (undated) DEVICE — HOOD: Brand: FLYTE

## (undated) DEVICE — Device

## (undated) DEVICE — Device: Brand: DEFENDO AIR/WATER/SUCTION AND BIOPSY VALVE

## (undated) DEVICE — GAMMEX® NON-LATEX PI ORTHO SIZE 8.5, STERILE POLYISOPRENE POWDER-FREE SURGICAL GLOVE: Brand: GAMMEX

## (undated) DEVICE — Device: Brand: STABLECUT®

## (undated) DEVICE — SUCTION CANISTER, 3000CC,SAFELINER: Brand: DEROYAL

## (undated) DEVICE — SUTURE PROLENE 7-0 BV-1

## (undated) DEVICE — FORCEP RADIAL JAW 4

## (undated) DEVICE — GAUZE SPONGES,12 PLY: Brand: CURITY

## (undated) DEVICE — 35 ML SYRINGE REGULAR TIP: Brand: MONOJECT

## (undated) DEVICE — GAMMEX® PI HYBRID SIZE 9, STERILE POWDER-FREE SURGICAL GLOVE, POLYISOPRENE AND NEOPRENE BLEND: Brand: GAMMEX

## (undated) DEVICE — PHOTONSABER Y METAL TIP

## (undated) DEVICE — CONMED SCOPE SAVER BITE BLOCK, 20X27 MM: Brand: SCOPE SAVER

## (undated) DEVICE — PREVENA PEEL & PLACE SYSTEM KIT- 13 CM: Brand: PREVENA™ PEEL & PLACE™

## (undated) DEVICE — MEDI-VAC NON-CONDUCTIVE SUCTION TUBING 6MM X 1.8M (6FT.) L: Brand: CARDINAL HEALTH

## (undated) DEVICE — GAMMEX® PI HYBRID SIZE 8.5, STERILE POWDER-FREE SURGICAL GLOVE, POLYISOPRENE AND NEOPRENE BLEND: Brand: GAMMEX

## (undated) DEVICE — VIOLET BRAIDED (POLYGLACTIN 910), SYNTHETIC ABSORBABLE SUTURE: Brand: COATED VICRYL

## (undated) DEVICE — SUTURE VICRYL 2-0 FS-1

## (undated) DEVICE — 2DE14 2-0 PDO 24 X 24: Brand: 2DE14 2-0 PDO 24 X 24

## (undated) DEVICE — CHLORAPREP 26ML APPLICATOR

## (undated) DEVICE — 2T11 #2 PDO 36 X 36: Brand: 2T11 #2 PDO 36 X 36

## (undated) DEVICE — LINE MNTR ADLT SET O2 INTMD

## (undated) DEVICE — ANTERIOR HIP: Brand: MEDLINE INDUSTRIES, INC.

## (undated) DEVICE — 3M™ STERI-STRIP™ REINFORCED ADHESIVE SKIN CLOSURES, R1547, 1/2 IN X 4 IN (12 MM X 100 MM), 6 STRIPS/ENVELOPE: Brand: 3M™ STERI-STRIP™

## (undated) DEVICE — GAMMEX® NON-LATEX PI ORTHO SIZE 9, STERILE POLYISOPRENE POWDER-FREE SURGICAL GLOVE: Brand: GAMMEX

## (undated) DEVICE — BIPOLAR SEALER 23-112-1 AQM 6.0: Brand: AQUAMANTYS™

## (undated) DEVICE — SOL  .9 1000ML BTL

## (undated) DEVICE — Device: Brand: CUSTOM PROCEDURE KIT

## (undated) NOTE — Clinical Note
2017          Tevin Barrios  :  1955      To Whom It May Concern:      Work status: May return to work no restriction. May return to work status per above effective . 2017.     If this office may be of further assistance, please do

## (undated) NOTE — LETTER
February 7, 2018    Gennaro Rg MD  [address] [place] [address]     Patient: Omari Nieto   YOB: 1955   Date of Visit: 2/7/2018       Dear Dr. Rony Summers MD:    Thank you for referring Samantha Boggs to me for evaluation.  He hyperlipidemia   • Glaucoma Neg    • Macular degeneration Neg        Social History: Smoking status: Current Every Day Smoker                                                   Packs/day: 1.00      Years: 45.00        Types: Cigarettes  Smokeless tobacco: Visual Fields       Left Right     Full Full          Extraocular Movement       Right Left     Full Full    L ET          Dilation     Both eyes:  1.0% Mydriacyl and 2.5% Roberto Synephrine @ 8:37 AM            Slit Lamp and Fundus Exam     External Exam Diabetes type II: no background of retinopathy, no signs of neovascularization noted. Discussed ocular and systemic benefits of blood sugar control. Diagnosis and treatment discussed in detail with patient. Amblyopia, left  Longstanding; no treatment.

## (undated) NOTE — LETTER
Jeffrey [address]   Date:   4/13/2021     Name:   Stephen Dominguez    YOB: 1955   MRN:   [id number]       WHERE IS YOUR PAIN NOW? Lopez the areas on your body where you feel the described sensations.   Use the appropriate

## (undated) NOTE — Clinical Note
[facility] SURGERY SCHEDULING FORM   [address] [place] [address] [place] [address]   [phone] (scheduling phone) [phone] (scheduling fax)     PATIENT INFORMATION   Patient Name:    Radha Mcneill   :    1955 Completed by:    Isabel Kidd      Date:    5/5/2017    [facility] will follow its Pre-Admission Assessment and Screening Policy for pre-admission testing.   Physicians that require   additional testing must order this directly and have results faxed to [facility] at 35

## (undated) NOTE — LETTER
8/3/2017          To Whom It May Concern:    Eleonora Maguire is currently under my medical care and may not return to work at this time. He may return to work on 8/14/2017. Activity is restricted as follows: no lifting more than 10 pounds.     If you re

## (undated) NOTE — MR AVS SNAPSHOT
[facility]  [address], [address] [place] [address]  [address] [facility] [phone]               Thank you for choosing us for your health care visit with Mely Neff.  Meg Stack MD.  We are glad to serve you and happy to provide you with this summary of you schedule your appointment. If you are confident that your benefit plan will not require a referral or authorization, such as [place] Medicaid, please feel free to schedule your appointment immediately.  However, if you are unsure about the requirements request through the “request refill” option in your [employer] account. · Refills are not addressed on weekends; covering physicians do not authorize routine medications on weekends.   · No narcotics or controlled substances are refilled after noon on Fridays gabapentin 300 MG Caps   1 twice daily:  4 pm and bedtime   What changed:    - how much to take  - how to take this  - when to take this  - additional instructions   Commonly known as:  NEURONTIN           hydrocodone-acetaminophen 7.5-325 MG Tabs   Take

## (undated) NOTE — LETTER
October 7, 2021    Gem Altamirano MD  [address] [address]     Patient: Ladarius Foster   YOB: 1955   Date of Visit: 10/7/2021       Dear Dr. Teodora Marvin MD:    Thank you for referring Meera Reeves to me for evaluation. joint unlisted; and tonsillectomy.     Family History   Problem Relation Age of Onset   • Heart Disease Mother    • Diabetes Mother    • Cancer Sister         ovarian   • Ovarian Cancer Sister    • Heart Disease Brother         x2   • Cancer Brother total) by mouth daily. 90 tablet 1   • Metoprolol Succinate  MG Oral Tablet 24 Hr Take 1 tablet (100 mg total) by mouth every evening.  90 tablet 1   • ACCU-CHEK FASTCLIX LANCETS Does not apply Misc To monitor blood sugar three times daily 300 each 11 Periphery Normal Normal            Refraction     Wearing Rx       Sphere Cylinder Axis Add    Right -10.00 +2.00 170 +2.50    Left -11.75 +2.00 030 +2.50    Type: Progressive bifocal          Manifest Refraction       Sphere Cylinder Ax[place] Dist VA Add N months (around 2/7/2022) for IOP check. 10/7/2021  Scribed by: Laura Bergeron MD        If you have questions, please do not hesitate to call me. I look forward to following Angie Logan along with you.     Sincerely,        Laura Bergeron MD        CC: No

## (undated) NOTE — LETTER
November 2, 2023      No Recipients     Patient: Jason Sharma   YOB: 1955   Date of Visit: 11/2/2023       Dear Dr. Grace Jones Recipients: Thank you for referring Lilly Thacker to me for evaluation. Here is my assessment and plan of care:    Jason Sharma is a 70year old male. HPI:     HPI    Pt in today for a diabetic eye exam and photos. Per pt is taking Latanoprost in both eyes at bedtime as directed. Pt states vision is a little blurry, mainly in the left eye and feels he may need an updated glasses Rx. Pt has been a diabetic for 10 years       Pt's diabetes is currently controlled by pills   Pt checks BS daily    Pt's last blood sugar was 209 yesterday  Last HA1C was 7.2 on 4/17/23  Endocrinologist: none     Last edited by Chapin Zuniga OT on 11/2/2023  8:45 AM.        Patient History:  Past Medical History:   Diagnosis Date    Age-related nuclear cataract of both eyes 06/30/2015    Amblyopia-left eye 06/30/2015    Anemia     Appendicitis     Diabetes type 2, controlled ([address] 06/30/2015    Floater, vitreous 06/30/2015    Glaucoma     High blood pressure     High myopia 06/30/2015    Knee injury 2012    Obesity, unspecified     Other and unspecified hyperlipidemia     Primary open angle glaucoma (POAG) of both eyes, moderate stage 06/12/2019    Start Latanoprost qhs OU    Type II or unspecified type diabetes mellitus without mention of complication, not stated as uncontrolled     Unspecified essential hypertension        Surgical History: Jason Sharma has a past surgical history that includes electrocardiogram, complete (11/28/2012) (scanned to media tab); appendectomy (2012); knee surgery; colonoscopy (2003); colonoscopy (12/18/2013); colonoscopy (N/A, 07/10/2020) (Procedure: COLONOSCOPY;  Surgeon: Sally Hernandez MD;  Location: [address] [place] [address] ENDOSCOPY); arthroscopy of joint unlisted; tonsillectomy; and hip replacement surgery (Left, 07/09/2021).     Family History   Problem Relation Age of Onset    Heart Disease Mother     Diabetes Mother     Cancer Sister         ovarian    Ovarian Cancer Sister     Heart Disease Brother         x2    Cancer Brother         bladder cancer    Other (Other) Father         killed in steel mill accident    Breast Cancer Cousin     Lipids Neg         hyperlipidemia    Glaucoma Neg     Macular degeneration Neg        Social History:   Social History     Socioeconomic History    Marital status:    Tobacco Use    Smoking status: Former     Packs/day: 0.00     Years: 45.00     Additional pack years: 0.00     Total pack years: 0.00     Types: Cigarettes     Start date: 5/3/2019     Quit date: 2019     Years since quittin.5    Smokeless tobacco: Never   Vaping Use    Vaping Use: Never used   Substance and Sexual Activity    Alcohol use: Not Currently    Drug use: No    Sexual activity: Yes   Other Topics Concern    Caffeine Concern No     Comment: coffee 2 cups    Exercise No     Comment: yardwork   Social History Narrative    The patient does not use an assistive device. .      The patient does live in a home with stairs. Medications:  Current Outpatient Medications   Medication Sig Dispense Refill    LATANOPROST 0.005 % Ophthalmic Solution USE 1 DROP IN BOTH EYES NIGHTLY 7.5 mL 3    amLODIPine 10 MG Oral Tab Take 1 tablet (10 mg total) by mouth daily. 90 tablet 0    gabapentin 300 MG Oral Cap Take 1 capsule (300 mg total) by mouth 3 (three) times daily as needed. 270 capsule 1    losartan 100 MG Oral Tab Take 1 tablet (100 mg total) by mouth daily. 90 tablet 1    triamcinolone 0.1 % External Ointment Apply 1 Application topically 2 (two) times daily. APPLY TO AFFECTED AREA 80 g 1    levocetirizine 5 MG Oral Tab Take 1 tablet (5 mg total) by mouth daily.  90 tablet 3    omeprazole 20 MG Oral Capsule Delayed Release TAKE 1 CAPSULE BY MOUTH EVERY DAY IN THE MORNING 90 capsule 3    metoprolol succinate  MG Oral Tablet 24 Hr Take 1 tablet (100 mg total) by mouth every evening. 90 tablet 1    atorvastatin 20 MG Oral Tab Take 1 tablet (20 mg total) by mouth daily. 90 tablet 3    dapagliflozin (FARXIGA) 10 MG Oral Tab Take 1 tablet (10 mg total) by mouth daily. 90 tablet 1    CYCLOBENZAPRINE 10 MG Oral Tab TAKE 1 TABLET BY MOUTH EVERY DAY AT NIGHT 30 tablet 0    metFORMIN  MG Oral Tablet 24 Hr Take 4 tablets (2,000 mg total) by mouth daily with breakfast. 360 tablet 3    Glucose Blood (ACCU-CHEK GUIDE) In Vitro Strip To monitor blood sugar three times daily 300 strip 3    tacrolimus 0.1 % External Ointment Apply 1 Application topically 2 (two) times daily. APPLY TO AFFECTED AREA 60 g 3    clobetasol 0.05 % External Ointment Apply 1 Application topically daily as needed. Apply to both arms and foot 2x/day as needed 120 g 1    mupirocin 2 % External Ointment Apply 1 Application topically 3 (three) times daily. 30 g 0    Accu-Chek FastClix Lancets Does not apply Misc To monitor blood sugar three times daily 300 each 3    aspirin 81 MG Oral Tab EC Take 1 tablet (81 mg total) by mouth daily. Allergies:    Cymbalta [Duloxetin*    RASH    ROS:       PHYSICAL EXAM:     Base Eye Exam       Visual Acuity (Snellen - Linear)         Right Left    Dist cc 20/50 +2 20/50 -2    Dist ph cc 20/30 -2 20/40    Near cc 20/40- 20/50-      Correction: Glasses              Tonometry (Applanation, 8:55 AM)         Right Left    Pressure 15 15              Pachymetry (6/12/2019)         Right Left    Thickness 564/-1 571/-2              Pupils         Pupils    Right PERRL    Left PERRL              Visual Fields         Left Right     Full Full              Extraocular Movement         Right Left     Full, Ortho Full, Ortho              Neuro/Psych       Oriented x3:  Yes              Dilation       Both eyes: 1.0% Mydriacyl and 2.5% Roberto Synephrine @ 8:55 AM              Dilation #2       Both eyes: 1.0% Mydriacyl and 2.5% Roberto Synephrine @ 8:58 AM Slit Lamp and Fundus Exam       External Exam         Right Left    External Normal Normal              Slit Lamp Exam         Right Left    Lids/Lashes Dermatochalasis, Meibomian gland dysfunction Dermatochalasis, Meibomian gland dysfunction    Conjunctiva/Sclera ocular melanosis 360 ocular melanosis 360    Cornea clear- no KP, 2+ arcus clear- no KP, 2+ arcus    Anterior Chamber Deep and quiet Deep and quiet    Iris No transillumination defects No transillumination defects    Lens + pigment on anterior capsule, 1+ Nuclear sclerosis + pigment on anterior capsule, 1+ Nuclear sclerosis, Trace Cortical cataract infeiorly    Vitreous Vitreous floaters Vitreous floaters              Fundus Exam         Right Left    Disc Sloping margin, Peripapillary atrophy Sloping margin, Peripapillary atrophy    C/D Ratio 0.7 0.6    Macula Normal- no BDR Normal- no BDR    Vessels Normal Normal    Periphery Normal Normal                  Refraction       Wearing Rx         Sphere Cylinder Axis Add    Right -10.50 +2.50 170 +2.75    Left -12.75 +1.75 030 +2.75      Type: Progressive bifocal              Manifest Refraction         Sphere Cylinder [place] Dist [place] Add [place]    Right -10.00 +2.75 170 20/30- +3.00 20/20-    Left -12.50 +2.00 030 20/40 +3.00 20/30-              Final Rx         Sphere Cylinder [place] Dist VA Add Near VA    Right -10.00 +2.75 170 20/30- +3.00 20/20-    Left -12.50 +2.00 030 20/40 +3.00 20/30-      Type: Progressive bifocal                     ASSESSMENT/PLAN:     Diagnoses and Plan:     Primary open angle glaucoma (POAG) of both eyes, moderate stage  Intraocular pressure stable, tolerating medications. Photos were taken today to document optic nerves. Will continue same regimen of Latanoprost at bedtime in both eyes. Will have patient back in 4 months for a pressure check.   .       Diabetes mellitus type 2 without retinopathy ([facility] [address]  Diabetes type II: no background of retinopathy, no signs of neovascularization noted. Discussed ocular and systemic benefits of blood sugar control. Diagnosis and treatment discussed in detail with patient. Amblyopia, left  Longstanding; no treatment. Age-related nuclear cataract of both eyes  Discussed mild cataracts in both eyes that are not affecting vision and are not surgical at this time. Floater, vitreous, bilateral  No treatment. High myopia, bilateral  New glasses today; update as needed. Discussed that new prescription is only a slight change. No orders of the defined types were placed in this encounter. Meds This Visit:  Requested Prescriptions      No prescriptions requested or ordered in this encounter        Follow up instructions:  Return in about 4 months (around 3/2/2024) for IOP check. 11/2/2023  Scribed by: Luis Eduardo Toro MD        If you have questions, please do not hesitate to call me. I look forward to following Alma Rosa Zaman along with you.     Sincerely,        Luis Eduardo Toro MD        CC:   No Recipients    Document electronically generated by: Luis Eduardo Toro MD

## (undated) NOTE — LETTER
7/20/2017          To Whom It May Concern:    Belen Junior is currently under my medical care for a work injury and may not return to work at this time. His return to work date has not been determined.   I will re-evaluate him on August 3, 2017    If

## (undated) NOTE — LETTER
17  :  1955      To Whom It May Concern: This patient was seen in our office on 17 . Work status: May return to work full-time with restrictions no lifting more than 10 lbs and rare bending and twisting.

## (undated) NOTE — LETTER
June 18, 2020    Trenton Tom MD  [address] [address]     Patient: Petros Nair   YOB: 1955   Date of Visit: 6/18/2020       Dear Dr. Umair Michaud MD:    Thank you for referring Mary Carmen Chiang to me for evaluation.  H • Heart Disease Brother         x2   • Cancer Brother         bladder cancer   • Other (Other) Father         killed in steel mill accident   • Breast Cancer Cousin    • Lipids Neg         hyperlipidemia   • Glaucoma Neg    • Macular degeneration Neg DAY 90 tablet 1   • amLODIPine Besylate 10 MG Oral Tab Take 1 tablet (10 mg total) by mouth once daily.  90 tablet 3   • omeprazole 20 MG Oral Capsule Delayed Release TAKE 1 CAPSULE EVERY MORNING 90 capsule 3   • Glucose Blood (ACCU-CHEK GUIDE) In Vitro Str Disc Sloping margin, Peripapillary atrophy Sloping margin, Peripapillary atrophy    C/D Ratio 0.7 0.6    Macula Normal- no BDR Normal- no BDR    Vessels Normal Normal    Periphery Normal Normal            Refraction     Wearing Rx       Sphere Cylinder Ax Sig: Instill 1 drop by ophthalmic route every night into both eyes        Follow up instructions:  Return in about 4 months (around 10/18/2020) for IOP check.     6/18/2020  Scribed by: Jasen Johnson MD      If you have questions, please do not hesitat

## (undated) NOTE — Clinical Note
Dear Saranya Schultz,    I had the opportunity to see your patient Benjamin Carrillo recently. I am sending you this update, and I appreciate your confidence in me to care for your patients.  Please feel free call me with any questions at [phone] or contact me

## (undated) NOTE — LETTER
Jeffrey Dub 36   Date:   1/8/2021     Name:   Nydia Forbes    YOB: 1955   MRN:   [id number]       WHERE IS YOUR PAIN NOW? Lopez the areas on your body where you feel the described sensations.   Use the appropriate

## (undated) NOTE — LETTER
[facility] [address] , [place]   Date:   4/4/2023     Name:   Carlton Billy    YOB: 1955   MRN:   [id number]       WHERE IS YOUR PAIN NOW? Josie the areas on your body where you feel the described sensations. Use the appropriate symbol. Tuyet Carrasco the areas of radiation. Include all affected areas. Just to complete the picture, please draw in the face. ACHE:  ^ ^ ^   NUMBNESS:  0000   PINS & NEEDLES:  = = = =                              ^ ^ ^                       0000              = = = =                                    ^ ^ ^                       0000            = = = =      BURNING:  XXXX   STABBING: ////                  XXXX                ////                         XXXX          ////     Please josie the line below indicating your degree of pain right now  with 0 being no pain 10 being the worst pain possible.                                          0             1             2              3             4              5              6              7             8             9             10         Patient Signature:

## (undated) NOTE — MR AVS SNAPSHOT
[address] [address]  [phone]               Thank you for choosing us for your health care visit with Skye Mcdowell MD.  We are glad to serve you and happy to provide you with this summary [facility] (MRI Only)  [address]    It is the patient's responsibility to check with and follow their insurance company's guidelines for prior authorization for this test.  You may be held responsible for payment in full if pr Instructions and Information about Your Health    Increase losartan to 100 mg daily.        Allergies as of Apr 18, 2017     No Known Allergies                Today's Vital Signs     BP Pulse Height Weight BMI    164/84 mmHg 80 5' 6\" (1.676 m) 237 lb 11.2 3 tabs daily for 3 days, then 2 tabs daily for 3 days, then 1.5 tabs daily for 3 days, then 1 tab daily for 3 days.    Commonly known as:  Roman Ochoa                Where to Get Your Medications      These medications were sent to [facility]/PHARMACY #[id number]

## (undated) NOTE — Clinical Note
4/18/2017          To Whom It May Concern:    Oralia Wan is currently under my medical care and may not return to work at this time. If he improves, he may return to work on 5/2/17. If you require additional information please contact our office.

## (undated) NOTE — MR AVS SNAPSHOT
[address] [address]  [phone]               Thank you for choosing us for your health care visit with Latoya Morrison MD.  We are glad to serve you and happy to provide you with this summary · Remove cigarettes from your home, car, or any other place where you stash them. · Throw away all smoking materials, including matches, lighters, and ashtrays. · Review your list of triggers and your plan for coping with them.   · Stay away from people o Follow Up Visit with Mariaa Marquez MD   [facility], [facility], [address] Malone [address] [address] [phone]              Allergies as of Feb 16, 2017     No Known Allergies polyneuropathy, without long-term current use of insulin ([facility] [address] [E11.42]           LIPID PANEL [7600] [Q]    Complete by:  As directed    Assoc Dx:  Controlled type 2 diabetes mellitus with diabetic polyneuropathy, without long-term current use of insulin ( are inactive.      HOW TO GET STARTED: HOW TO STAY MOTIVATED:   Start activities slowly and build up over time Do what you like   Get your heart pumping – brisk walking, biking, swimming Even 10 minute increments are effective and add up over the week   2 ½

## (undated) NOTE — LETTER
[facility] [address] , [place]   Date:   5/9/2023     Name:   Srini Salazar    YOB: 1955   MRN:   [id number]       WHERE IS YOUR PAIN NOW? Josie the areas on your body where you feel the described sensations. Use the appropriate symbol. Sammi Showers the areas of radiation. Include all affected areas. Just to complete the picture, please draw in the face. ACHE:  ^ ^ ^   NUMBNESS:  0000   PINS & NEEDLES:  = = = =                              ^ ^ ^                       0000              = = = =                                    ^ ^ ^                       0000            = = = =      BURNING:  XXXX   STABBING: ////                  XXXX                ////                         XXXX          ////     Please josie the line below indicating your degree of pain right now  with 0 being no pain 10 being the worst pain possible.                                          0             1             2              3             4              5              6              7             8             9             10         Patient Signature:

## (undated) NOTE — LETTER
[address]   Date:   2/24/2021     Name:   Ming Fairbanks    YOB: 1955   MRN:   [id number]       WHERE IS YOUR PAIN NOW? Lopez the areas on your body where you feel the described sensations.   Use the appropriate

## (undated) NOTE — LETTER
June 11, 2019    Vielka Crawford MD  [address] [facility] [address] [address]     Patient: Danilo Clark   YOB: 1955   Date of Visit: 6/11/2019       Dear Dr. Sonal Canales MD:    Thank you for referring Don Randle to me for evaluation.  Here • Diabetes Mother    • Cancer Sister         ovarian   • Ovarian Cancer Sister    • Heart Disease Brother         x2   • Cancer Brother         bladder cancer   • Other (Other) Father         killed in [address] accident   • Breast Cancer Cousin    • Ashley once daily. Disp: 90 tablet Rfl: 3   Losartan Potassium-HCTZ 100-25 MG Oral Tab Take 1 tablet by mouth daily.  Disp: 90 tablet Rfl: 1   Pravastatin Sodium 10 MG Oral Tab TAKE 1 TABLET NIGHTLY Disp: 90 tablet Rfl: 3   Naproxen Sodium (ALEVE) 220 MG Oral Tab Disc Sloping margin, Peripapillary atrophy Sloping margin, Peripapillary atrophy    C/D Ratio 0.7 0.6    Macula Normal- no BDR Normal- no BDR    Vessels Normal Normal    Periphery Normal Normal            Refraction     Wearing Rx       Sphere Cylinder Ax Return for Next avail, VF,OCT and pachy w/ no MD, If glaucoma diagnostics are wnl, 1 yr dil EE.    6/11/2019  Scribed by: Sergio Frias MD      If you have questions, please do not hesitate to call me. I look forward to following Jim Silva along with you.

## (undated) NOTE — LETTER
[facility] [address] [place], [facility]   Date:   2/22/2023     Name:   Josselin Gutierrez    YOB: 1955   MRN:   [id number]       WHERE IS YOUR PAIN NOW? Josie the areas on your body where you feel the described sensations. Use the appropriate symbol. Alla Mandes the areas of radiation. Include all affected areas. Just to complete the picture, please draw in the face. ACHE:  ^ ^ ^   NUMBNESS:  0000   PINS & NEEDLES:  = = = =                              ^ ^ ^                       0000              = = = =                                    ^ ^ ^                       0000            = = = =      BURNING:  XXXX   STABBING: ////                  XXXX                ////                         XXXX          ////     Please josie the line below indicating your degree of pain right now  with 0 being no pain 10 being the worst pain possible.                                          0             1             2              3             4              5              6              7             8             9             10         Patient Signature:

## (undated) NOTE — Clinical Note
Dear Ean Bender,  I had the opportunity to see your patient Cleave [place] recently. I appreciate your confidence in me to care for your patients. Please feel free call me with any questions at [phone] or contact me through [facility]   Sincerely, Nghia Friedman MD Board Certified, Physical Medicine and Rehabilitation Specializing in [facility], Spine Medicine and [facility]

## (undated) NOTE — LETTER
2/25/2020              [address] [address]         To Whom It May Concern,    Mitchel Maya is under my care and has chronic low back pain. He should not stand for more than 15 minutes at a time.   He s

## (undated) NOTE — LETTER
21          Jonel Underwood  :  1955      To Whom It May Concern: This patient was seen in our office on 21 .       Work Duty Status:   Work Status: Full Duty 4 hours max per day  Disposition:    Return to Work Date: 2021

## (undated) NOTE — Clinical Note
2017          Frieda Erath  :  1955      To Whom It May Concern: This patient was seen in our office on 2017 . Pt should remain off work until re evaluated in office in 3 months.       If this office may be of further as

## (undated) NOTE — LETTER
21          Shakir Champion  :  1955      To Whom It May Concern: This patient was seen in our office on 21 .       Work Duty Status:   Work Status: Off Work  Disposition:    Estimated Full Duty Date: 3/29/21     If this office may

## (undated) NOTE — LETTER
December 20, 2017    Melvin Armendariz MD  [address] [facility]     Patient: Rufino Montgomery   YOB: 1955   Date of Visit: 12/20/2017       Dear Dr. Irvin Rodriguez MD:    Thank you for referring Thelma Baeza to me for evaluation. Social History: Smoking status: Current Every Day Smoker                                                   Packs/day: 1.00      Years: 0.00         Types: Cigarettes  Smokeless tobacco: Never Used                      Comment: Patient has tried to quit sev External Exam       Right Left    External Normal Normal          Slit Lamp Exam       Right Left    Lids/Lashes Dermatochalasis, Meibomian gland dysfunction, eyelash stuck in meibomian gland inferiorly Dermatochalasis, Meibomian gland dysfunction    Conj

## (undated) NOTE — ED AVS SNAPSHOT
Marlon Vincenzo   MRN: [id number]    Department:  [facility] Emergency Department   Date of Visit:  12/20/2017           Disclosure     Insurance plans vary and the physician(s) referred by the ER may not be covered by your plan.  Please contact CARE PHYSICIAN AT ONCE OR RETURN IMMEDIATELY TO THE EMERGENCY DEPARTMENT. If you have been prescribed any medication(s), please fill your prescription right away and begin taking the medication(s) as directed.   If you believe that any of the medications

## (undated) NOTE — LETTER
[address]   Date:   3/3/2020     Name:   Leobardo Taylor    YOB: 1955   MRN:   [id number]       WHERE IS YOUR PAIN NOW? Lopez the areas on your body where you feel the described sensations.   Use the appropriate sym

## (undated) NOTE — ED AVS SNAPSHOT
[facility] Emergency Department    [address]     [place] [address]    Phone:  [phone]    Fax:  [phone]           Norma Sherlyraheem   MRN: [id number]    Department:  [facility] Emergency Department   Date of Visit:  4/15 Quantity:  1 kit   Use daily. Dx 250.00       ACCU-CHEK SOFTCLIX LANCETS Misc   Quantity:  100 each   Use once a day.   Dx 250.00            Where to Get Your Medications      You can get these medications from any pharmacy     Bring a paper prescription f discretion of that Physician.   If you need additional assistance selecting a physician, you may call the [employer] Physician Referral and Class Registration line at [phone] or find a doctor online by visiting [email or web address].    IF THE Additional Information       We are concerned for your overall well being:    - If you are a smoker or have smoked in the last 12 months, we encourage you to explore options for quitting.     - If you have concerns related to behavioral health issues or th

## (undated) NOTE — LETTER
October 20, 2022    Conception MD Roque  [address] [place] [address]     Patient: Dali Blanton   YOB: 1955   Date of Visit: 10/20/2022       Dear Dr. Laurel Epley, MD:    Thank you for referring Rj Brock to me for evaluation. Here is my assessment and plan of care:    No notes on file    If you have questions, please do not hesitate to call me. I look forward to following Deepthi People along with you.     Sincerely,        Monica Ratliff MD        CC: No Recipients    Document electronically generated by: Monica Ratliff MD

## (undated) NOTE — LETTER
[facility] SURGERY SCHEDULING FORM   [address] [place] [address] [place] [address]   [phone] (scheduling phone) [phone] (scheduling fax)     PATIENT INFORMATION   Last Name:      Jessica Shaw      First Name:    Ulises Be

## (undated) NOTE — Clinical Note
2017          Dominga Iverson  :  1955      To Whom It May Concern: This patient was seen in our office on 2017 . Work status:  {MARY RETURN TO OSQW:3294}    May return to work status per above effective ***.     If this office may be

## (undated) NOTE — MR AVS SNAPSHOT
[address] [address]  [phone]               Thank you for choosing us for your health care visit with Roberto Garsia MD.  We are glad to serve you and happy to provide you with this summary What changed:  medication strength   Commonly known as:  COZAAR           MetFORMIN HCl  MG Tb24   Take 4 tablets (2,000 mg total) by mouth daily with breakfast.   Commonly known as:  GLUCOPHAGE-XR           omeprazole 20 MG Cpdr   Take 1 capsule (20

## (undated) NOTE — Clinical Note
Dear Javier Mahmood,    I had the opportunity to see your patient Edvin Saha recently. I am sending you this update, and I appreciate your confidence in me to care for your patients.  Please feel free call me with any questions at [phone] or contact me

## (undated) NOTE — ED AVS SNAPSHOT
[facility] Emergency Department    [address]     [place] [address]    Phone:  [phone]    Fax:  [phone]           Mitch Price   MRN: [id number]    Department:  [facility] Emergency Department   Date of Visit:  4/15 and Class Registration line at [phone] or find a doctor online by visiting [email or web address].    IF THERE IS ANY CHANGE OR WORSENING OF YOUR CONDITION, CALL YOUR PRIMARY CARE PHYSICIAN AT ONCE OR RETURN IMMEDIATELY TO [address] [place] [address].     If